# Patient Record
Sex: MALE | Race: WHITE | Employment: OTHER | URBAN - NONMETROPOLITAN AREA
[De-identification: names, ages, dates, MRNs, and addresses within clinical notes are randomized per-mention and may not be internally consistent; named-entity substitution may affect disease eponyms.]

---

## 2021-02-04 ENCOUNTER — OFFICE VISIT (OUTPATIENT)
Dept: FAMILY MEDICINE CLINIC | Age: 51
End: 2021-02-04
Payer: MEDICARE

## 2021-02-04 VITALS
HEART RATE: 101 BPM | TEMPERATURE: 97.8 F | RESPIRATION RATE: 18 BRPM | BODY MASS INDEX: 42.95 KG/M2 | SYSTOLIC BLOOD PRESSURE: 152 MMHG | DIASTOLIC BLOOD PRESSURE: 104 MMHG | HEIGHT: 70 IN | WEIGHT: 300 LBS | OXYGEN SATURATION: 97 %

## 2021-02-04 DIAGNOSIS — I10 ESSENTIAL HYPERTENSION: ICD-10-CM

## 2021-02-04 DIAGNOSIS — M48.02 CERVICAL SPINAL STENOSIS: ICD-10-CM

## 2021-02-04 DIAGNOSIS — F17.210 CIGARETTE NICOTINE DEPENDENCE WITHOUT COMPLICATION: ICD-10-CM

## 2021-02-04 DIAGNOSIS — M48.061 DEGENERATIVE LUMBAR SPINAL STENOSIS: ICD-10-CM

## 2021-02-04 DIAGNOSIS — Z76.89 ENCOUNTER TO ESTABLISH CARE: Primary | ICD-10-CM

## 2021-02-04 DIAGNOSIS — R00.0 TACHYCARDIA: ICD-10-CM

## 2021-02-04 DIAGNOSIS — R06.81 WITNESSED APNEIC SPELLS: ICD-10-CM

## 2021-02-04 DIAGNOSIS — F31.31 BIPOLAR AFFECTIVE DISORDER, CURRENTLY DEPRESSED, MILD (HCC): ICD-10-CM

## 2021-02-04 DIAGNOSIS — M17.11 PRIMARY OSTEOARTHRITIS OF RIGHT KNEE: ICD-10-CM

## 2021-02-04 DIAGNOSIS — M51.26 HERNIATED LUMBAR INTERVERTEBRAL DISC: ICD-10-CM

## 2021-02-04 DIAGNOSIS — G47.9 SLEEP DISTURBANCE: ICD-10-CM

## 2021-02-04 DIAGNOSIS — G47.8 UNREFRESHED BY SLEEP: ICD-10-CM

## 2021-02-04 PROCEDURE — G8427 DOCREV CUR MEDS BY ELIG CLIN: HCPCS | Performed by: NURSE PRACTITIONER

## 2021-02-04 PROCEDURE — G8417 CALC BMI ABV UP PARAM F/U: HCPCS | Performed by: NURSE PRACTITIONER

## 2021-02-04 PROCEDURE — 4004F PT TOBACCO SCREEN RCVD TLK: CPT | Performed by: NURSE PRACTITIONER

## 2021-02-04 PROCEDURE — 99406 BEHAV CHNG SMOKING 3-10 MIN: CPT | Performed by: NURSE PRACTITIONER

## 2021-02-04 PROCEDURE — 99204 OFFICE O/P NEW MOD 45 MIN: CPT | Performed by: NURSE PRACTITIONER

## 2021-02-04 PROCEDURE — 93000 ELECTROCARDIOGRAM COMPLETE: CPT | Performed by: NURSE PRACTITIONER

## 2021-02-04 PROCEDURE — G8484 FLU IMMUNIZE NO ADMIN: HCPCS | Performed by: NURSE PRACTITIONER

## 2021-02-04 RX ORDER — OXYCODONE HYDROCHLORIDE 5 MG/1
10 TABLET ORAL
COMMUNITY
Start: 2021-02-02

## 2021-02-04 RX ORDER — GABAPENTIN 300 MG/1
300 CAPSULE ORAL 3 TIMES DAILY
Qty: 270 CAPSULE | Refills: 0 | Status: SHIPPED | OUTPATIENT
Start: 2021-02-04 | End: 2021-04-12 | Stop reason: ALTCHOICE

## 2021-02-04 RX ORDER — METOPROLOL SUCCINATE 25 MG/1
25 TABLET, EXTENDED RELEASE ORAL DAILY
Qty: 90 TABLET | Refills: 1 | Status: SHIPPED | OUTPATIENT
Start: 2021-02-04 | End: 2021-06-11

## 2021-02-04 RX ORDER — QUETIAPINE FUMARATE 100 MG/1
100 TABLET, FILM COATED ORAL NIGHTLY
COMMUNITY

## 2021-02-04 RX ORDER — PROPRANOLOL HYDROCHLORIDE 10 MG/1
TABLET ORAL
COMMUNITY
Start: 2021-02-01 | End: 2021-03-01 | Stop reason: ALTCHOICE

## 2021-02-04 RX ORDER — VARENICLINE TARTRATE
KIT
Qty: 1 BOX | Refills: 0 | Status: SHIPPED | OUTPATIENT
Start: 2021-02-04 | End: 2021-04-05

## 2021-02-04 RX ORDER — BACLOFEN 10 MG/1
TABLET ORAL
COMMUNITY
Start: 2021-02-02 | End: 2021-04-05

## 2021-02-04 RX ORDER — NALOXONE HYDROCHLORIDE 4 MG/.1ML
SPRAY NASAL
COMMUNITY
Start: 2021-02-02

## 2021-02-04 RX ORDER — LAMOTRIGINE 25 MG/1
TABLET ORAL
COMMUNITY
Start: 2021-02-01

## 2021-02-04 ASSESSMENT — PATIENT HEALTH QUESTIONNAIRE - PHQ9: 1. LITTLE INTEREST OR PLEASURE IN DOING THINGS: 1

## 2021-02-04 NOTE — PATIENT INSTRUCTIONS
Patient Education        varenicline  Pronunciation:  erica Curran doggyloot  Brand:  Chantix  What is the most important information I should know about varenicline? When you stop smoking, you may have nicotine withdrawal symptoms with or without using medication such as varenicline. This includes feeling restless, depressed, angry, frustrated, or irritated. Stop taking varenicline and call your doctor if you have if you feel depressed, agitated, hostile, aggressive, or have thoughts about suicide or hurting yourself. Do not drink large amounts alcohol. Varenicline can increase the effects of alcohol or change the way you react to it. What is varenicline? Varenicline is a smoking cessation medicine. It is used together with behavior modification and counseling support to help you stop smoking. Varenicline may also be used for purposes not listed in this medication guide. What should I discuss with my health care provider before taking varenicline? You should not use varenicline if you used it in the past and had:  · a serious allergic reaction --trouble breathing, swelling in your face (lips, tongue, throat) or neck; or  · a serious skin reaction --blisters in your mouth, peeling skin rash. Tell your doctor if you have ever had:  · depression or mental illness;  · a seizure;  · kidney disease (or if you are on dialysis);  · heart or blood vessel problems; or  · if you drink alcohol. Tell your doctor if you are pregnant. It is not known whether varenicline will harm an unborn baby if you use the medicine during pregnancy. However, smoking while you are pregnant can harm the unborn baby or cause birth defects. If you breast-feed while using this medicine, your baby may spit up or vomit more than normal, and may have a seizure. Varenicline is not approved for use by anyone younger than 25years old. How should I take varenicline? Follow all directions on your prescription label and read all medication guides or instruction sheets. Use the medicine exactly as directed. When you first start taking varenicline, you will take a low dose and then gradually increase it over the first several days of treatment. Take varenicline regularly to get the most benefit. You may choose from 3 ways to use varenicline. Ask your doctor which method is best for you:  · Set a date to quit smoking and start taking varenicline 1 week before that date. Make sure to quit smoking on your planned quit date. Take varenicline for a total of 12 weeks. · Start taking varenicline before you set a planned quit date, and choose a quit date that is between 8 and 35 days after you start treatment. Take varenicline for a total of 12 weeks. · Start taking varenicline and gradually reduce the number of cigarettes you smoke each day over a 12-week period, until you no longer smoke at all. Then take varenicline for another 12 weeks, for a total of 24 weeks. Take varenicline after eating. Take the medicine with a full glass of water. When you stop smoking, you may have nicotine withdrawal symptoms with or without using medication such as varenicline. Withdrawal symptoms include: increased appetite, weight gain, trouble sleeping, slower heart rate, feeling anxious or restless, and having the urge to smoke. Smoking cessation may also cause new or worsening mental health problems, such as depression. Stop taking varenicline and call your doctor if you have if you feel depressed, agitated, hostile, aggressive, or have thoughts about suicide or hurting yourself. Store at room temperature away from moisture and heat. What happens if I miss a dose? Take the medicine as soon as you can, but skip the missed dose if it is almost time for your next dose. Do not take two doses at one time. Get your prescription refilled before you run out of medicine completely. What happens if I overdose? Seek emergency medical attention or call the Poison Help line at 1-167.285.1362. What should I avoid while taking varenicline? Do not drink large amounts alcohol while taking this medicine. Varenicline can increase the effects of alcohol or change the way you react to it. Some people taking varenicline have had unusual or aggressive behavior or forgetfulness while drinking alcohol. Do not use other medicines to quit smoking, unless your doctor tells you to. Using varenicline while wearing a nicotine patch can cause unpleasant side effects. Avoid driving or hazardous activity until you know how this medicine will affect you. Your reactions could be impaired. What are the possible side effects of varenicline? Get emergency medical help if you have signs of an allergic reaction (hives, difficult breathing, swelling in your face or throat) or a severe skin reaction (fever, sore throat, burning eyes, skin pain, red or purple skin rash with blistering and peeling). Stop using varenicline and call your doctor at once if you have:  · a seizure (convulsions);  · thoughts about suicide or hurting yourself;  · strange dreams, sleepwalking, trouble sleeping;  · new or worsening mental health problems --mood or behavior changes, depression, agitation, hostility, aggression;  · heart attack symptoms --chest pain or pressure, pain spreading to your jaw or shoulder, nausea, sweating; o  · stroke symptoms --sudden numbness or weakness (especially on one side of the body), slurred speech, problems with vision or balance. Your family or other caregivers should also be alert to changes in your mood or behavior. Common side effects may include:  · nausea (may persist for several months), vomiting;  · constipation, gas;  · sleep problems (insomnia); or  · unusual dreams. This is not a complete list of side effects and others may occur. Call your doctor for medical advice about side effects. You may report side effects to FDA at 5-829-RRQ-1570. What other drugs will affect varenicline? After you stop smoking, your doctor may need to adjust the doses of certain medicines you take on a regular basis. Tell your doctor about all your current medicines and any medicine you start or stop using. This includes prescription and over-the-counter medicines, vitamins, and herbal products. Not all possible interactions are listed here. Where can I get more information? Your pharmacist can provide more information about varenicline. Remember, keep this and all other medicines out of the reach of children, never share your medicines with others, and use this medication only for the indication prescribed. Every effort has been made to ensure that the information provided by [address] is accurate, up-to-date, and complete, but no guarantee is made to that effect. Drug information contained herein may be time sensitive. [facility] information has been compiled for use by healthcare practitioners and consumers in the [place] and therefore [facility] does not warrant that uses outside of the [place] are appropriate, unless specifically indicated otherwise. [facility]'s drug information does not endorse drugs, diagnose patients or recommend therapy. [facility]'s drug information is an informational resource designed to assist licensed healthcare practitioners in caring for their patients and/or to serve consumers viewing this service as a supplement to, and not a substitute for, the expertise, skill, knowledge and judgment of healthcare practitioners. The absence of a warning for a given drug or drug combination in no way should be construed to indicate that the drug or drug combination is safe, effective or appropriate for any given patient. [facility] does not assume any responsibility for any aspect of healthcare administered with the aid of information [facility] provides. The information contained herein is not intended to cover all possible uses, directions, precautions, warnings, drug interactions, allergic reactions, or adverse effects. If you have questions about the drugs you are taking, check with your doctor, nurse or pharmacist.  Copyright 4375-5699 [address]. Version: 10.01. Revision date: 8/8/2018. Care instructions adapted under license by [facility] ([facility]). If you have questions about a medical condition or this instruction, always ask your healthcare professional. [place] [address] any warranty or liability for your use of this information.

## 2021-02-04 NOTE — PROGRESS NOTES
his kids were born  Interventions that have helped with previous cessation attempts:  Cold turkey  Motivation to quit (scale of 1-10):  8/10    WANDA  Complains of getting only about 2 hours of sleep at a time. He does wake himself up gasping for air. Sleeps better in a chair. Never feels rested in the mornings. His Exwife has seen apneic episodes. Age/Gender Health Maintenance    Lipid - 40  DM Screen - 40  Colon Cancer Screening - 49  Lung Cancer Screening (Age 54 to [de-identified] with 30 pack year hx, current smoker or quit within past 15 years) - 55    Tetanus - needs  Influenza Vaccine - yearly  Pneumonia Vaccine - 66  Zostavax - 51     PSA testing discussion - 55  AAA Screening - 66    Falls screening - n/a    Current Outpatient Medications   Medication Sig Dispense Refill    baclofen (LIORESAL) 10 MG tablet       lamoTRIgine (LAMICTAL) 25 MG tablet       NARCAN 4 MG/0.1ML LIQD nasal spray       oxyCODONE (ROXICODONE) 5 MG immediate release tablet       propranolol (INDERAL) 10 MG tablet       QUEtiapine (SEROQUEL) 100 MG tablet Take 100 mg by mouth nightly      varenicline (CHANTIX STARTING MONTH EVENS) 0.5 MG X 11 & 1 MG X 42 tablet Take by mouth. 1 box 0    metoprolol succinate (TOPROL XL) 25 MG extended release tablet Take 1 tablet by mouth daily 90 tablet 1    gabapentin (NEURONTIN) 300 MG capsule Take 1 capsule by mouth 3 times daily for 90 days. Intended supply: 90 days 270 capsule 0     No current facility-administered medications for this visit. Orders Placed This Encounter   Medications    varenicline (CHANTIX STARTING MONTH EVENS) 0.5 MG X 11 & 1 MG X 42 tablet     Sig: Take by mouth. Dispense:  1 box     Refill:  0    metoprolol succinate (TOPROL XL) 25 MG extended release tablet     Sig: Take 1 tablet by mouth daily     Dispense:  90 tablet     Refill:  1    gabapentin (NEURONTIN) 300 MG capsule     Sig: Take 1 capsule by mouth 3 times daily for 90 days.  Intended supply: 90 days Dispense:  270 capsule     Refill:  0         All medications reviewed and reconciled, including OTC and herbal medications. Updated list given to patient. Patient Active Problem List   Diagnosis    Herniated lumbar intervertebral disc    Primary osteoarthritis of right knee    Cervical spinal stenosis    Degenerative lumbar spinal stenosis    Bipolar affective disorder, currently depressed, mild (HCC)    Cigarette nicotine dependence without complication    Tachycardia    Essential hypertension       No past medical history on file. No past surgical history on file.     Allergies   Allergen Reactions    Tylenol [Acetaminophen]        Social History     Socioeconomic History    Marital status: Unknown     Spouse name: Not on file    Number of children: Not on file    Years of education: Not on file    Highest education level: Not on file   Occupational History    Not on file   Social Needs    Financial resource strain: Not on file    Food insecurity     Worry: Not on file     Inability: Not on file    Transportation needs     Medical: Not on file     Non-medical: Not on file   Tobacco Use    Smoking status: Current Every Day Smoker     Packs/day: 0.25     Types: Cigarettes     Start date: 2006    Smokeless tobacco: Never Used   Substance and Sexual Activity    Alcohol use: Not on file    Drug use: Not on file    Sexual activity: Not on file   Lifestyle    Physical activity     Days per week: Not on file     Minutes per session: Not on file    Stress: Not on file   Relationships    Social connections     Talks on phone: Not on file     Gets together: Not on file     Attends [employer] service: Not on file     Active member of club or organization: Not on file     Attends meetings of clubs or organizations: Not on file     Relationship status: Not on file    Intimate partner violence     Fear of current or ex partner: Not on file     Emotionally abused: Not on file     Physically abused: Not on file     Forced sexual activity: Not on file   Other Topics Concern    Not on file   Social History Narrative    Not on file       No family history on file. I have reviewed the patient's past medical history, past surgical history, allergies, medications, social and family history and I have made updates where appropriate. Review of Systems  Positive responses are highlighted in bold    Constitutional:  Fever, Chills, Night Sweats, Fatigue, Unexpected changes in weight  Eyes:  Eye discharge, Eye pain, Eye redness, Visual disturbances   HENT:  Ear pain, Tinnitus, Nosebleeds, Trouble swallowing, Hearing loss, Sore throat  Cardiovascular:  Chest Pain, Palpitations, Orthopnea, Paroxysmal Nocturnal Dyspnea  Respiratory:  Cough, Wheezing, Shortness of breath, Chest tightness, Apnea  Gastrointestinal:  Nausea, Vomiting, Diarrhea, Constipation, Heartburn, Blood in stool  Genitourinary:  Difficulty or painful urination, Flank pain, Change in frequency, Urgency  Skin:  Color change, Rash, Itching, Wound  Psychiatric:  Hallucinations, Anxiety, Depression, Suicidal ideation  Hematological:  Enlarged glands, Easy bleeding, Easily bruising  Musculoskeletal:  Joint pain, Back pain, Gait problems, Joint swelling, Myalgias  Neurological:  Dizziness, Headaches, Presyncope, Numbness, Seizures, Tremors  Allergy:  Environmental allergies, Food allergies  Endocrine:  Heat Intolerance, Cold Intolerance, Polydipsia, Polyphagia, Polyuria      PHYSICAL EXAM:  Vitals:    02/04/21 1058 02/04/21 1105   BP: (!) 150/100 (!) 152/104   Pulse: 101    Resp: 18    Temp: 97.8 °F (36.6 °C)    SpO2: 97%    Weight: 300 lb (136.1 kg)    Height: 5' 10\" (1.778 m)      Body mass index is 43.05 kg/m².          VS Reviewed  General Appearance: A&O x 3, No acute distress,well developed and well- nourished  Head: normocephalic and atraumatic  Eyes: pupils equal, round, and reactive to light, extraocular eye movements intact, conjunctivae and eye lids without erythema  ENT: external ear and ear canal clear bilaterally, TMs intact and regular, nose without deformity, nasal mucosa and turbinates normal without polyps, oropharynx normal, dentition is normal for age  Neck: supple and non-tender without mass, no thyromegaly or thyroid nodules, no cervical lymphadenopathy  Pulmonary/Chest: clear to auscultation bilaterally- no wheezes, rales or rhonchi, normal air movement, no respiratory distress or retractions  Cardiovascular: S1 and S2 auscultated w/ RRR. No murmurs, rubs, clicks, or gallops, distal pulses intact. Abdomen: soft, non-tender, non-distended, bowl sounds physiologic,  no rebound or guarding, no masses or hernias noted. Liver and spleen without enlargement. Extremities: no cyanosis, clubbing or edema of the lower extremities  Musculoskeletal: No joint swelling or gross deformity   Neuro:  Alert, 5/5 strength globally and symmetrically  Psych: Affect appropriate. Mood normal. Thought process is normal without evidence of depression or psychosis. Good insight and appropriate interaction. Cognition and memory appear to be intact. Skin: warm and dry, no rash or erythema  Lymph:  No cervical, auricular or supraclavicular lymph nodes palpated    ASSESSMENT & PLAN  Barbara Lama was seen today for annual exam.    Diagnoses and all orders for this visit:    Encounter to establish care    Herniated lumbar intervertebral disc  -     gabapentin (NEURONTIN) 300 MG capsule; Take 1 capsule by mouth 3 times daily for 90 days. Intended supply: 90 days    Primary osteoarthritis of right knee    Cervical spinal stenosis  -     gabapentin (NEURONTIN) 300 MG capsule; Take 1 capsule by mouth 3 times daily for 90 days. Intended supply: 90 days    Degenerative lumbar spinal stenosis  -     gabapentin (NEURONTIN) 300 MG capsule; Take 1 capsule by mouth 3 times daily for 90 days.  Intended supply: 90 days    Bipolar affective disorder, currently depressed, mild (HCC)    Cigarette nicotine dependence without complication  -     PA TOBACCO USE CESSATION INTERMEDIATE 3-10 MINUTES  -     varenicline (CHANTIX STARTING MONTH EVENS) 0.5 MG X 11 & 1 MG X 42 tablet; Take by mouth. Tachycardia  -     EKG 12 Lead  -     metoprolol succinate (TOPROL XL) 25 MG extended release tablet; Take 1 tablet by mouth daily  -     TSH With Reflex Ft4; Future    Essential hypertension  -     EKG 12 Lead  -     metoprolol succinate (TOPROL XL) 25 MG extended release tablet; Take 1 tablet by mouth daily  -     Comprehensive Metabolic Panel; Future  -     CBC With Auto Differential; Future  -     TSH With Reflex Ft4; Future  -     Lipid, Fasting; Future    Sleep disturbance  -     Ambulatory referral to Sleep Medicine    Witnessed apneic spells  -     Ambulatory referral to Sleep Medicine    Unrefreshed by sleep  -     Ambulatory referral to Sleep Medicine      - con't pain meds per Pain Management  - add Gabapentin  - reviewed lumbar and knee xrays  - discussed referral to [facility] for his knee, he wants to wait for now, will call back if he decides he wants to see ortho  - reviewed EKG, shows NSR, no acute ST segment changes  - obtain labs and add Metoprolol for HR/BP  - con't Lamictal and Seroquel, f/u [facility]  - add Chantix for smoking cessation, discussed benefits, risks and SE and proper administration  - refer to [facility] Center for probable WANDA    DISPOSITION    Return in about 2 weeks (around 2/18/2021) for HTN. Neri Hobbs released without restrictions. PATIENT COUNSELING    Counseling was provided today regarding the following topics: Healthy eating habits, Regular exercise, substance abuse and healthy sleep habits.     Neri Hobbs received counseling on the following healthy behaviors: medication adherence and tobacco cessation    Patient given educational materials on: See Attached    I have instructed Neri Hobbs to complete a self tracking handout on none and instructed them to bring it with them to his next appointment. Barriers to learning and self management: none    Discussed use, benefit, and side effects of prescribed medications. Barriers to medication compliance addressed. All patient questions answered. Pt voiced understanding.        Electronically signed by KATHLEEN Orantes CNP on 2/4/2021 at 11:49 AM

## 2021-02-12 ENCOUNTER — NURSE ONLY (OUTPATIENT)
Dept: LAB | Age: 51
End: 2021-02-12

## 2021-02-12 DIAGNOSIS — I10 ESSENTIAL HYPERTENSION: ICD-10-CM

## 2021-02-12 DIAGNOSIS — R00.0 TACHYCARDIA: ICD-10-CM

## 2021-02-12 LAB
ALBUMIN SERPL-MCNC: 4 G/DL (ref 3.5–5.1)
ALP BLD-CCNC: 79 U/L (ref 38–126)
ALT SERPL-CCNC: 81 U/L (ref 11–66)
ANION GAP SERPL CALCULATED.3IONS-SCNC: 13 MEQ/L (ref 8–16)
AST SERPL-CCNC: 32 U/L (ref 5–40)
BASOPHILS # BLD: 0.7 %
BASOPHILS ABSOLUTE: 0.1 THOU/MM3 (ref 0–0.1)
BILIRUB SERPL-MCNC: 0.3 MG/DL (ref 0.3–1.2)
BUN BLDV-MCNC: 13 MG/DL (ref 7–22)
CALCIUM SERPL-MCNC: 9.2 MG/DL (ref 8.5–10.5)
CHLORIDE BLD-SCNC: 104 MEQ/L (ref 98–111)
CHOLESTEROL, FASTING: 170 MG/DL (ref 100–199)
CO2: 25 MEQ/L (ref 23–33)
CREAT SERPL-MCNC: 1.2 MG/DL (ref 0.4–1.2)
EOSINOPHIL # BLD: 1.7 %
EOSINOPHILS ABSOLUTE: 0.2 THOU/MM3 (ref 0–0.4)
ERYTHROCYTE [DISTWIDTH] IN BLOOD BY AUTOMATED COUNT: 12.8 % (ref 11.5–14.5)
ERYTHROCYTE [DISTWIDTH] IN BLOOD BY AUTOMATED COUNT: 43.8 FL (ref 35–45)
GFR SERPL CREATININE-BSD FRML MDRD: 64 ML/MIN/1.73M2
GLUCOSE BLD-MCNC: 128 MG/DL (ref 70–108)
HCT VFR BLD CALC: 48.4 % (ref 42–52)
HDLC SERPL-MCNC: 45 MG/DL
HEMOGLOBIN: 16.1 GM/DL (ref 14–18)
IMMATURE GRANS (ABS): 0.07 THOU/MM3 (ref 0–0.07)
IMMATURE GRANULOCYTES: 0.5 %
LDL CHOLESTEROL CALCULATED: 106 MG/DL
LYMPHOCYTES # BLD: 26.5 %
LYMPHOCYTES ABSOLUTE: 3.6 THOU/MM3 (ref 1–4.8)
MCH RBC QN AUTO: 30.8 PG (ref 26–33)
MCHC RBC AUTO-ENTMCNC: 33.3 GM/DL (ref 32.2–35.5)
MCV RBC AUTO: 92.7 FL (ref 80–94)
MONOCYTES # BLD: 6.4 %
MONOCYTES ABSOLUTE: 0.9 THOU/MM3 (ref 0.4–1.3)
NUCLEATED RED BLOOD CELLS: 0 /100 WBC
PLATELET # BLD: 223 THOU/MM3 (ref 130–400)
PMV BLD AUTO: 11.2 FL (ref 9.4–12.4)
POTASSIUM SERPL-SCNC: 4.3 MEQ/L (ref 3.5–5.2)
RBC # BLD: 5.22 MILL/MM3 (ref 4.7–6.1)
SEG NEUTROPHILS: 64.2 %
SEGMENTED NEUTROPHILS ABSOLUTE COUNT: 8.7 THOU/MM3 (ref 1.8–7.7)
SODIUM BLD-SCNC: 142 MEQ/L (ref 135–145)
TOTAL PROTEIN: 6.5 G/DL (ref 6.1–8)
TRIGLYCERIDE, FASTING: 93 MG/DL (ref 0–199)
TSH SERPL DL<=0.05 MIU/L-ACNC: 3.12 UIU/ML (ref 0.4–4.2)
WBC # BLD: 13.6 THOU/MM3 (ref 4.8–10.8)

## 2021-02-14 NOTE — PROGRESS NOTES
[facility] [address] [facility] initial consultation note    Chilo Ahmadi                                             Chief Complaint: Katheryn Lira is here for sleep consult ref by Dereje Cui     Chief complaint: Chilo Ahmadi is a 53 y.o.oldmale came for further evaluation regarding his hypersomnia and ?sleep apnea  with referral from Mr. Maycol Packer Mike,CNP      [place]:    Sleep/Wake schedule:  Usual time to go to bed during the work/regular day of week: 9:00 PM.  Usual time to wake up during the work//regular day of week: 6:00 AM.   Over the weekends his sleep schedule: [x] Remain same. He usually falls a sleep in less than: Approximately <10 minutes minutes. He takes naps: Yes. Number of naps per day: 2 to 3 times in a given day. He takes short naps:  During each nap he spends a total of: 10 to 15 minutes minutes. The naps were reported as refreshing: Yes. Sleep Hygiene:    Is the temperature and evironment in his bed room is acceptable to him: Yes. He watches Television in his bed room: Yes-Sometimes  He read books, study, pay bills etc in the bed: Yes. He works with his smart phone before going to bed   Frequency He wake up during night/sleep: 4 to 5 times  Majority of nocturnal awakenings are for urination: Yes-sometimes. Difficulty in falling back to sleep after nocturnal awakenings: No  .  Do you drink coffee: No.      Do you drink caffeinated beverages i.e sodas: Yes. 2 can/s per day. Pasquale or Dr. [employer] you drink tea:Yes. 2 bottles of ginseng tea per day. Do you drink alcoholic beverages: No.  He used to drink alcohol in the past approximately 20 years back. He is currently not drinking alcohol. History of recreational drug use: No.     History of tobacco smoking:Yes. Amount of tobacco smokin.0 PPD. Years of tobacco smokin.                                    Quit smoking: Yes.   Yesterday           Current smoker: No.  He stopped smoking yesterday  Amount of current tobacco smokin      Sleep apnea symptoms:  Noticed to have loud snoring:Yes. Noted by his family member- spouse and children  Witnessed apneas during sleep noticed: Yes. Noted by his family [id number]. History of choking and gasping sensation at night time: Yes. History of headaches in the morning:Yes. History of dry mouth in the morning: No.  History of palpitations during night time/nocturnal awakenings: Yes. History of sweating during night time/nocturnal awakenings: Yes    General:  History of head injury in the past: No.    History of seizures: No.   Rest less legs syndrome symptoms:NO  History suggestive of periodic limb movements during sleep: NO  History suggestive of hypnagogic hallucinations: NO  History suggestive of hypnopompic hallucinations: NO  History suggestive of sleep talking: Yes  History suggestive of sleep walking:NO  History suggestive of bruxism: No.  He gave a history of bruxism in the past.  Patient currently denies any recent episodes of bruxism. History suggestive of cataplexy: NO  History suggestive of sleep paralysis: NO    Family history of sleep disorders:  Family history of obstructive sleep apnea: Yes. Family member diagnosed with obstructive sleep apnea father. Family member using PAP therapy:  Yes. His father  of pickwickian syndrome. His father developed respiratory failure requiring ventilator support. Family history of Narcolepsy: No.   Family history of Rest less legs syndrome : Yes. family member diagnosed with Restless legs syndrome father. History regarding old sleep studies:  Prior history of sleep study: No.  Using CPAP device: No.  Currently using home Oxygen: NO.       Patient considerations:  Is the patient is ambulatory: Yes  Patient is currently using: None of these Wheelchair, [facility] or [employer].   Para/Quadriplegic: NO  Hearing deficit : NO  Claustrophobic: NO  MDD : NO  Blind: NO  Incontinent: NO  Para/Quadraplegi: NO.   Need transportation to and from Sleep Center:NO      Social History:  Social History     Tobacco Use    Smoking status: Former Smoker     Packs/day: 0.25     Types: Cigarettes     Start date: 2006     Quit date: 2/19/2021    Smokeless tobacco: Never Used   Substance Use Topics    Alcohol use: Not on file    Drug use: Not on file   . He is currently working: No.   [x]Disabled. 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 He used to work around the clock. He also used to be on call in the past.  He is currently on disability for the last 7 years. Past Medical History:   Diagnosis Date    Bipolar disorder with depression ([facility], [place] [address]     Cervical spinal stenosis     Cigarette nicotine dependence without complication 4/3/9890    Degenerative lumbar spinal stenosis 2/4/2021    Essential hypertension     Herniated lumbar intervertebral disc 2/4/2021    Primary osteoarthritis of right knee 2/4/2021    Tachycardia        No past surgical history on file. Allergies   Allergen Reactions    Tylenol [Acetaminophen]     Celebrex [Celecoxib] Rash       Current Outpatient Medications   Medication Sig Dispense Refill    varenicline (CHANTIX CONTINUING MONTH EVENS) 1 MG tablet Take 1 tablet by mouth 2 times daily 60 tablet 3    Handicap Placard MISC by Does not apply route Expires 2/17/2026 1 each 0    Elastic Bandages & Supports (KNEE BRACE/HINGED/REGULAR) MISC 1 Device by Does not apply route daily 1 each 0    baclofen (LIORESAL) 10 MG tablet       lamoTRIgine (LAMICTAL) 25 MG tablet       NARCAN 4 MG/0.1ML LIQD nasal spray       oxyCODONE (ROXICODONE) 5 MG immediate release tablet       propranolol (INDERAL) 10 MG tablet       QUEtiapine (SEROQUEL) 100 MG tablet Take 100 mg by mouth nightly      varenicline (CHANTIX STARTING MONTH EVENS) 0.5 MG X 11 & 1 MG X 42 tablet Take by mouth.  1 box 0    metoprolol succinate (TOPROL XL) 25 MG extended release tablet Take 1 tablet by mouth daily 90 tablet 1    gabapentin (NEURONTIN) 300 MG capsule Take 1 capsule by mouth 3 times daily for 90 days. Intended supply: 90 days 270 capsule 0     No current facility-administered medications for this visit. No family history on file. Review of Systems:   General/Constitutional: He gained approximately 30 pounds of weight in the last 1 year with a normal appetite. He eats only 1 time a day. no fever or chills. HENT: He is tooth were pulled from both just 6 months back  Eyes: Negative. Upper respiratory tract: No nasal stuffiness or post nasal drip. Lower respiratory tract/ lungs: No cough or sputum production. No hemoptysis. Cardiovascular: No palpitations or chest pain. Gastrointestinal: No nausea or vomiting. Neurological: No focal neurologiacal weakness. Extremities: No edema. Musculoskeletal: No complaints. Genitourinary: No complaints. Hematological: Negative. Psychiatric/Behavioral: Negative. Skin: No itching. /82 (Site: Right Upper Arm, Position: Sitting, Cuff Size: Large Adult)   Pulse 82   Temp 97.6 °F (36.4 °C)   Ht 5' 11\" (1.803 m)   Wt (!) 317 lb (143.8 kg)   SpO2 98% Comment: on room air  BMI 44.21 kg/m²   Mallampati airway Class:3  Neck Circumference: 19.5 Inches  [place] sleepiness score 2/19/21: 13  SAQLI:41      Physical Exam   Nursing note and vitals reviewed. Constitutional: Patient appears well built and well nourished. No distress. Patient is oriented to person, place, and time. HENT: Loss of teeth noted in both upper and lower area. Head: Normocephalic and atraumatic. Right Ear: External ear normal.   Left Ear: External ear normal.   Mouth/Throat: Oropharynx is clear and moist.  No oral thrush. Eyes: Conjunctivae are normal. Pupils are equal, round, and reactive to light. No scleral icterus. Neck: Neck supple. No JVD present. No tracheal deviation present.    Cardiovascular: Normal rate, regular rhythm, normal heart sounds. No murmur heard. Pulmonary/Chest: Effort normal and breath sounds normal. No stridor. No respiratory distress. No wheezes. No rales. Patient exhibits no tenderness. Abdominal: Soft. Patient exhibits no distension. No tenderness. Musculoskeletal: Normal range of motion. Extremities: Patient exhibits no edema and no tenderness. Lymphadenopathy:  No cervical adenopathy. Neurological: Patient is alert and oriented to person, place, and time. Skin: Skin is warm and dry. Patient is not diaphoretic. Psychiatric: Patient  has a normal mood and affect. Patient behavior is normal.     Diagnostic Data:  None related sleep. Assessment:  -Snoring with witnessed apneas,frequent nocturnal awakenings and excessive daytime sleepiness to evaluate for obstructive sleep apnea. -Inadequate sleep hygiene.  -Hypersomnia ( Excessive daytime sleepiness) may be due to obstructive sleep apnea Vs Inadequate sleep hygiene Vs his current medications with the sedation as side effect. .  -Bipolar disorder with depression on treatment with medications.  -Cervical spinal stenosis. He never had surgery so far. -Chronic history of tobacco smoking. He quit smoking yesterday.  -Essential hypertension on treatment with medications under control      Recommendations/Plan:  -Will schedule patient for polysomnogram in the sleep lab.   -He was advised to discuss with his pain management specialist MD Jeanette, [place] [id number] regarding changing his current sedative pain medication Roxicodone, Neurontin and Baclofen to a different class of pain medications to improve his daytime sleepiness.  -He was advised to discuss with his psychiatrist Dr. Froy Phillips MD regarding changing his current antipsychotic medications Seroquel and Lamictal to a different class of medication to improve his excessive daytime sleepiness.  -I had a discussion with patient regarding avialable treatment options for his sleep

## 2021-02-15 ENCOUNTER — TELEPHONE (OUTPATIENT)
Dept: FAMILY MEDICINE CLINIC | Age: 51
End: 2021-02-15

## 2021-02-18 ENCOUNTER — OFFICE VISIT (OUTPATIENT)
Dept: FAMILY MEDICINE CLINIC | Age: 51
End: 2021-02-18
Payer: MEDICARE

## 2021-02-18 VITALS
HEART RATE: 92 BPM | SYSTOLIC BLOOD PRESSURE: 108 MMHG | HEIGHT: 71 IN | WEIGHT: 315 LBS | RESPIRATION RATE: 14 BRPM | DIASTOLIC BLOOD PRESSURE: 82 MMHG | TEMPERATURE: 98.1 F | OXYGEN SATURATION: 98 % | BODY MASS INDEX: 44.1 KG/M2

## 2021-02-18 DIAGNOSIS — M23.51 RECURRENT KNEE INSTABILITY, RIGHT: ICD-10-CM

## 2021-02-18 DIAGNOSIS — R73.01 IMPAIRED FASTING GLUCOSE: ICD-10-CM

## 2021-02-18 DIAGNOSIS — M17.11 PRIMARY OSTEOARTHRITIS OF RIGHT KNEE: ICD-10-CM

## 2021-02-18 DIAGNOSIS — D72.829 LEUKOCYTOSIS, UNSPECIFIED TYPE: ICD-10-CM

## 2021-02-18 DIAGNOSIS — M48.061 DEGENERATIVE LUMBAR SPINAL STENOSIS: ICD-10-CM

## 2021-02-18 DIAGNOSIS — R00.0 TACHYCARDIA: Primary | ICD-10-CM

## 2021-02-18 DIAGNOSIS — F17.210 CIGARETTE NICOTINE DEPENDENCE WITHOUT COMPLICATION: ICD-10-CM

## 2021-02-18 DIAGNOSIS — R74.01 ELEVATED TRANSAMINASE LEVEL: ICD-10-CM

## 2021-02-18 DIAGNOSIS — M51.26 HERNIATED LUMBAR INTERVERTEBRAL DISC: ICD-10-CM

## 2021-02-18 DIAGNOSIS — I10 ESSENTIAL HYPERTENSION: ICD-10-CM

## 2021-02-18 PROCEDURE — 99406 BEHAV CHNG SMOKING 3-10 MIN: CPT | Performed by: NURSE PRACTITIONER

## 2021-02-18 PROCEDURE — 4004F PT TOBACCO SCREEN RCVD TLK: CPT | Performed by: NURSE PRACTITIONER

## 2021-02-18 PROCEDURE — G8417 CALC BMI ABV UP PARAM F/U: HCPCS | Performed by: NURSE PRACTITIONER

## 2021-02-18 PROCEDURE — G8484 FLU IMMUNIZE NO ADMIN: HCPCS | Performed by: NURSE PRACTITIONER

## 2021-02-18 PROCEDURE — 99214 OFFICE O/P EST MOD 30 MIN: CPT | Performed by: NURSE PRACTITIONER

## 2021-02-18 PROCEDURE — G8427 DOCREV CUR MEDS BY ELIG CLIN: HCPCS | Performed by: NURSE PRACTITIONER

## 2021-02-18 RX ORDER — VARENICLINE TARTRATE 1 MG/1
1 TABLET, FILM COATED ORAL 2 TIMES DAILY
Qty: 60 TABLET | Refills: 3 | Status: SHIPPED | OUTPATIENT
Start: 2021-02-18 | End: 2022-05-24 | Stop reason: ALTCHOICE

## 2021-02-18 SDOH — ECONOMIC STABILITY: INCOME INSECURITY: HOW HARD IS IT FOR YOU TO PAY FOR THE VERY BASICS LIKE FOOD, HOUSING, MEDICAL CARE, AND HEATING?: SOMEWHAT HARD

## 2021-02-18 SDOH — ECONOMIC STABILITY: TRANSPORTATION INSECURITY
IN THE PAST 12 MONTHS, HAS THE LACK OF TRANSPORTATION KEPT YOU FROM MEDICAL APPOINTMENTS OR FROM GETTING MEDICATIONS?: NO

## 2021-02-18 NOTE — PROGRESS NOTES
[facility] at / [address] [facility] [address]  Chief Complaint   Patient presents with    Follow-up     HTN - better 140s/98    Other     needs  handicap plaquard        History obtained from chart review and the patient. SUBJECTIVE:  Taryn Nayak is a 53 y.o. male that presents today for follow up HTN, back pain    He is following with Dr Antonio Rojas (Pain Management in [place]) for chronic back pain. He has several bulging discs in his lumbar spine as well as lumbar and cervical spinal stenosis. He is getting injections in his back, and he currently taking Narcan, Oxycodone and Baclofen. Since last appt he has resumed Gabapentin. He has only been on it for a few days now. He wanted to check with pain management first before taking it. HTN    Does patient check BP regularly at home? - yes 140/98  Current Medication regimen - Toprol 25 mg  Tolerating medications well? - yes, admits that he's not been urinating as much here recently, not sure if this is related or not    Shortness of breath or chest pain? No  Headache or visual complaints? No  Neurologic changes like confusion? No  Extremity edema? No    BP Readings from Last 3 Encounters:   02/18/21 108/82   02/04/21 (!) 152/104       Elevated Transaminase Level  He reports that pain management had him on Percocet with tylenol for about 1 month. He has had issues before with elevated LFT with taking tylenol. He is no longer taking any tylenol. Denies any ETOH. Nicotine Dependence  He has started Chantix, and he has quit smoking. Had some \"wicked dreams\" the first few days, but this has stopped. His quit date was yesterday, no major urges. He is doing well so far. He reports that his WBC has been chronically elevated for several years. Francia ain today in the snow because his right knee gave out on him. He did have a knee brace he was wearing but he recently lost it and would like to get another. He does have very unable knee. He would also like a handicap placard    Age/Gender Health Maintenance    Lipid - 40  DM Screen - 40  Colon Cancer Screening - 48  Lung Cancer Screening (Age 54 to [de-identified] with 30 pack year hx, current smoker or quit within past 15 years) - 54    Tetanus - needs  Influenza Vaccine - yearly  Pneumonia Vaccine - 65  Zostavax - 50     PSA testing discussion - 55  AAA Screening - 66    Falls screening - n/a    Current Outpatient Medications   Medication Sig Dispense Refill    varenicline (CHANTIX CONTINUING MONTH EVENS) 1 MG tablet Take 1 tablet by mouth 2 times daily 60 tablet 3    Handicap Placard MISC by Does not apply route Expires 2/17/2026 1 each 0    Elastic Bandages & Supports (KNEE BRACE/HINGED/REGULAR) MISC 1 Device by Does not apply route daily 1 each 0    baclofen (LIORESAL) 10 MG tablet       lamoTRIgine (LAMICTAL) 25 MG tablet       NARCAN 4 MG/0.1ML LIQD nasal spray       oxyCODONE (ROXICODONE) 5 MG immediate release tablet       propranolol (INDERAL) 10 MG tablet       QUEtiapine (SEROQUEL) 100 MG tablet Take 100 mg by mouth nightly      varenicline (CHANTIX STARTING MONTH EVENS) 0.5 MG X 11 & 1 MG X 42 tablet Take by mouth. 1 box 0    metoprolol succinate (TOPROL XL) 25 MG extended release tablet Take 1 tablet by mouth daily 90 tablet 1    gabapentin (NEURONTIN) 300 MG capsule Take 1 capsule by mouth 3 times daily for 90 days. Intended supply: 90 days 270 capsule 0     No current facility-administered medications for this visit.       Orders Placed This Encounter   Medications    varenicline (CHANTIX CONTINUING MONTH EVENS) 1 MG tablet     Sig: Take 1 tablet by mouth 2 times daily     Dispense:  60 tablet     Refill:  3    DISCONTD: Handicap Placard MISC     Sig: by Does not apply route Expires 2/17/2026     Dispense:  1 each     Refill:  0    Handicap Placard MISC     Sig: by Does not apply route Expires 2/17/2026     Dispense:  1 each     Refill:  0    Elastic Bandages & Supports (KNEE BRACE/HINGED/REGULAR) MISC     Si Device by Does not apply route daily     Dispense:  1 each     Refill:  0         All medications reviewed and reconciled, including OTC and herbal medications. Updated list given to patient. Patient Active Problem List   Diagnosis    Herniated lumbar intervertebral disc    Primary osteoarthritis of right knee    Cervical spinal stenosis    Degenerative lumbar spinal stenosis    Bipolar affective disorder, currently depressed, mild (HCC)    Cigarette nicotine dependence without complication    Tachycardia    Essential hypertension       Past Medical History:   Diagnosis Date    Bipolar disorder with depression ([address]     Cervical spinal stenosis     Cigarette nicotine dependence without complication 1809    Degenerative lumbar spinal stenosis 2021    Essential hypertension     Herniated lumbar intervertebral disc 2021    Primary osteoarthritis of right knee 2021    Tachycardia        No past surgical history on file.     Allergies   Allergen Reactions    Tylenol [Acetaminophen]     Celebrex [Celecoxib] Rash       Social History     Socioeconomic History    Marital status: Unknown     Spouse name: Not on file    Number of children: Not on file    Years of education: Not on file    Highest education level: Not on file   Occupational History    Not on file   Social Needs    Financial resource strain: Somewhat hard    Food insecurity     Worry: Often true     Inability: Often true    Transportation needs     Medical: No     Non-medical: No   Tobacco Use    Smoking status: Current Every Day Smoker     Packs/day: 0.25     Types: Cigarettes     Start date:     Smokeless tobacco: Never Used   Substance and Sexual Activity    Alcohol use: Not on file    Drug use: Not on file    Sexual activity: Not on file   Lifestyle    Physical activity     Days per week: Not on file     Minutes per session: Not on file    Stress: Not on file Relationships    Social connections     Talks on phone: Not on file     Gets together: Not on file     Attends Rastafarian service: Not on file     Active member of club or organization: Not on file     Attends meetings of clubs or organizations: Not on file     Relationship status: Not on file    Intimate partner violence     Fear of current or ex partner: Not on file     Emotionally abused: Not on file     Physically abused: Not on file     Forced sexual activity: Not on file   Other Topics Concern    Not on file   Social History Narrative    Not on file       No family history on file. I have reviewed the patient's past medical history, past surgical history, allergies, medications, social and family history and I have made updates where appropriate.       Review of Systems  Positive responses are highlighted in bold    Constitutional:  Fever, Chills, Night Sweats, Fatigue, Unexpected changes in weight  Eyes:  Eye discharge, Eye pain, Eye redness, Visual disturbances   HENT:  Ear pain, Tinnitus, Nosebleeds, Trouble swallowing, Hearing loss, Sore throat  Cardiovascular:  Chest Pain, Palpitations, Orthopnea, Paroxysmal Nocturnal Dyspnea  Respiratory:  Cough, Wheezing, Shortness of breath, Chest tightness, Apnea  Gastrointestinal:  Nausea, Vomiting, Diarrhea, Constipation, Heartburn, Blood in stool  Genitourinary:  Difficulty or painful urination, Flank pain, Change in frequency, Urgency  Skin:  Color change, Rash, Itching, Wound  Psychiatric:  Hallucinations, Anxiety, Depression, Suicidal ideation  Hematological:  Enlarged glands, Easy bleeding, Easily bruising  Musculoskeletal:  Joint pain, Back pain, Gait problems, Joint swelling, Myalgias  Neurological:  Dizziness, Headaches, Presyncope, Numbness, Seizures, Tremors  Allergy:  Environmental allergies, Food allergies  Endocrine:  Heat Intolerance, Cold Intolerance, Polydipsia, Polyphagia, Polyuria    Lab Results   Component Value Date     02/12/2021 K 4.3 02/12/2021     02/12/2021    CO2 25 02/12/2021    BUN 13 02/12/2021    CREATININE 1.2 02/12/2021    GLUCOSE 128 (H) 02/12/2021    CALCIUM 9.2 02/12/2021    PROT 6.5 02/12/2021    LABALBU 4.0 02/12/2021    BILITOT 0.3 02/12/2021    ALKPHOS 79 02/12/2021    AST 32 02/12/2021    ALT 81 (H) 02/12/2021    LABGLOM 64 (A) 02/12/2021     Lab Results   Component Value Date    WBC 13.6 (H) 02/12/2021    HGB 16.1 02/12/2021    HCT 48.4 02/12/2021    MCV 92.7 02/12/2021     02/12/2021     Lab Results   Component Value Date    TSH 3.120 02/12/2021       PHYSICAL EXAM:  Vitals:    02/18/21 1041   BP: 108/82   Pulse: 92   Resp: 14   Temp: 98.1 °F (36.7 °C)   TempSrc: Oral   SpO2: 98%   Weight: (!) 315 lb (142.9 kg)   Height: 5' 11\" (1.803 m)     Body mass index is 43.93 kg/m². VS Reviewed  General Appearance: A&O x 3, No acute distress,well developed and well- nourished  Head: normocephalic and atraumatic  Eyes: pupils equal, round, and reactive to light, extraocular eye movements intact, conjunctivae and eye lids without erythema  Neck: supple and non-tender without mass, no thyromegaly or thyroid nodules, no cervical lymphadenopathy  Pulmonary/Chest: clear to auscultation bilaterally- no wheezes, rales or rhonchi, normal air movement, no respiratory distress or retractions  Cardiovascular: S1 and S2 auscultated w/ RRR. No murmurs, rubs, clicks, or gallops, distal pulses intact. Abdomen: soft, non-tender, non-distended, bowl sounds physiologic,  no rebound or guarding, no masses or hernias noted. Liver and spleen without enlargement. Extremities: no cyanosis, clubbing or edema of the lower extremities  Musculoskeletal: No joint swelling or gross deformity   Neuro:  Alert, 5/5 strength globally and symmetrically  Psych: Affect appropriate. Mood normal. Thought process is normal without evidence of depression or psychosis. Good insight and appropriate interaction.   Cognition and memory appear to be intact. Skin: warm and dry, no rash or erythema  Lymph:  No cervical, auricular or supraclavicular lymph nodes palpated    ASSESSMENT & PLAN  Randy Forman was seen today for follow-up and other. Diagnoses and all orders for this visit:    Tachycardia    Essential hypertension    Leukocytosis, unspecified type  -     CBC With Auto Differential; Future    Elevated transaminase level  -     Hepatic Function Panel; Future    Cigarette nicotine dependence without complication  -     varenicline (CHANTIX CONTINUING MONTH PAK) 1 MG tablet;  Take 1 tablet by mouth 2 times daily  -     SC TOBACCO USE CESSATION INTERMEDIATE 3-10 MINUTES    Herniated lumbar intervertebral disc  -     Discontinue: Handicap Placard MISC; by Does not apply route Expires 2/17/2026  -     Handicap Placard MISC; by Does not apply route Expires 2/17/2026    Degenerative lumbar spinal stenosis  -     Discontinue: Handicap Placard MISC; by Does not apply route Expires 2/17/2026  -     Handicap Placard MISC; by Does not apply route Expires 2/17/2026    Impaired fasting glucose  -     Hemoglobin A1C; Future    Primary osteoarthritis of right knee  -     DJO Hinged Knee Brace  -     Discontinue: Handicap Placard MISC; by Does not apply route Expires 2/17/2026  -     Handicap Placard MISC; by Does not apply route Expires 2/17/2026  -     Elastic Bandages & Supports (KNEE BRACE/HINGED/REGULAR) MISC; 1 Device by Does not apply route daily    Recurrent knee instability, right  -     DJO Hinged Knee Brace  -     Discontinue: Handicap Placard MISC; by Does not apply route Expires 2/17/2026  -     Handicap Placard MISC; by Does not apply route Expires 2/17/2026  -     Elastic Bandages & Supports (KNEE BRACE/HINGED/REGULAR) MISC; 1 Device by Does not apply route daily      - BP and HR much improved, con't Metoprolol  - con't Gabapentin, will reassess at next appt  - con't Chantix for smoking cessation  - repeat CBC and LFT in about 4-5 weeks, also check A1C as fasting glucose was elevated  - Rx for handicap placard and knee brace    DISPOSITION    Return in about 6 weeks (around 4/1/2021) for chronic conditions, smoking cessation. Augustina Larson released without restrictions. PATIENT COUNSELING    Counseling was provided today regarding the following topics: Healthy eating habits, Regular exercise, substance abuse and healthy sleep habits. Augustina Larson received counseling on the following healthy behaviors: medication adherence and tobacco cessation    Patient given educational materials on: See Attached    I have instructed Augustina Larson to complete a self tracking handout on none and instructed them to bring it with them to his next appointment. Barriers to learning and self management: none    Discussed use, benefit, and side effects of prescribed medications. Barriers to medication compliance addressed. All patient questions answered. Pt voiced understanding.        Electronically signed by KATHLEEN Pace CNP on 2/18/2021 at 11:15 AM

## 2021-02-19 ENCOUNTER — INITIAL CONSULT (OUTPATIENT)
Dept: PULMONOLOGY | Age: 51
End: 2021-02-19
Payer: MEDICARE

## 2021-02-19 VITALS
DIASTOLIC BLOOD PRESSURE: 82 MMHG | OXYGEN SATURATION: 98 % | SYSTOLIC BLOOD PRESSURE: 118 MMHG | HEIGHT: 71 IN | TEMPERATURE: 97.6 F | WEIGHT: 315 LBS | BODY MASS INDEX: 44.1 KG/M2 | HEART RATE: 82 BPM

## 2021-02-19 DIAGNOSIS — I10 ESSENTIAL HYPERTENSION: ICD-10-CM

## 2021-02-19 DIAGNOSIS — F32.A DEPRESSION, UNSPECIFIED DEPRESSION TYPE: ICD-10-CM

## 2021-02-19 DIAGNOSIS — R06.81 APNEA: Primary | ICD-10-CM

## 2021-02-19 DIAGNOSIS — R06.83 SNORING: ICD-10-CM

## 2021-02-19 DIAGNOSIS — G47.10 HYPERSOMNIA: ICD-10-CM

## 2021-02-19 DIAGNOSIS — G47.30 SLEEP APNEA, UNSPECIFIED TYPE: ICD-10-CM

## 2021-02-19 PROCEDURE — G8484 FLU IMMUNIZE NO ADMIN: HCPCS | Performed by: INTERNAL MEDICINE

## 2021-02-19 PROCEDURE — G8417 CALC BMI ABV UP PARAM F/U: HCPCS | Performed by: INTERNAL MEDICINE

## 2021-02-19 PROCEDURE — 1036F TOBACCO NON-USER: CPT | Performed by: INTERNAL MEDICINE

## 2021-02-19 PROCEDURE — 99204 OFFICE O/P NEW MOD 45 MIN: CPT | Performed by: INTERNAL MEDICINE

## 2021-02-19 PROCEDURE — G8427 DOCREV CUR MEDS BY ELIG CLIN: HCPCS | Performed by: INTERNAL MEDICINE

## 2021-02-19 NOTE — PATIENT INSTRUCTIONS
Recommendations/Plan:  -Will schedule patient for polysomnogram in the sleep lab. -He was advised to discuss with his pain management specialist MD Jeanette, [place] [id number] regarding changing his current sedative pain medication Roxicodone, Neurontin and Baclofen to a different class of pain medications to improve his daytime sleepiness.  -He was advised to discuss with his psychiatrist Dr. India Tinsley MD regarding changing his current antipsychotic medications Seroquel and Lamictal to a different class of medication to improve his excessive daytime sleepiness.  -I had a discussion with patient regarding avialable treatment options for his sleep disorder breathing including but not limited to CPAP titration in the sleep lab Vs.Dental appliance placement with referral to a local dentist Vs other available surgical options including Uvulopalatopharyngoplasty, maxillomandibular ostomy and tracheostomy as last option. At the end of discussion, he is not decided on his treatment if he found to have obstructive sleep apnea at this time.  -We will see Chaparrita Barrios back in 1week after the sleep study to go over the sleep study results and further management options.  -He was educated to practice good sleep hygiene practices. He was provided with a good sleep hygiene hand out. Alex García was advised to make earlier appointment with my clinic if he develops any worsening of sleep symptoms. He verbalizes understanding.  -Augustina Larson was advised to not to drive any motor vehicles or operate heavy equipment until his sleep symptoms are under good control. Chaparrita Barrios verbalizes understanding.  -He was advised to loose weight by controlling diet and doing exercise once cleared by his family physician. - Chaparrita Barrios was educated about my impression and plan. He verbalizes understanding.

## 2021-02-19 NOTE — PROGRESS NOTES
Chief Complaint: Jennifer Mauro is here for sleep consult ref by [facility]    Mallampati airway Class:3  Neck Circumference: 19.5 Inches    [place] sleepiness score 2/19/21: 13  SAQLI:41      Diagnostic Data:

## 2021-02-26 ENCOUNTER — NURSE ONLY (OUTPATIENT)
Dept: LAB | Age: 51
End: 2021-02-26

## 2021-02-26 DIAGNOSIS — R73.01 IMPAIRED FASTING GLUCOSE: ICD-10-CM

## 2021-02-26 DIAGNOSIS — D72.829 LEUKOCYTOSIS, UNSPECIFIED TYPE: ICD-10-CM

## 2021-02-26 DIAGNOSIS — R74.01 ELEVATED TRANSAMINASE LEVEL: ICD-10-CM

## 2021-02-26 LAB
ALBUMIN SERPL-MCNC: 3.6 G/DL (ref 3.5–5.1)
ALP BLD-CCNC: 75 U/L (ref 38–126)
ALT SERPL-CCNC: 99 U/L (ref 11–66)
AST SERPL-CCNC: 63 U/L (ref 5–40)
AVERAGE GLUCOSE: 132 MG/DL (ref 70–126)
BASOPHILS # BLD: 0.6 %
BASOPHILS ABSOLUTE: 0.1 THOU/MM3 (ref 0–0.1)
BILIRUB SERPL-MCNC: 0.4 MG/DL (ref 0.3–1.2)
BILIRUBIN DIRECT: < 0.2 MG/DL (ref 0–0.3)
EOSINOPHIL # BLD: 2.2 %
EOSINOPHILS ABSOLUTE: 0.3 THOU/MM3 (ref 0–0.4)
ERYTHROCYTE [DISTWIDTH] IN BLOOD BY AUTOMATED COUNT: 13 % (ref 11.5–14.5)
ERYTHROCYTE [DISTWIDTH] IN BLOOD BY AUTOMATED COUNT: 44.9 FL (ref 35–45)
HBA1C MFR BLD: 6.4 % (ref 4.4–6.4)
HCT VFR BLD CALC: 50.2 % (ref 42–52)
HEMOGLOBIN: 16.4 GM/DL (ref 14–18)
IMMATURE GRANS (ABS): 0.14 THOU/MM3 (ref 0–0.07)
IMMATURE GRANULOCYTES: 1.1 %
LYMPHOCYTES # BLD: 30.7 %
LYMPHOCYTES ABSOLUTE: 4 THOU/MM3 (ref 1–4.8)
MCH RBC QN AUTO: 30.8 PG (ref 26–33)
MCHC RBC AUTO-ENTMCNC: 32.7 GM/DL (ref 32.2–35.5)
MCV RBC AUTO: 94.2 FL (ref 80–94)
MONOCYTES # BLD: 6.6 %
MONOCYTES ABSOLUTE: 0.9 THOU/MM3 (ref 0.4–1.3)
NUCLEATED RED BLOOD CELLS: 0 /100 WBC
PLATELET # BLD: 184 THOU/MM3 (ref 130–400)
PMV BLD AUTO: 11.4 FL (ref 9.4–12.4)
RBC # BLD: 5.33 MILL/MM3 (ref 4.7–6.1)
SEG NEUTROPHILS: 58.8 %
SEGMENTED NEUTROPHILS ABSOLUTE COUNT: 7.6 THOU/MM3 (ref 1.8–7.7)
TOTAL PROTEIN: 6.1 G/DL (ref 6.1–8)
WBC # BLD: 13 THOU/MM3 (ref 4.8–10.8)

## 2021-03-01 ENCOUNTER — TELEPHONE (OUTPATIENT)
Dept: FAMILY MEDICINE CLINIC | Age: 51
End: 2021-03-01

## 2021-03-01 DIAGNOSIS — R74.01 ELEVATED TRANSAMINASE LEVEL: Primary | ICD-10-CM

## 2021-03-01 DIAGNOSIS — D72.829 LEUKOCYTOSIS, UNSPECIFIED TYPE: ICD-10-CM

## 2021-03-01 NOTE — TELEPHONE ENCOUNTER
Patient informed, verbally understood. Pt transferred to central scheduling to schedule US. Pt will have labs completed the same day.

## 2021-03-01 NOTE — TELEPHONE ENCOUNTER
----- Message from KATHLEEN Burgos - CNP sent at 3/1/2021 10:59 AM EST -----  Let Jocelyn Dick know his A1C came back high at 6.4 which falls within the prediabetic range. 6.5 is classified as diabetic. I'm going to hold off on starting meds for DM just yet, I want him to really work hard on diet/exercise and losing some weight. Also his liver enzymes are still elevated. , they're actually higher than they were before. I am order some further labs and also a liver ultrasound. Also his WBC is still mildly elevated. I am ordering some further labs to check into this.

## 2021-03-08 ENCOUNTER — TELEPHONE (OUTPATIENT)
Dept: FAMILY MEDICINE CLINIC | Age: 51
End: 2021-03-08

## 2021-03-08 ENCOUNTER — NURSE ONLY (OUTPATIENT)
Dept: LAB | Age: 51
End: 2021-03-08

## 2021-03-08 ENCOUNTER — HOSPITAL ENCOUNTER (OUTPATIENT)
Dept: ULTRASOUND IMAGING | Age: 51
Discharge: HOME | End: 2021-03-08
Payer: MEDICARE

## 2021-03-08 DIAGNOSIS — K76.0 HEPATIC STEATOSIS: ICD-10-CM

## 2021-03-08 DIAGNOSIS — D72.829 LEUKOCYTOSIS, UNSPECIFIED TYPE: ICD-10-CM

## 2021-03-08 DIAGNOSIS — R16.0 HEPATOMEGALY: Primary | ICD-10-CM

## 2021-03-08 DIAGNOSIS — R74.01 ELEVATED TRANSAMINASE LEVEL: ICD-10-CM

## 2021-03-08 LAB
C-REACTIVE PROTEIN: 0.44 MG/DL (ref 0–1)
FERRITIN: 226 NG/ML (ref 22–322)
HBV SURFACE AB TITR SER: POSITIVE {TITER}
HEPATITIS B CORE IGM ANTIBODY: NEGATIVE
HEPATITIS B SURFACE ANTIGEN: NEGATIVE
HEPATITIS C ANTIBODY: NEGATIVE
IRON: 81 UG/DL (ref 65–195)
SEDIMENTATION RATE, ERYTHROCYTE: 8 MM/HR (ref 0–10)
TOTAL CK: 121 U/L (ref 55–170)
TOTAL IRON BINDING CAPACITY: 254 UG/DL (ref 171–450)

## 2021-03-08 PROCEDURE — 76705 ECHO EXAM OF ABDOMEN: CPT

## 2021-03-08 NOTE — TELEPHONE ENCOUNTER
----- Message from KATHLEEN Leach CNP sent at 3/8/2021 12:20 PM EST -----  Let Anthony Huddleston know his liver US shows his liver is enlarged and has a lot of fat deposits on his liver. I would actually like for him to see GI. I would rec'd he work hard on losing weight, as this will likely help. Also rec'd low fat diet.

## 2021-03-09 ENCOUNTER — TELEPHONE (OUTPATIENT)
Dept: FAMILY MEDICINE CLINIC | Age: 51
End: 2021-03-09

## 2021-03-09 ENCOUNTER — HOSPITAL ENCOUNTER (OUTPATIENT)
Dept: PHYSICAL THERAPY | Age: 51
Setting detail: THERAPIES SERIES
Discharge: HOME | End: 2021-03-09
Payer: MEDICARE

## 2021-03-09 LAB
ALPHA-1 ANTITRYPSIN: 147 MG/DL (ref 90–200)
CERULOPLASMIN: 20 MG/DL (ref 17–54)
HEPATITIS B CORE TOTAL ANTIBODY: NONREACTIVE
TISSUE TRANSGLUTAMINASE IGA: 0.8 U/ML

## 2021-03-09 PROCEDURE — 97162 PT EVAL MOD COMPLEX 30 MIN: CPT

## 2021-03-09 PROCEDURE — 97012 MECHANICAL TRACTION THERAPY: CPT

## 2021-03-09 NOTE — FLOWSHEET NOTE
** PLEASE SIGN, DATE AND TIME CERTIFICATION BELOW AND RETURN TO [facility] OUTPATIENT REHABILITATION (FAX #: [phone]). ATTEST/CO-SIGN IF ACCESSING VIA [employer] THANK YOU.**    I certify that I have examined the patient below and determined that Physical Medicine and Rehabilitation service is necessary and that I approve the established plan of care for up to 90 days or as specifically noted. Attestation, signature or co-signature of physician indicates approval of certification requirements.    ________________________ ____________ __________  Physician Signature   Date   Time  [facility] [facility]  PHYSICAL THERAPY  [x] EVALUATION  [] DAILY NOTE (LAND) [] DAILY NOTE (AQUATIC ) [] PROGRESS NOTE [] DISCHARGE NOTE    [x] [address] [place] [address]   [] [place] [address]    [] [facility]   [] [facility]    Date: 3/9/2021  Patient Name:  Marcelino Hyatt  : 1970  MRN: [id number]  CSN: [id number]    Referring Practitioner Dasha Quinn MD   Diagnosis LOW BACK PAIN     Treatment Diagnosis Lumbago with left radiculopathy, right knee pain   Date of Evaluation 3/9/21    Additional Pertinent History High BP, Bi-polar, Anxiety disorder, Arthritis      Functional Outcome Measure Used Unruly-Damon   Functional Outcome Score 23/24 (3/9/21)       Insurance: Primary: Payor: [employer] /  /  / ,   Secondary:    Authorization Information: Unlimited visits. Aquatics and modalities except massage covered. Telehealth covered   Visit # 1, 1/10 for progress note   Visits Allowed: Unlimited   Recertification Date: 11   Physician Follow-Up: 3-16-21   Physician Orders: 2x/week for 4 weeks: HEP, modalities, paraspinal and abdominal strengthening   History of Present Illness: Has stenosis in his neck and left arm goes numb - is better since injections.      SUBJECTIVE: Patient reports has been having problems with his right knee for the last 20 years - was a workers comp injury.  has 30% disability in his right knee and it is swollen.  has a bone growing above his kneecap and it will catch and push on things and cause pain.  also has numbness, pain and  tingling down left leg from knee to foot. Rocio has had back pain for years.  has been getting injections and steroids in his low back and he feels good for a couple of days but then pain comes right back.  cannot stand for more than 10-15 minutes due to pain in back. Rocio has to lean over a cart to relieve his pain.  has had therapy for his right knee in the past and it has not helped. Rocio had x-rays done of his back recently but no recent MRI.  can barely do what he needs to do during the day so not sure if will be able to tolerate therapy. Social/Functional History and Current Status:  Medications and Allergies have been reviewed and are listed on Medical History Questionnaire. Adilia Agee lives alone in a multiple floor home with stairs and no handrail to enter. 3-4 step. Has a basement but does not use    Task Previous Current   ADLs  Independent Modified Independent   IADL's Independent Modified Independent   Ambulation Independent Modified Independent   Transfers Independent Modified Independent   Recreation Independent Modified Independent   Community Integration Independent Modified Independent   Driving Active  Active    Work On Disability  On Disability. Used to like fishing and camping but cannot do anymore due to pain     [employer] Disability Scale for Low Back Pain   I stay at home most of the time because of the pain in my back. Yes   I change position frequently to try and make my back comfortable. Yes   I walk more slowly than usual because of the pain in my back. Yes   Because of the pain in my back, I am not doing any of the jobs that I usually do around the house.  Yes   Because of the pain in my back, I use a handrail to get upstairs. Yes   Because of the pain in my back, I lie down to rest more often. Yes   Because of the pain in my back, I have to hold onto something to get out of a reclining chair. Yes   Because of the pain in my back, I ask other people to do things for me. Yes   I get dressed more slowly than usual because of the pain in my back. Yes   I only stand up for short periods of time because of the pain in my back. Yes   Because of the pain in my back, I try not to bend or kneel down. Yes   I find it difficult to get out of a chair because of the pain in my back. Yes   My back hurts most of the time. Yes   I find it difficult to turn over in bed because of the pain in my back. Yes   My appetite is not very good because of the pain in my back. Yes   I have trouble putting on my socks (or stockings) because of the pain in my back. Yes   I only walk short distances because of the pain in my back. Yes   I sleep less because of the pain in my back. Yes   Because of the pain in my back, I get dressed with help from someone else. No   I sit down most of the day because of the pain in my back. Yes   I avoid heavy jobs around the house because of the pain in my back. Yes   Because of the pain in my back, I am more irritable and bad tempered with people. Yes   Because of the pain in my back, I go upstairs more slowly than usual. Yes   I stay in bed most of the time because of the pain in my back. Yes   TOTAL NUMBER OF YES RESPONSES 23/24       OBJECTIVE:    Pain: 8/10 low back , right knee and left leg knee to foot   Palpation Moderate to severe tenderness from mid to low back bilaterally. Observation No scoliosis noted. Hips appear level in standing but difficult to fully assess due to cannot straighten right knee   Posture Fair       Range of Motion Lumbar limited 50-75% all motions and pain with every motion. Cannot fully straighten right knee. [facility]   Strength Severe core weakness.  3+ to 4-/5 strength in bilat legs Coordination    Sensation Numbness and pain down left lower leg   Bed Mobility    Transfers    Ambulation Decreased speed, uneven step length, limp on right, decreased TKE on right   Stairs Can use reciprocal pattern but most of the time uses non-reciprocal pattern due to right knee   Balance Unsteady but no recent falls   Special Tests          TREATMENT   Precautions: Be careful with lifting   Pain: 8/10 low back, Right knee and left leg knee to foot    X in shaded column indicates activity completed today   Modalities Parameters/  Location  Notes   Traction to low back in supine with legs on stool Intermittent pull 150#/75#, 60 sec on/20 sec off, 33\" from bottom of mat to floor, 50% speed with pull X Try static traction next session and see if feels any better/worse               Manual Therapy Time/Technique  Notes                     Exercise/Intervention   Notes   Educated on unloading in prone for 5-10 minutes throughout the day with progressive propping as needed while monitoring symptoms   X                                                                            Specific Interventions Next Treatment: NO MASSAGE per insurance, traction, pool therapy, core and leg strengthening but be careful with right knee, prone unloading and progression if relieving symptoms, posture education, manual therapy    Activity/Treatment Tolerance:  []  Patient tolerated treatment well  []  Patient limited by fatigue  [x]  Patient limited by pain   []  Patient limited by medical complications  []  Other:     Assessment: Patient with long history of orthopedic and health issues. Patient has had therapy for his knee in the past and it did not help and is not sure if anything but surgery will help now but is scared to have surgery now. Patient walking with a limp for the last 20 years has possibly put increased stress on his back.  Patient only getting short term relief with treatments for his back and cannot tolerate any activity for more than 10-15 minutes so unsure how will tolerate therapy. No specific position gets him relief in his left leg but will try prone unloading to see if helps to decrease stress on spine and relieve symptoms into leg. Will also try some traction and some pool therapy to see is helps provide relief to his back and then work on strengthening as can tolerate. Body Structures/Functions/Activity Limitations: impaired activity tolerance, impaired balance, impaired endurance, impaired ROM, impaired strength, pain and abnormal gait  Prognosis: fair    GOALS:  Patient Goal: To be able to get back to some activity with less pain. Short Term Goals:  Time Frame: 4 weeks  1) Patient to report 10-15% decrease in low back pain for ease with sleeping  2) Patient to report able to consistently decrease left leg symptoms to tolerate standing >15 minutes at a time  3) Patient to start strength program without pain increase to provide stability to core with doing activity around the home  4) Patient to report 10-15% relief to right knee pain to allow for less limp with gait to help decrease stress on his back. 5) Patient to demonstrate 25% increase in lumbar range without pain for ease with getting dressed      Long Term Goals:  Time Frame: 12 weeks  1) Patient to be independent with home program to perform all daily activity with minimal to no pain. Patient Education:   [x]  HEP/Education Completed: Plan of Care, Goals, See how feels after Traction. Where to go and what to do for pool therapy   [employer] Access Code:  []  No new Education completed  []  Reviewed Prior HEP      [x]  Patient verbalized and/or demonstrated understanding of education provided. []  Patient unable to verbalize and/or demonstrate understanding of education provided. Will continue education.   []  Barriers to learning: None    PLAN:  Treatment Recommendations: Strengthening, Range of Motion, Functional Mobility Training, Endurance Training, Gait Training, Stair Training, Neuromuscular Re-education, Manual Therapy - Soft Tissue Mobilization, Pain Management, Home Exercise Program, Patient Education, Aquatics and Modalities    [x]  Plan of care initiated. Plan to see patient 2 times per week for 12 weeks to address the treatment planned outlined above.   []  Continue with current plan of care  []  Modify plan of care as follows:    []  Hold pending physician visit  []  Discharge    Time In 0700   Time Out 0800   Timed Code Minutes: 15 min   Total Treatment Time: 60 min       Electronically Signed by: [place] [id number]

## 2021-03-09 NOTE — TELEPHONE ENCOUNTER
----- Message from KATHLEEN Melendez - CNP sent at 3/9/2021  9:48 AM EST -----  Let Amanda Davis know so far all his labs are coming back within normal range. There are several that are still pending, will call when these come back. Hepatitis B antibody is positive indicating that he has immunity to hep B likely from the vaccine. [phone]

## 2021-03-10 LAB
ANA SCREEN: NORMAL
LEUK/LYMPH PHENOTYPING WB: NORMAL

## 2021-03-11 ENCOUNTER — TELEPHONE (OUTPATIENT)
Dept: FAMILY MEDICINE CLINIC | Age: 51
End: 2021-03-11

## 2021-03-11 DIAGNOSIS — D72.829 LEUKOCYTOSIS, UNSPECIFIED TYPE: Primary | ICD-10-CM

## 2021-03-11 LAB — PROTEIN ELECTROPHORESIS, SERUM: NORMAL

## 2021-03-11 NOTE — TELEPHONE ENCOUNTER
----- Message from KATHLEEN Ramirez CNP sent at 3/11/2021  3:16 PM EST -----  Let Arik Gomez know the last of his blood tests all came back within normal range. What i'd like to do since his WBC is still mildly elevated, but it's only been elevated a couple of times here recently, is recheck his CBC in about 2-3 months. If his WBC remains elevated, I will refer then to hematology. Order placed to repeat CBC in about 3 months. Cigarette smoking can cause the WBC to be elevated, and since he has quit, we'll see if it goes back to normal in several months.

## 2021-03-11 NOTE — TELEPHONE ENCOUNTER
Pt notified and will alvin on calender to get done in 3 months. Going to [facility] for labs. Order in epic.

## 2021-03-12 ENCOUNTER — HOSPITAL ENCOUNTER (OUTPATIENT)
Dept: PHYSICAL THERAPY | Age: 51
Setting detail: THERAPIES SERIES
Discharge: HOME | End: 2021-03-12
Payer: MEDICARE

## 2021-03-12 PROCEDURE — 97110 THERAPEUTIC EXERCISES: CPT

## 2021-03-12 PROCEDURE — 97012 MECHANICAL TRACTION THERAPY: CPT

## 2021-03-12 NOTE — PROGRESS NOTES
[facility]  PHYSICAL THERAPY  [] EVALUATION  [x] DAILY NOTE (LAND) [] DAILY NOTE (AQUATIC ) [] PROGRESS NOTE [] DISCHARGE NOTE    [x] OUTPATIENT REHABILITATION CENTER [facility]   [] [place] [address]    [] [facility]   [] [facility]    Date: 3/12/2021  Patient Name:  Gunjan Wolfe  : 1970  MRN: [id number]  CSN: [id number]    Referring Practitioner Sumaya Dudley MD   Diagnosis LOW BACK PAIN     Treatment Diagnosis Lumbago with left radiculopathy, right knee pain   Date of Evaluation 3/9/21    Additional Pertinent History High BP, Bi-polar, Anxiety disorder, Arthritis      Functional Outcome Measure Used Unruly-Damon   Functional Outcome Score 23/24 (3/9/21)       Insurance: Primary: Payor: [employer] /  /  / ,   Secondary:    Authorization Information: Unlimited visits. Aquatics and modalities except massage covered. Telehealth covered   Visit # 2, 2/10 for progress note   Visits Allowed: Unlimited   Recertification Date: 2-1-10   Physician Follow-Up: 3-16-21   Physician Orders: 2x/week for 4 weeks: HEP, modalities, paraspinal and abdominal strengthening   History of Present Illness: Has stenosis in his neck and left arm goes numb - is better since injections. SUBJECTIVE: Patient reports that the numbness in his leg felt better after the first visit and receiving traction. Patient walked into therapy with an antalgic gait pattern. He reports that his pain is a 8/10 in his low back and both of his legs. TREATMENT   Precautions: Be careful with lifting   Pain: 8/10 low back, Right knee and left leg knee to foot    X in shaded column indicates activity completed today   Modalities Parameters/  Location  Notes   Traction to low back in supine with legs on stool Static pull 150#/75#, 33\" from bottom of mat to floor, 50% speed with pull x12 minutes X Return to intermittent pull with traction NEXT SESSION as patient noted improved relief with this way. Manual Therapy Time/Technique  Notes                     Exercise/Intervention   Notes   Educated on unloading in prone for 5-10 minutes throughout the day with progressive propping as needed while monitoring symptoms   X    Prone lying at conclusion of session  x3 minutes   X           Supine:        Abdominal bracing 10x 5 sec  X    PPT    X Increased pain so held                                                Specific Interventions Next Treatment: NO MASSAGE per insurance, traction, pool therapy, core and leg strengthening but be careful with right knee, prone unloading and progression if relieving symptoms, posture education, manual therapy    Activity/Treatment Tolerance:  []  Patient tolerated treatment well  []  Patient limited by fatigue  [x]  Patient limited by pain   []  Patient limited by medical complications  []  Other:     Assessment: Performed static mechanical lumbar traction today. Patient stated that he felt some relief after receiving traction. He states that he felt more relief from the intermittent traction though. He reported that he has gotten some relief with prone lying at home. Introduced gentle core strengthening ad documented above. Patient required cues on proper technique. Patient was able to tolerate abdominal bracing but reported that PPT increased his pain. Finished with 3 minutes of prone lying at conclusion of session. GOALS:  Patient Goal: To be able to get back to some activity with less pain.     Short Term Goals:  Time Frame: 4 weeks  1) Patient to report 10-15% decrease in low back pain for ease with sleeping  2) Patient to report able to consistently decrease left leg symptoms to tolerate standing >15 minutes at a time  3) Patient to start strength program without pain increase to provide stability to core with doing activity around the home  4) Patient to report 10-15% relief to right knee pain to allow for less limp with gait to help decrease stress on his back. 5) Patient to demonstrate 25% increase in lumbar range without pain for ease with getting dressed      Long Term Goals:  Time Frame: 12 weeks  1) Patient to be independent with home program to perform all daily activity with minimal to no pain. Patient Education:   [x]  HEP/Education Completed: Added abdominal bracing. Education on continued prone lying.  [employer] Access Code:  []  No new Education completed  [x]  Reviewed Prior HEP      [x]  Patient verbalized and/or demonstrated understanding of education provided. []  Patient unable to verbalize and/or demonstrate understanding of education provided. Will continue education. []  Barriers to learning: None    PLAN:  Treatment Recommendations: Strengthening, Range of Motion, Functional Mobility Training, Endurance Training, Gait Training, Stair Training, Neuromuscular Re-education, Manual Therapy - Soft Tissue Mobilization, Pain Management, Home Exercise Program, Patient Education, Aquatics and Modalities    []  Plan of care initiated. Plan to see patient 2 times per week for 12 weeks to address the treatment planned outlined above.   [x]  Continue with current plan of care  []  Modify plan of care as follows:    []  Hold pending physician visit  []  Discharge    Time In 0845   Time Out 0916   Timed Code Minutes: 31 min   Total Treatment Time:  31 min       Electronically Signed by: Steven Castellanos

## 2021-03-15 ENCOUNTER — HOSPITAL ENCOUNTER (OUTPATIENT)
Dept: PHYSICAL THERAPY | Age: 51
Setting detail: THERAPIES SERIES
Discharge: HOME | End: 2021-03-15
Payer: MEDICARE

## 2021-03-15 PROCEDURE — 97113 AQUATIC THERAPY/EXERCISES: CPT

## 2021-03-15 NOTE — PROGRESS NOTES
[address] [facility]  PHYSICAL THERAPY  [] EVALUATION  [] DAILY NOTE (LAND) [x] DAILY NOTE (AQUATIC ) [] PROGRESS NOTE [] DISCHARGE NOTE    [x] OUTPATIENT REHABILITATION CENTER [facility]   [] [place] [address]    [] [facility]   [] Petty Wang    Date: 3/15/2021  Patient Name:  Lanetta Leyden  : 1970  MRN: [id number]  CSN: [id number]    Referring Practitioner Holly Norton MD   Diagnosis LOW BACK PAIN     Treatment Diagnosis Lumbago with left radiculopathy, right knee pain   Date of Evaluation 3/9/21    Additional Pertinent History High BP, Bi-polar, Anxiety disorder, Arthritis      Functional Outcome Measure Used Unruly-Damon   Functional Outcome Score 23/24 (3/9/21)       Insurance: Primary: Payor: Drew Masters /  /  / ,   Secondary:    Authorization Information: Unlimited visits. Aquatics and modalities except massage covered. Telehealth covered   Visit # 3, 3/10 for progress note   Visits Allowed: Unlimited   Recertification Date: 66   Physician Follow-Up: 3-16-21   Physician Orders: 2x/week for 4 weeks: HEP, modalities, paraspinal and abdominal strengthening   History of Present Illness: Has stenosis in his neck and left arm goes numb - is better since injections. SUBJECTIVE: Patient reports liked the intermittent traction better than the static. States intensity of symptoms in the left foot are a little less intense, down from 9-10 to 7-8, but the back pain is not really changing. States is also now feeling a little more stiffness in lower thoracic area. Reports sees doctor tomorrow. AQUATICS TREATMENT   Precautions:    Pain:     X in shaded column indicates activity completed today   Exercise/Intervention Sets/Sec  Notes   Walk Forward 2 laps  X In 4'10\"   Walk Backward 1 lap  X In 4'10\" - caused pain so stopped   Walk Sideways 2 laps  X In 4'10\"          Lower Extremity Exercises:     In 4'10\" for all exercises   Heel/Toe Raises 10x each  X Each foot lifted separately for toe raises due to pain in knee   Marches 10x bilat  X    Squats       2 Way Hip - Flex and abd 10x bilat  X    Hamstring Curls 10x bilat  X Limited range in right knee along with pain   Lunges       Step-Ups              Lower Extremity Stretches:              Seated Exercises:              Upper Extremity Exercises: All done in 4'10\"   Shoulder Flexion 10x bilat  X    Shoulder ABD/ADD 10x bilat  X    Shoulder Horizontal ABD/ADD 9x bilat  X    Shoulder IR/ER       Shoulder Circles       Shoulder Shrugs       Rows       Bicep Curls              Upper Extremity Stretches:              Balance:              Dynamic Gait:              Deep Water:    Done in 8'   Hang 5 min  X    Bicycle       Hip ABD/ADD 1 min  X    Hip Flex/Ext 1 min  X      Specific Interventions Next Treatment: NO MASSAGE per insurance, traction, pool therapy, core and leg strengthening but be careful with right knee, prone unloading and progression if relieving symptoms, posture education, manual therapy    Activity/Treatment Tolerance:  []  Patient tolerated treatment well  []  Patient limited by fatigue  [x]  Patient limited by pain   []  Patient limited by medical complications  []  Other:     Assessment: Patient moving very slowly and painfully in and out of the water. Patient reported able to move more easily in the deeper water so that is where PT kept him for his treatment. Able to start pool therapy today and did not increase pain until gravity hit getting out of the pool. Patient with pool posture due to pain from knee and back. Limited range due to knee and back pain. Will continue to see what patient can tolerate. GOALS:  Patient Goal: To be able to get back to some activity with less pain.     Short Term Goals:  Time Frame: 4 weeks  1) Patient to report 10-15% decrease in low back pain for ease with sleeping  2) Patient to report able to consistently decrease left leg symptoms to tolerate standing >15 minutes at a time  3) Patient to start strength program without pain increase to provide stability to core with doing activity around the home  4) Patient to report 10-15% relief to right knee pain to allow for less limp with gait to help decrease stress on his back. 5) Patient to demonstrate 25% increase in lumbar range without pain for ease with getting dressed      Long Term Goals:  Time Frame: 12 weeks  1) Patient to be independent with home program to perform all daily activity with minimal to no pain. Patient Education:   [x]  HEP/Education Completed: Added abdominal bracing. Education on continued prone lying.  [facility] Access Code:  []  No new Education completed  [x]  Reviewed Prior HEP      [x]  Patient verbalized and/or demonstrated understanding of education provided. []  Patient unable to verbalize and/or demonstrate understanding of education provided. Will continue education. []  Barriers to learning: None    PLAN:  []  Plan of care initiated. Plan to see patient 2 times per week for 12 weeks to address the treatment planned outlined above.   [x]  Continue with current plan of care  []  Modify plan of care as follows:    []  Hold pending physician visit  []  Discharge    Time In 1430   Time Out 1515   Timed Code Minutes: 45 min   Total Treatment Time:  45 min       Electronically Signed by: [place] [id number]

## 2021-03-19 ENCOUNTER — HOSPITAL ENCOUNTER (OUTPATIENT)
Dept: PHYSICAL THERAPY | Age: 51
Setting detail: THERAPIES SERIES
Discharge: HOME | End: 2021-03-19
Payer: MEDICARE

## 2021-03-19 PROCEDURE — 97110 THERAPEUTIC EXERCISES: CPT

## 2021-03-19 PROCEDURE — 97012 MECHANICAL TRACTION THERAPY: CPT

## 2021-03-19 NOTE — PROGRESS NOTES
[address] [facility]  PHYSICAL THERAPY  [] EVALUATION  [x] DAILY NOTE (LAND) [] DAILY NOTE (AQUATIC ) [] PROGRESS NOTE [] DISCHARGE NOTE    [x] OUTPATIENT REHABILITATION CENTER [facility]   [] [place] [address]    [] [facility]   [] Shanti Lange    Date: 3/19/2021  Patient Name:  Paul Mclaughlin  : 1970  MRN: [id number]  CSN: [id number]    Referring Practitioner Dashawn Barakat MD   Diagnosis LOW BACK PAIN     Treatment Diagnosis Lumbago with left radiculopathy, right knee pain   Date of Evaluation 3/9/21    Additional Pertinent History High BP, Bi-polar, Anxiety disorder, Arthritis      Functional Outcome Measure Used Unruly-Damon   Functional Outcome Score 23/24 (3/9/21)       Insurance: Primary: Payor: Dana Jaramillo /  /  / ,   Secondary:    Authorization Information: Unlimited visits. Aquatics and modalities except massage covered. Telehealth covered   Visit # 4, 4/10 for progress note   Visits Allowed: Unlimited   Recertification Date: 06   Physician Follow-Up: 3-16-21   Physician Orders: 2x/week for 4 weeks: HEP, modalities, paraspinal and abdominal strengthening   History of Present Illness: Has stenosis in his neck and left arm goes numb - is better since injections. SUBJECTIVE: Patient states that he is having 7/10 pain today in his lower back. He reports that he continues to have numbness and tingling down his left leg. He also reports that yesterday he had some numbness and tingling in his right leg. TREATMENT   Precautions: Be careful with lifting   Pain: 7/10 low back, Right leg and left leg knee to foot    X in shaded column indicates activity completed today   Modalities Parameters/  Location  Notes   Traction to low back in supine with legs on stool Intermittent pull 150#/75#, 60 sec on/20 sec off, 33\" from bottom of mat to floor, 50% speed with pull x12 minutes X Increase pull NEXT SESSION.                 Manual Therapy Time/Technique  Notes Exercise/Intervention   Notes   Educated on unloading in prone for 5-10 minutes throughout the day with progressive propping as needed while monitoring symptoms   X    Prone lying prop up on elbows at conclusion of session  x3 minutes   X    Prone knee flexion    X Attempted but patient noted increased pain so held          Supine:        Abdominal bracing 10x 5 sec  X    PPT     Increased pain so held                                                Specific Interventions Next Treatment: NO MASSAGE per insurance, traction, pool therapy, core and leg strengthening but be careful with right knee, prone unloading and progression if relieving symptoms, posture education, manual therapy    Activity/Treatment Tolerance:  []  Patient tolerated treatment well  []  Patient limited by fatigue  [x]  Patient limited by pain   []  Patient limited by medical complications  []  Other:     Assessment: Performed intermittent lumbar traction again this date rather than static due to patient noting more relief this way from previous visits. He reported that his radicular symptoms seemed to stay the same today after traction. May attempt to increase pull next session due to patient stating at the end that he did not feel much today during traction. He states that he continues to receive some relief with prone lying at home. Performed prone lying while prop up on elbows at conclusion of session to decrease pain. Attempted to add prone knee flexion today with patient having increased pain so held. He stated that he had a more difficult time breathing while in the prone position today so limited with prone activity. He reports that his breathing has started to bother him the last couple of days. Patient slow to make positional changes during session due to increased pain. GOALS:  Patient Goal: To be able to get back to some activity with less pain.     Short Term Goals:  Time Frame: 4 weeks  1) Patient to report 10-15%

## 2021-03-26 ENCOUNTER — HOSPITAL ENCOUNTER (OUTPATIENT)
Dept: PHYSICAL THERAPY | Age: 51
Setting detail: THERAPIES SERIES
Discharge: HOME | End: 2021-03-26
Payer: MEDICARE

## 2021-03-26 PROCEDURE — 97110 THERAPEUTIC EXERCISES: CPT

## 2021-03-26 PROCEDURE — 97012 MECHANICAL TRACTION THERAPY: CPT

## 2021-03-26 NOTE — PROGRESS NOTES
[address] [facility]  PHYSICAL THERAPY  [] EVALUATION  [x] DAILY NOTE (LAND) [] DAILY NOTE (AQUATIC ) [] PROGRESS NOTE [] DISCHARGE NOTE    [x] OUTPATIENT REHABILITATION [facility]   [] [place] [address]    [] [facility]   [] Junior Picking    Date: 3/26/2021  Patient Name:  Adilia Agee   : 1970  MRN: [id number]  CSN: [id number]    Referring Practitioner Andreia Cassidy MD   Diagnosis LOW BACK PAIN     Treatment Diagnosis Lumbago with left radiculopathy, right knee pain   Date of Evaluation 3/9/21    Additional Pertinent History High BP, Bi-polar, Anxiety disorder, Arthritis      Functional Outcome Measure Used Unruly-Damon   Functional Outcome Score 23/24 (3/9/21)       Insurance: Primary: Payor: Emily Noble /  /  / ,   Secondary:    Authorization Information: Unlimited visits. Aquatics and modalities except massage covered. Telehealth covered   Visit # 5, 5/10 for progress note   Visits Allowed: Unlimited   Recertification Date: 3-3-38   Physician Follow-Up: 3-16-21   Physician Orders: 2x/week for 4 weeks: HEP, modalities, paraspinal and abdominal strengthening   History of Present Illness: Has stenosis in his neck and left arm goes numb - is better since injections. SUBJECTIVE: Patient reports that he missed his appointment on Monday because when he went to get out of bed he turned wrong. He reports that his pain increased and that he got numbness in his RUE after turning wrong. He states that the numbness has been in his RUE since then, but notes that his pain has gone down a little. He also reports that he continues to have numbness in his LLE>RLE. Patient rates his back pain a 8/10 today.    Precautions: Be careful with lifting   Pain: 8/10 low back, Right leg and left leg knee to foot, LLE> RLE numbness, RUE numbness    X in shaded column indicates activity completed today   Modalities Parameters/  Location  Notes   Traction to low back in supine with legs on stool Intermittent pull 165#/90#, 60 sec on/20 sec off, 33\" from bottom of mat to floor, 50% speed with pull x12 min. X                Manual Therapy Time/Technique  Notes                     Exercise/Intervention   Notes   Educated on unloading in prone for 5-10 minutes throughout the day with progressive propping as needed while monitoring symptoms       Prone lying prop up on elbows at conclusion of session  x3 minutes   X    Prone knee flexion     Attempted but patient noted increased pain so held          Supine:        Abdominal bracing 10x 5 sec  X    PPT     Increased pain so held    Abdominal bracing: hip adduction with ball squeeze  5x  3 sec  X Cues for abdominal bracing                                        Specific Interventions Next Treatment: NO MASSAGE per insurance, traction, pool therapy, core and leg strengthening but be careful with right knee, prone unloading and progression if relieving symptoms, posture education, manual therapy    Activity/Treatment Tolerance:  []  Patient tolerated treatment well  []  Patient limited by fatigue  [x]  Patient limited by pain   []  Patient limited by medical complications  []  Other:     Assessment: Patient notes that he continues to make frequent positional changes throughout the day due to increased pain and unable to find a comfortable position. Increased max and min pull with lumbar traction this session due to patient not noting much of a pull or change in his symptoms from last visit. Patient reported that his left leg numbness decreased some today after traction. He states that when the traction machine was at it's max pull that he had the greatest relief of his radicular symptoms. Added hooklying hip adduction exercise this session. He continues to be limited with exercise progressions due to increased pain. Discussed with patient that we will continue to attempt exercise progressions in the pool next visit.  He reported that with prone lying

## 2021-03-27 ENCOUNTER — NURSE TRIAGE (OUTPATIENT)
Dept: OTHER | Facility: CLINIC | Age: 51
End: 2021-03-27

## 2021-03-27 NOTE — TELEPHONE ENCOUNTER
Call received on [address]. Brief description of triage: No Triage. Pt called with non-COVID complaint. Triage indicates for patient to No Triage. Pt called with non-COVID complaint. Care advice provided. Recommended using local department of health website for testing locations and up to date information. Caller verbalizes understanding, denies any other questions or concerns; instructed to call back for any new or worsening symptoms. Reason for Disposition   Health Information question, no triage required and triager able to answer question    Answer Assessment - Initial Assessment Questions  1. REASON FOR CALL or QUESTION: \"What is your reason for calling today? \" or \"How can I best help you? \" or \"What question do you have that I can help answer? \"      Pt called COVID line with non-COVID complaint    Protocols used: INFORMATION ONLY CALL - NO TRIAGE[facility]

## 2021-03-29 ENCOUNTER — HOSPITAL ENCOUNTER (OUTPATIENT)
Dept: PHYSICAL THERAPY | Age: 51
Setting detail: THERAPIES SERIES
Discharge: HOME | End: 2021-03-29
Payer: MEDICARE

## 2021-03-29 PROCEDURE — 97113 AQUATIC THERAPY/EXERCISES: CPT

## 2021-03-29 NOTE — PROGRESS NOTES
[address] [facility]  PHYSICAL THERAPY  [] EVALUATION  [] DAILY NOTE (LAND) [x] DAILY NOTE (AQUATIC ) [] PROGRESS NOTE [] DISCHARGE NOTE    [x] OUTPATIENT REHABILITATION CENTER [facility]   [] [place] [address]    [] [facility]   [] Chilton Cheadle    Date: 3/29/2021  Patient Name:  Asher Pulido  : 1970  MRN: [id number]  CSN: [id number]    Referring Practitioner Shari Eckert MD   Diagnosis LOW BACK PAIN     Treatment Diagnosis Lumbago with left radiculopathy, right knee pain   Date of Evaluation 3/9/21    Additional Pertinent History High BP, Bi-polar, Anxiety disorder, Arthritis      Functional Outcome Measure Used Unruly-Damon   Functional Outcome Score 23/24 (3/9/21)       Insurance: Primary: Payor: Nancy Hobbs /  /  / ,   Secondary:    Authorization Information: Unlimited visits. Aquatics and modalities except massage covered. Telehealth covered   Visit # 6, 6/10 for progress note   Visits Allowed: Unlimited   Recertification Date:    Physician Follow-Up: 3-16-21   Physician Orders: 2x/week for 4 weeks: HEP, modalities, paraspinal and abdominal strengthening   History of Present Illness: Has stenosis in his neck and left arm goes numb - is better since injections. SUBJECTIVE: Patient reports that his right knee has been more swollen the past few days. He states that his pain is doing a little bit better and is a 7/10 today. AQUATICS TREATMENT   Precautions: Be careful with lifting   Pain: 10 low back, Right leg and left leg knee to foot, LLE> RLE numbness, RUE numbness    X in shaded column indicates activity completed today   Exercise/Intervention Sets/Sec  Notes   Walk Forward 2 laps  X In 4'10\"   Walk Backward 1 lap  X In 4'10\" - caused pain so stopped   Walk Sideways 2 laps  X In 4'10\"          Lower Extremity Exercises:     In 4'10\" for all exercises   Heel/Toe Raises 10x each  X Each foot lifted separately for toe raises due to pain in knee Marches 10x bilat  X    Squats       2 Way Hip - Flex and abd 10x bilat  X    Hamstring Curls 10x bilat  X Limited range in right knee along with pain   Lunges       Step-Ups              Lower Extremity Stretches:       Standing R HS stretch with foot on ledge in 4'10\" 2 sets  15 sec  X           Seated Exercises:              Upper Extremity Exercises: All done in 4'10\"   Shoulder Flexion 10x bilat  X    Shoulder ABD/ADD 10x bilat  X    Shoulder Horizontal ABD/ADD 10x bilat  X Only performed with RUE today due to increased pain with LUE. Shoulder IR/ER       Shoulder Circles       Shoulder Shrugs       Rows       Bicep Curls              Upper Extremity Stretches:              Balance:              Dynamic Gait:              Deep Water:    Done in 8' Hang 5 min  X    Bicycle       Hip ABD/ADD 1 min  X    Hip Flex/Ext 1 min  X      Specific Interventions Next Treatment: NO MASSAGE per insurance, traction, pool therapy, core and leg strengthening but be careful with right knee, prone unloading and progression if relieving symptoms, posture education, manual therapy    Activity/Treatment Tolerance:  []  Patient tolerated treatment well  []  Patient limited by fatigue  [x]  Patient limited by pain   []  Patient limited by medical complications  []  Other:     Assessment: Performed exercises in 4 foot 10 inch to 5 foot water due to patient noting more relief in the deeper water while performing exercises. Cues provided for proper technique while performing exercises to ensure maximal muscle activation. Cues also provided for posture during exercises. He continues to be limited with progressions due to increased pain levels. He was unable to perform shoulder horizontal abduction exercise on his LUE today due to increased pain. He reported that his pain decreased some after the deep water exercises, but he stated that once he got out of the water his pain increased right away.  Patient had antalgic gait pattern when walking on land and had a difficult time weight shifting over his RLE. GOALS:  Patient Goal: To be able to get back to some activity with less pain. Short Term Goals:  Time Frame: 4 weeks  1) Patient to report 10-15% decrease in low back pain for ease with sleeping  2) Patient to report able to consistently decrease left leg symptoms to tolerate standing >15 minutes at a time  3) Patient to start strength program without pain increase to provide stability to core with doing activity around the home  4) Patient to report 10-15% relief to right knee pain to allow for less limp with gait to help decrease stress on his back. 5) Patient to demonstrate 25% increase in lumbar range without pain for ease with getting dressed      Long Term Goals:  Time Frame: 12 weeks  1) Patient to be independent with home program to perform all daily activity with minimal to no pain. Patient Education:   []  HEP/Education Completed: Added abdominal bracing. Education on continued prone lying.  [facility] Access Code:  []  No new Education completed  [x]  Reviewed Prior HEP      [x]  Patient verbalized and/or demonstrated understanding of education provided. []  Patient unable to verbalize and/or demonstrate understanding of education provided. Will continue education. []  Barriers to learning: None    PLAN:  []  Plan of care initiated. Plan to see patient 2 times per week for 12 weeks to address the treatment planned outlined above.   [x]  Continue with current plan of care  []  Modify plan of care as follows:    []  Hold pending physician visit  []  Discharge    Time In 0945   Time Out 1028   Timed Code Minutes:  43 min   Total Treatment Time:   43 min       Electronically Signed by: Andrea Vazquez

## 2021-04-01 ENCOUNTER — HOSPITAL ENCOUNTER (EMERGENCY)
Age: 51
Discharge: HOME | End: 2021-04-01
Payer: MEDICARE

## 2021-04-01 ENCOUNTER — APPOINTMENT (OUTPATIENT)
Dept: INTERVENTIONAL RADIOLOGY/VASCULAR | Age: 51
End: 2021-04-01
Payer: MEDICARE

## 2021-04-01 ENCOUNTER — TELEPHONE (OUTPATIENT)
Dept: FAMILY MEDICINE CLINIC | Age: 51
End: 2021-04-01

## 2021-04-01 ENCOUNTER — HOSPITAL ENCOUNTER (OUTPATIENT)
Dept: PHYSICAL THERAPY | Age: 51
Setting detail: THERAPIES SERIES
Discharge: HOME | End: 2021-04-01
Payer: MEDICARE

## 2021-04-01 VITALS
SYSTOLIC BLOOD PRESSURE: 135 MMHG | HEIGHT: 71 IN | DIASTOLIC BLOOD PRESSURE: 91 MMHG | RESPIRATION RATE: 18 BRPM | HEART RATE: 87 BPM | WEIGHT: 300 LBS | TEMPERATURE: 98.5 F | BODY MASS INDEX: 42 KG/M2 | OXYGEN SATURATION: 95 %

## 2021-04-01 DIAGNOSIS — M79.89 LEG SWELLING: ICD-10-CM

## 2021-04-01 DIAGNOSIS — G89.4 CHRONIC PAIN SYNDROME: ICD-10-CM

## 2021-04-01 DIAGNOSIS — M79.604 BILATERAL LEG PAIN: ICD-10-CM

## 2021-04-01 DIAGNOSIS — M79.604 ACUTE PAIN OF RIGHT LOWER EXTREMITY: Primary | ICD-10-CM

## 2021-04-01 DIAGNOSIS — R60.0 BILATERAL LOWER EXTREMITY EDEMA: ICD-10-CM

## 2021-04-01 DIAGNOSIS — M79.89 RIGHT LEG SWELLING: ICD-10-CM

## 2021-04-01 DIAGNOSIS — M79.604 RIGHT LEG PAIN: Primary | ICD-10-CM

## 2021-04-01 DIAGNOSIS — M79.605 BILATERAL LEG PAIN: ICD-10-CM

## 2021-04-01 PROCEDURE — 93970 EXTREMITY STUDY: CPT

## 2021-04-01 PROCEDURE — 99282 EMERGENCY DEPT VISIT SF MDM: CPT

## 2021-04-01 PROCEDURE — 97110 THERAPEUTIC EXERCISES: CPT

## 2021-04-01 ASSESSMENT — PAIN SCALES - GENERAL: PAINLEVEL_OUTOF10: 8

## 2021-04-01 ASSESSMENT — PAIN DESCRIPTION - LOCATION: LOCATION: BACK;LEG

## 2021-04-01 ASSESSMENT — PAIN DESCRIPTION - FREQUENCY: FREQUENCY: CONTINUOUS

## 2021-04-01 NOTE — TELEPHONE ENCOUNTER
[facility] from physical therapy called to notify that Walker Dickerson is having pain and swelling of his right calf. Onset this morning, no recent injury. He noticed the pain/swelling when he stepped down from his car. She reports that his right calf is swollen and mildly red as well. Advised her to go ahead and send him to the ER for further evaluation for possible DVT. She verbalized understanding and agreement and would send patient to the ER.

## 2021-04-01 NOTE — ED NOTES
Patient presents to the ED with complaints of needing a doppler done on his right lower leg. States he has chronic pain in both his legs and back and that is the pain he notices first. States this morning around 0845 he noticed a cramping pain in his right lower leg.      Charity Rodriguez  04/01/21 7879

## 2021-04-01 NOTE — ED PROVIDER NOTES
R-3Normal      Handicap Placard MISC Starting Thu 2/18/2021, Disp-1 each, R-0, PrintExpires 2/17/2026      Elastic Bandages & Supports (KNEE BRACE/HINGED/REGULAR) MISC 1 Device by Does not apply route daily, Disp-1 each, R-0Print      lamoTRIgine (LAMICTAL) 25 MG tablet Historical Med      NARCAN 4 MG/0.1ML LIQD nasal spray DAWHistorical Med      oxyCODONE (ROXICODONE) 5 MG immediate release tablet Historical Med      QUEtiapine (SEROQUEL) 100 MG tablet Take 100 mg by mouth nightlyHistorical Med      metoprolol succinate (TOPROL XL) 25 MG extended release tablet Take 1 tablet by mouth daily, Disp-90 tablet, R-1Normal      gabapentin (NEURONTIN) 300 MG capsule Take 1 capsule by mouth 3 times daily for 90 days. Intended supply: 90 days, Disp-270 capsule, R-0Normal      baclofen (LIORESAL) 10 MG tablet Historical Med      varenicline (CHANTIX STARTING MONTH EVENS) 0.5 MG X 11 & 1 MG X 42 tablet Take by mouth., Disp-1 box, R-0Normal             ALLERGIES     is allergic to tylenol [acetaminophen] and celebrex [celecoxib]. FAMILY HISTORY     He indicated that the status of his neg hx is unknown.   family history is not on file. SOCIAL HISTORY    reports that he quit smoking about 6 weeks ago. His smoking use included cigarettes. He started smoking about 15 years ago. He smoked 0.25 packs per day. He has never used smokeless tobacco. He reports that he does not drink alcohol or use drugs. PHYSICAL EXAM     INITIAL VITALS:  height is 5' 11\" (1.803 m) and weight is 300 lb (136.1 kg). His oral temperature is 98.5 °F (36.9 °C). His blood pressure is 135/91 (abnormal) and his pulse is 87. His respiration is 18 and oxygen saturation is 95%. Physical Exam  Vitals signs and nursing note reviewed. Constitutional:       General: He is not in acute distress. Appearance: He is well-developed. He is not toxic-appearing or diaphoretic. HENT:      Head: Normocephalic and atraumatic.       Right Ear: Hearing normal. Left Ear: Hearing normal.      Nose: Nose normal. No nasal deformity. Eyes:      General: Lids are normal.      Conjunctiva/sclera: Conjunctivae normal.   Neck:      Musculoskeletal: No neck rigidity. Trachea: Trachea normal. No tracheal deviation. Cardiovascular:      Rate and Rhythm: Normal rate and regular rhythm. Pulses:           Dorsalis pedis pulses are 2+ on the right side and 2+ on the left side. Posterior tibial pulses are 2+ on the right side and 2+ on the left side. Pulmonary:      Effort: Pulmonary effort is normal. No tachypnea. Abdominal:      General: There is no distension. Palpations: Abdomen is soft. Musculoskeletal:      Right lower leg: He exhibits tenderness. He exhibits no bony tenderness. 2+ Pitting Edema present. Left lower le+ Pitting Edema present. Comments: There is good plantar and dorsal flexion of the feet and toes. Skin:     General: Skin is warm and dry. Coloration: Skin is not pale. Findings: No rash. Neurological:      Mental Status: He is alert. GCS: GCS eye subscore is 4. GCS verbal subscore is 5. GCS motor subscore is 6. Sensory: No sensory deficit. Gait: Gait normal.   Psychiatric:         Speech: Speech normal.         Behavior: Behavior normal. Behavior is cooperative. Thought Content: Thought content normal.         DIFFERENTIAL DIAGNOSIS:   Including but not limited to: Muscle strain, DVT, neuralgia    DIAGNOSTIC RESULTS     EKG: All EKG's are interpreted by the[facility] Phys[facility] who either signs or Co-signs this chart in the absence of a cardiologist.  None    RADIOLOGY: non-plain film images(s) such as CT,Ultrasound and MRI are read by the radiologist.  Plain radiographic images are visualized and preliminarily interpreted by the emergency physician unless otherwise stated below. VL DUP LOWER EXTREMITY VENOUS BILATERAL   Final Result   No evidence of a DVT.    Bilateral calf subcutaneous edema. **This report has been created using voice recognition software. It may contain minor errors which are inherent in voice recognition technology. **      Final report electronically signed by Dr. Malissa Wilkinson on 4/1/2021 12:33 PM          LABS:   Labs Reviewed - No data to display    EMERGENCY DEPARTMENT COURSE:   Vitals:    Vitals:    04/01/21 0947   BP: (!) 135/91   Pulse: 87   Resp: 18   Temp: 98.5 °F (36.9 °C)   TempSrc: Oral   SpO2: 95%   Weight: 300 lb (136.1 kg)   Height: 5' 11\" (1.803 m)       MDM:  The patient was seen and evaluated by me in the intake area. Vital signs were reviewed. Physical exam revealed bilateral pitting edema with tenderness to the right calf. The patient was neurovascularly intact. Appropriate testing was ordered. Results were reviewed by me upon completion. Results showed no DVT. Results were discussed with the patient and discharge plan was discussed. I have given the patient strict written and verbal instructions about care at home, follow-up, and signs and symptoms of worsening of condition and they did verbalize understanding. CRITICAL CARE:   None    CONSULTS:  None    PROCEDURES:  None    FINAL IMPRESSION      1. Right leg pain    2. Leg swelling    3. Bilateral leg pain    4. Bilateral lower extremity edema    5. Chronic pain syndrome          DISPOSITION/PLAN     1. Right leg pain    2. Leg swelling    3. Bilateral leg pain    4. Bilateral lower extremity edema    5.  Chronic pain syndrome        PATIENT REFERRED TO:  Liss Valdez, APRN - [facility]  [address] [place]  [phone]    Schedule an appointment as soon as possible for a visit         DISCHARGE MEDICATIONS:  Discharge Medication List as of 4/1/2021 12:45 PM          (Please note that portions of this note were completed with a voice recognition program.  Efforts were made to edit the dictations but occasionally words are mis-transcribed.)    Tom Munroe PA-C 04/06/21 12:47 AM    Tom Found, NEWTON Santos Found, [place]  04/06/21 3663

## 2021-04-01 NOTE — PROGRESS NOTES
muscle. Unruly Damon Disability Scale for Low Back Pain   I stay at home most of the time because of the pain in my back. Yes   I change position frequently to try and make my back comfortable. Yes   I walk more slowly than usual because of the pain in my back. Yes   Because of the pain in my back, I am not doing any of the jobs that I usually do around the house. Yes   Because of the pain in my back, I use a handrail to get upstairs. Yes   Because of the pain in my back, I lie down to rest more often. Yes   Because of the pain in my back, I have to hold onto something to get out of a reclining chair. Yes   Because of the pain in my back, I ask other people to do things for me. Yes   I get dressed more slowly than usual because of the pain in my back. Yes   I only stand up for short periods of time because of the pain in my back. Yes   Because of the pain in my back, I try not to bend or kneel down. Yes   I find it difficult to get out of a chair because of the pain in my back. Yes   My back hurts most of the time. Yes   I find it difficult to turn over in bed because of the pain in my back. Yes   My appetite is not very good because of the pain in my back. Yes   I have trouble putting on my socks (or stockings) because of the pain in my back. Yes   I only walk short distances because of the pain in my back. Yes   I sleep less because of the pain in my back. Yes   Because of the pain in my back, I get dressed with help from someone else. No   I sit down most of the day because of the pain in my back. Yes   I avoid heavy jobs around the house because of the pain in my back. Yes   Because of the pain in my back, I am more irritable and bad tempered with people. Yes   Because of the pain in my back, I go upstairs more slowly than usual. Yes   I stay in bed most of the time because of the pain in my back. Yes   TOTAL NUMBER OF YES RESPONSES 23/24     No treatment this date - see below.     Specific Interventions Next Treatment: NO MASSAGE per insurance, traction, core and leg strengthening but be careful with right knee, prone unloading and progression if relieving symptoms, posture education, manual therapy    Activity/Treatment Tolerance:  []  Patient tolerated treatment well  []  Patient limited by fatigue  [x]  Patient limited by pain   []  Patient limited by medical complications  []  Other:     Assessment: Patient arriving to therapy with new recent pain in right calf. Patient's right lower leg swollen and tight, slight warmth and redness, pitting edema and significant pain with Jeanette's test. Left lower leg swollen and tight, slight redness and warmth but no pitting edema and no pain with Jeanette's. PT called Renée Morales office, since patient was scheduled to see him today at 11:00, and [facility] suggested sending patient to ER to have DVT ruled out. PT transported patient to ER in a wheelchair. Patient making minimal to no progress with therapy. He is having less numbness in left leg and has increased lumbar range making it easier to dress. His pain overall has not changed. Still limited with activity and endurance. Since patient is getting mild relief with traction and is gaining range, therapy will continue for 2-4 more weeks to continue with the traction and progress to a land therapy strengthening program to see if can tolerate and if helps with strength and endurance for activity. Will also wait and see what happens after testing for DVT and see what family doctor's office ssays regarding more therapy. GOALS:  Patient Goal: To be able to get back to some activity with less pain. Short Term Goals:  Time Frame: 4 weeks  1) Patient to report 10-15% decrease in low back pain for ease with sleeping  [] Goal Met [x] Goal Not Met [x] Continue Goal [] Discontinue Goal  [] Revise Goal  Goal Assessment: Patient reports back pain has not changed since therapy.   2) Patient to report able to consistently decrease left leg symptoms to tolerate standing >15 minutes at a time  [] Goal Met [x] Goal Not Met [x] Continue Goal [] Discontinue Goal  [] Revise Goal  Goal Assessment: Patient reports still having consistent left leg symptoms but has decreased from an 8/10 to 5/10. States can stand for 10-15 minutes before pain makes him sit. 3) Patient to start strength program without pain increase to provide stability to core with doing activity around the home  [x] Goal Met [] Goal Not Met [] Continue Goal [] Discontinue Goal  [x] Revise Goal  Goal Assessment: Patient has started strength program in the pool but no much of one on land. New Goal: Patient to start and progress strength program on land without pain increase to provide stability to core with doing activity around the home  4) Patient to report 10-15% relief to right knee pain to allow for less limp with gait to help decrease stress on his back. [] Goal Met [x] Goal Not Met [x] Continue Goal [] Discontinue Goal  [] Revise Goal  Goal Assessment: Patient reports no change in knee pain. 5) Patient to demonstrate 25% increase in lumbar range without pain for ease with getting dressed  [x] Goal Met [] Goal Not Met [] Continue Goal [] Discontinue Goal  [x] Revise Goal  New Goal: Patient to demonstrate >25% increase in lumbar range without pain for ease with getting dressed      Long Term Goals:  Time Frame: 12 weeks  1) Patient to be independent with home program to perform all daily activity with minimal to no pain. [] Goal Met [x] Goal Not Met [x] Continue Goal [] Discontinue Goal  [] Revise Goal  Goal Assessment: Patient has not had full progression to land therapy so will continue to work on. Patient Education:   [x]  HEP/Education Completed: Call therapist regarding what finds out in the ER and what doctor's office has to say. Patient asked about cancelling his sleep study and PT encouraged patient to call doctor before doing that.     [employer] Access Code:  []  No new Education completed  [x]  Reviewed Prior HEP      [x]  Patient verbalized and/or demonstrated understanding of education provided. []  Patient unable to verbalize and/or demonstrate understanding of education provided. Will continue education. []  Barriers to learning: None    PLAN:  []  Plan of care initiated. Plan to see patient 2 times per week for 8 weeks to address the treatment planned outlined above.   [x]  Continue with current plan of care  []  Modify plan of care as follows:    []  Hold pending physician visit  []  Discharge    Time In 0900   Time Out 0930   Timed Code Minutes:  30 min   Total Treatment Time:   30 min       Electronically Signed by: [place] [id number]

## 2021-04-05 ENCOUNTER — HOSPITAL ENCOUNTER (OUTPATIENT)
Dept: PHYSICAL THERAPY | Age: 51
Setting detail: THERAPIES SERIES
Discharge: HOME | End: 2021-04-05
Payer: MEDICARE

## 2021-04-05 PROCEDURE — 97012 MECHANICAL TRACTION THERAPY: CPT

## 2021-04-05 PROCEDURE — 97110 THERAPEUTIC EXERCISES: CPT

## 2021-04-05 NOTE — PROGRESS NOTES
[address] [facility]  PHYSICAL THERAPY  [] EVALUATION  [x] DAILY NOTE (LAND) [] DAILY NOTE (AQUATIC ) [] PROGRESS NOTE [] DISCHARGE NOTE    [x] OUTPATIENT REHABILITATION CENTER [facility]   [] [place] [address]    [] [facility]   [] [place]    Date: 2021  Patient Name:  Rylan Jennings  : 1970  MRN: [id number]  CSN: [id number]    Referring Practitioner Xavier Hurtado MD   Diagnosis LOW BACK PAIN     Treatment Diagnosis Lumbago with left radiculopathy, right knee pain   Date of Evaluation 3/9/21    Additional Pertinent History High BP, Bi-polar, Anxiety disorder, Arthritis      Functional Outcome Measure Used Unruly-Damon   Functional Outcome Score  (3/9/21), PN  (21)      Insurance: Primary: Payor: Heath Ospina /  /  / ,   Secondary:    Authorization Information: Unlimited visits. Aquatics and modalities except massage covered. Telehealth covered   Visit # 8, 8/10 for progress note   Visits Allowed: Unlimited   Recertification Date: 5-3-42   Physician Follow-Up: 3-16-21   Physician Orders: 2x/week for 4 weeks: HEP, modalities, paraspinal and abdominal strengthening   History of Present Illness: Has stenosis in his neck and left arm goes numb - is better since injections. SUBJECTIVE: Patient presents today with 8/10 and tingling and numbness down the L LE and knee pain rating 10/10. Precautions: Be careful with lifting   Pain: 8/10 low back, Right leg and left leg knee to foot, LLE> RLE numbness, RUE numbness     X in shaded column indicates activity completed today   Modalities Parameters/  Location   Notes   Traction to low back in supine with legs on stool Intermittent pull 165#/90#, 60 sec on/20 sec off, 33\" from bottom of mat to floor, 50% speed with pull x12 min.  X                         Manual Therapy Time/Technique   Notes                                 Exercise/Intervention     Notes   Educated on unloading in prone for 5-10 minutes throughout the day with progressive propping as needed while monitoring symptoms           Prone lying prop up on elbows at conclusion of session  x3 minutes    X     Prone knee flexion        Attempted but patient noted increased pain so held               Supine:            Abdominal bracing 10x 5 sec  X     PPT        Increased pain so held    Abdominal bracing: hip adduction with ball squeeze  5x  3 sec  X Cues for abdominal bracing                                                                        Specific Interventions Next Treatment: NO MASSAGE per insurance, traction, core and leg strengthening but be careful with right knee, prone unloading and progression if relieving symptoms, posture education, manual therapy    Activity/Treatment Tolerance:  []  Patient tolerated treatment well  []  Patient limited by fatigue  [x]  Patient limited by pain   []  Patient limited by medical complications  []  Other:     Assessment: Patient fins relief in numbness with traction in L LE. Withheld further progressions due to limited tolerance with exercises. GOALS:  Patient Goal: To be able to get back to some activity with less pain. Short Term Goals:  Time Frame: 4 weeks  1) Patient to report 10-15% decrease in low back pain for ease with sleeping    2) Patient to report able to consistently decrease left leg symptoms to tolerate standing >15 minutes at a time     3)  Patient to start and progress strength program on land without pain increase to provide stability to core with doing activity around the home  4) Patient to report 10-15% relief to right knee pain to allow for less limp with gait to help decrease stress on his back. 5) Patient to demonstrate >25% increase in lumbar range without pain for ease with getting dressed      Long Term Goals:  Time Frame: 12 weeks  1) Patient to be independent with home program to perform all daily activity with minimal to no pain.       Patient Education:   [x] HEP/Education Completed: Call therapist regarding what finds out in the ER and what doctor's office has to say. Patient asked about cancelling his sleep study and PT encouraged patient to call doctor before doing that.  [employer] Access Code:  []  No new Education completed  [x]  Reviewed Prior HEP      [x]  Patient verbalized and/or demonstrated understanding of education provided. []  Patient unable to verbalize and/or demonstrate understanding of education provided. Will continue education. []  Barriers to learning: None    PLAN:  []  Plan of care initiated. Plan to see patient 2 times per week for 8 weeks to address the treatment planned outlined above.   [x]  Continue with current plan of care  []  Modify plan of care as follows:    []  Hold pending physician visit  []  Discharge    Time In 1602   Time Out 1635   Timed Code Minutes:  33 min   Total Treatment Time:   33 min       Electronically Signed by: Kp Bautista

## 2021-04-06 ASSESSMENT — ENCOUNTER SYMPTOMS
RHINORRHEA: 0
BACK PAIN: 1
VOMITING: 0
NAUSEA: 0
PHOTOPHOBIA: 0
SHORTNESS OF BREATH: 0

## 2021-04-09 ENCOUNTER — HOSPITAL ENCOUNTER (OUTPATIENT)
Dept: PHYSICAL THERAPY | Age: 51
Setting detail: THERAPIES SERIES
Discharge: HOME | End: 2021-04-09
Payer: MEDICARE

## 2021-04-09 PROCEDURE — 97012 MECHANICAL TRACTION THERAPY: CPT

## 2021-04-09 PROCEDURE — 97110 THERAPEUTIC EXERCISES: CPT

## 2021-04-09 NOTE — PROGRESS NOTES
[address] [facility]  PHYSICAL THERAPY  [] EVALUATION  [x] DAILY NOTE (LAND) [] DAILY NOTE (AQUATIC ) [] PROGRESS NOTE [] DISCHARGE NOTE    [x] OUT[facility] - [place]   [] [facility]    [] [facility]   [] Laura Arzola    Date: 2021  Patient Name:  Ariane Haq  : 1970  MRN: [id number]  CSN: [id number]    Referring Practitioner Jt Villeda MD   Diagnosis LOW BACK PAIN     Treatment Diagnosis Lumbago with left radiculopathy, right knee pain   Date of Evaluation 3/9/21    Additional Pertinent History High BP, Bi-polar, Anxiety disorder, Arthritis      Functional Outcome Measure Used Unruly-Damon   Functional Outcome Score  (3/9/21), PN  (21)      Insurance: Primary: Payor: Daryl Read /  /  / ,   Secondary:    Authorization Information: Unlimited visits. Aquatics and modalities except massage covered. Telehealth covered   Visit # 9, 9/10 for progress note   Visits Allowed: Unlimited   Recertification Date: 21   Physician Follow-Up: 3-16-21   Physician Orders: 2x/week for 4 weeks: HEP, modalities, paraspinal and abdominal strengthening   History of Present Illness: Has stenosis in his neck and left arm goes numb - is better since injections. SUBJECTIVE: Patient reports that the numbness in his left leg has decreased by 50% since last session. He states that the numbness is only on half of his foot and then up into his ankle. He states that he is having 6-7/10 pain in his lower back and 7/10 knee pain. Precautions: Be careful with lifting   Pain: 6-7/10 low back, Right leg and left leg knee to foot, LLE> RLE numbness, RUE numbness     X in shaded column indicates activity completed today   Modalities Parameters/  Location   Notes   Traction to low back in supine with legs on stool Intermittent pull 165#/90#, 60 sec on/20 sec off, 33\" from bottom of mat to floor, 50% speed with pull x12 min.  X back pain for ease with sleeping    2) Patient to report able to consistently decrease left leg symptoms to tolerate standing >15 minutes at a time     3)  Patient to start and progress strength program on land without pain increase to provide stability to core with doing activity around the home  4) Patient to report 10-15% relief to right knee pain to allow for less limp with gait to help decrease stress on his back. 5) Patient to demonstrate >25% increase in lumbar range without pain for ease with getting dressed      Long Term Goals:  Time Frame: 12 weeks  1) Patient to be independent with home program to perform all daily activity with minimal to no pain. Patient Education:   [x]  HEP/Education Completed: Added abdominal bracing with mini marches and PPT.  [employer] Access Code:  []  No new Education completed  [x]  Reviewed Prior HEP      [x]  Patient verbalized and/or demonstrated understanding of education provided. []  Patient unable to verbalize and/or demonstrate understanding of education provided. Will continue education. []  Barriers to learning: None    PLAN:  []  Plan of care initiated. Plan to see patient 2 times per week for 8 weeks to address the treatment planned outlined above.   [x]  Continue with current plan of care  []  Modify plan of care as follows:    []  Hold pending physician visit  []  Discharge    Time In 1301   Time Out 1340   Timed Code Minutes:    39 min   Total Treatment Time:    39 min       Electronically Signed by: Hedy Jefferson

## 2021-04-12 ENCOUNTER — HOSPITAL ENCOUNTER (OUTPATIENT)
Dept: PHYSICAL THERAPY | Age: 51
Setting detail: THERAPIES SERIES
Discharge: HOME | End: 2021-04-12
Payer: MEDICARE

## 2021-04-12 ENCOUNTER — OFFICE VISIT (OUTPATIENT)
Dept: FAMILY MEDICINE CLINIC | Age: 51
End: 2021-04-12
Payer: MEDICARE

## 2021-04-12 VITALS
WEIGHT: 297 LBS | BODY MASS INDEX: 41.58 KG/M2 | HEART RATE: 114 BPM | OXYGEN SATURATION: 99 % | HEIGHT: 71 IN | RESPIRATION RATE: 14 BRPM | SYSTOLIC BLOOD PRESSURE: 134 MMHG | TEMPERATURE: 98.7 F | DIASTOLIC BLOOD PRESSURE: 76 MMHG

## 2021-04-12 DIAGNOSIS — R60.9 DEPENDENT EDEMA: ICD-10-CM

## 2021-04-12 DIAGNOSIS — F17.210 CIGARETTE NICOTINE DEPENDENCE WITHOUT COMPLICATION: Primary | ICD-10-CM

## 2021-04-12 DIAGNOSIS — M48.061 DEGENERATIVE LUMBAR SPINAL STENOSIS: ICD-10-CM

## 2021-04-12 DIAGNOSIS — M51.26 HERNIATED LUMBAR INTERVERTEBRAL DISC: ICD-10-CM

## 2021-04-12 DIAGNOSIS — F41.9 ANXIETY: ICD-10-CM

## 2021-04-12 DIAGNOSIS — R00.0 TACHYCARDIA: ICD-10-CM

## 2021-04-12 DIAGNOSIS — I10 ESSENTIAL HYPERTENSION: ICD-10-CM

## 2021-04-12 PROCEDURE — 1036F TOBACCO NON-USER: CPT | Performed by: NURSE PRACTITIONER

## 2021-04-12 PROCEDURE — G8417 CALC BMI ABV UP PARAM F/U: HCPCS | Performed by: NURSE PRACTITIONER

## 2021-04-12 PROCEDURE — 3017F COLORECTAL CA SCREEN DOC REV: CPT | Performed by: NURSE PRACTITIONER

## 2021-04-12 PROCEDURE — 99214 OFFICE O/P EST MOD 30 MIN: CPT | Performed by: NURSE PRACTITIONER

## 2021-04-12 PROCEDURE — 97012 MECHANICAL TRACTION THERAPY: CPT

## 2021-04-12 PROCEDURE — G8427 DOCREV CUR MEDS BY ELIG CLIN: HCPCS | Performed by: NURSE PRACTITIONER

## 2021-04-12 PROCEDURE — 97110 THERAPEUTIC EXERCISES: CPT

## 2021-04-12 RX ORDER — GABAPENTIN 600 MG/1
600 TABLET ORAL 3 TIMES DAILY
Qty: 270 TABLET | Refills: 1 | Status: SHIPPED | OUTPATIENT
Start: 2021-04-12 | End: 2021-06-11 | Stop reason: ALTCHOICE

## 2021-04-12 RX ORDER — BUSPIRONE HYDROCHLORIDE 7.5 MG/1
7.5 TABLET ORAL 2 TIMES DAILY
Qty: 60 TABLET | Refills: 1 | Status: SHIPPED | OUTPATIENT
Start: 2021-04-12 | End: 2021-06-07

## 2021-04-12 RX ORDER — HYDROCHLOROTHIAZIDE 12.5 MG/1
12.5 TABLET ORAL DAILY
Qty: 30 TABLET | Refills: 3 | Status: SHIPPED | OUTPATIENT
Start: 2021-04-12 | End: 2021-08-04

## 2021-04-12 RX ORDER — OXYCODONE HYDROCHLORIDE 10 MG/1
TABLET ORAL
COMMUNITY
Start: 2021-03-30

## 2021-04-12 NOTE — PROGRESS NOTES
[facility] at / [facility] [address], [address]  Chief Complaint   Patient presents with    Follow-up     chronic conditions     Nicotine Dependence      getting better     Other     pt states he has not taken Propanolol in 2 weeks  he ran out        History obtained from chart review and the patient. SUBJECTIVE:  Chayito Lyle is a 49 y.o. male that presents today for follow up HTN, back pain    He is following with Dr Maria Esther Jacome (Pain Management in [facility]) for chronic back pain. He has several bulging discs in his lumbar spine as well as lumbar and cervical spinal stenosis. He is getting injections in his back, and he currently taking Narcan, Oxycodone and Baclofen. Since last appt he has resumed Gabapentin. He does feel like the Gabapentin does help some, has chronic pain around 7-8/10. He has had some days where doesn't get any numbness in his legs, and other days where he does still get numbness. Nicotine Dependence  He has been taking the Chantix, and he is still smoking. He is smoking maybe 1-2 cigarettes/day. Admits that he has started smoking mostly because of anxiety. Anxiety  Triggers are usually being around a lot of ppl, enclosed rooms. He gets anxiety daily. No panic attacks lately. He went to the ER last week for leg swelling and calf pain. No DVT. He reports though that now the swelling is much improved. Swelling is generally better in the AM and gradually worsens. HTN    Does patient check BP regularly at home? - Yes - occasionally 106-146/100  Current Medication regimen - metoprolol,but he has run out recently  Tolerating medications well? - yes    Shortness of breath or chest pain? No  Headache or visual complaints? No  Neurologic changes like confusion? No  Extremity edema?  Yes    BP Readings from Last 3 Encounters:   04/12/21 134/76   04/05/21 (!) 150/108   04/01/21 (!) 135/91     Age/Gender Health Maintenance    Lipid -   No results found for: 0. 1    Smokeless tobacco: Never Used   Substance and Sexual Activity    Alcohol use: Never     Frequency: Never    Drug use: Never    Sexual activity: Not on file   Lifestyle    Physical activity     Days per week: Not on file     Minutes per session: Not on file    Stress: Not on file   Relationships    Social connections     Talks on phone: Not on file     Gets together: Not on file     Attends Mosque service: Not on file     Active member of club or organization: Not on file     Attends meetings of clubs or organizations: Not on file     Relationship status: Not on file    Intimate partner violence     Fear of current or ex partner: Not on file     Emotionally abused: Not on file     Physically abused: Not on file     Forced sexual activity: Not on file   Other Topics Concern    Not on file   Social History Narrative    Not on file       Family History   Problem Relation Age of Onset    Colon Cancer Neg Hx     Esophageal Cancer Neg Hx     Liver Cancer Neg Hx     Rectal Cancer Neg Hx     Stomach Cancer Neg Hx          I have reviewed the patient's past medical history, past surgical history, allergies, medications, social and family history and I have made updates where appropriate.       Review of Systems  Positive responses are highlighted in bold    Constitutional:  Fever, Chills, Night Sweats, Fatigue, Unexpected changes in weight  Eyes:  Eye discharge, Eye pain, Eye redness, Visual disturbances   HENT:  Ear pain, Tinnitus, Nosebleeds, Trouble swallowing, Hearing loss, Sore throat  Cardiovascular:  Chest Pain, Palpitations, Orthopnea, Paroxysmal Nocturnal Dyspnea  Respiratory:  Cough, Wheezing, Shortness of breath, Chest tightness, Apnea  Gastrointestinal:  Nausea, Vomiting, Diarrhea, Constipation, Heartburn, Blood in stool  Genitourinary:  Difficulty or painful urination, Flank pain, Change in frequency, Urgency  Skin:  Color change, Rash, Itching, Wound  Psychiatric:  Hallucinations, Anxiety, Depression, Suicidal ideation  Hematological:  Enlarged glands, Easy bleeding, Easily bruising  Musculoskeletal:  Joint pain, Back pain, Gait problems, Joint swelling, Myalgias  Neurological:  Dizziness, Headaches, Presyncope, Numbness, Seizures, Tremors  Allergy:  Environmental allergies, Food allergies  Endocrine:  Heat Intolerance, Cold Intolerance, Polydipsia, Polyphagia, Polyuria    Lab Results   Component Value Date     02/12/2021    K 4.3 02/12/2021     02/12/2021    CO2 25 02/12/2021    BUN 13 02/12/2021    CREATININE 1.2 02/12/2021    GLUCOSE 128 (H) 02/12/2021    CALCIUM 9.2 02/12/2021    PROT 6.1 02/26/2021    LABALBU 3.6 02/26/2021    BILITOT 0.4 02/26/2021    ALKPHOS 75 02/26/2021    AST 63 (H) 02/26/2021    ALT 99 (H) 02/26/2021    LABGLOM 64 (A) 02/12/2021     Lab Results   Component Value Date    WBC 13.0 (H) 02/26/2021    HGB 16.4 02/26/2021    HCT 50.2 02/26/2021    MCV 94.2 (H) 02/26/2021     02/26/2021     Lab Results   Component Value Date    TSH 3.120 02/12/2021       PHYSICAL EXAM:  Vitals:    04/12/21 0918   BP: 134/76   Pulse: 114   Resp: 14   Temp: 98.7 °F (37.1 °C)   TempSrc: Oral   SpO2: 99%   Weight: 297 lb (134.7 kg)   Height: 5' 11\" (1.803 m)     Body mass index is 41.42 kg/m². VS Reviewed  General Appearance: A&O x 3, No acute distress,well developed and well- nourished  Head: normocephalic and atraumatic  Eyes: pupils equal, round, and reactive to light, extraocular eye movements intact, conjunctivae and eye lids without erythema  Neck: supple and non-tender without mass, no thyromegaly or thyroid nodules, no cervical lymphadenopathy  Pulmonary/Chest: clear to auscultation bilaterally- no wheezes, rales or rhonchi, normal air movement, no respiratory distress or retractions  Cardiovascular: S1 and S2 auscultated w/ RRR. No murmurs, rubs, clicks, or gallops, distal pulses intact.   Abdomen: soft, non-tender, non-distended, bowl sounds physiologic, no rebound or guarding, no masses or hernias noted. Liver and spleen without enlargement. Extremities: no cyanosis, clubbing or edema of the lower extremities  Musculoskeletal: No joint swelling or gross deformity   Neuro:  Alert, 5/5 strength globally and symmetrically  Psych: Affect appropriate. Mood normal. Thought process is normal without evidence of depression or psychosis. Good insight and appropriate interaction. Cognition and memory appear to be intact. Skin: warm and dry, no rash or erythema  Lymph:  No cervical, auricular or supraclavicular lymph nodes palpated    ASSESSMENT & PLAN  Heather Quintana was seen today for follow-up, nicotine dependence and other. Diagnoses and all orders for this visit:    Cigarette nicotine dependence without complication    Anxiety  -     busPIRone (BUSPAR) 7.5 MG tablet; Take 1 tablet by mouth 2 times daily    Herniated lumbar intervertebral disc  -     gabapentin (NEURONTIN) 600 MG tablet; Take 1 tablet by mouth 3 times daily for 180 days. Degenerative lumbar spinal stenosis  -     gabapentin (NEURONTIN) 600 MG tablet; Take 1 tablet by mouth 3 times daily for 180 days. Dependent edema  -     hydroCHLOROthiazide (HYDRODIURIL) 12.5 MG tablet; Take 1 tablet by mouth daily  -     Basic Metabolic Panel; Future    Tachycardia    Essential hypertension  -     hydroCHLOROthiazide (HYDRODIURIL) 12.5 MG tablet; Take 1 tablet by mouth daily  -     Basic Metabolic Panel; Future      - con't Chantix for smoking cessation  - add Buspar for anxiety, discussed benefits, risks and SE medication  - increase Gabapentin for back pain  - add HCTZ for edema and BP  - con't Metoprolol 25 mg  - recheck BMP 1 week  - rec'd compression hose, keep legs elevated etc    DISPOSITION    Return in about 2 months (around 6/12/2021) for smoking cessation, anxiety. Heather Quintana released without restrictions.       PATIENT COUNSELING    Counseling was provided today regarding the following topics: Healthy eating habits, Regular exercise, substance abuse and healthy sleep habits. Marlyn Lima received counseling on the following healthy behaviors: medication adherence and tobacco cessation    Patient given educational materials on: See Attached    I have instructed Marlyn Lima to complete a self tracking handout on none and instructed them to bring it with them to his next appointment. Barriers to learning and self management: none    Discussed use, benefit, and side effects of prescribed medications. Barriers to medication compliance addressed. All patient questions answered. Pt voiced understanding.        Electronically signed by KATHLEEN Espinoza CNP on 4/12/2021 at 9:49 AM

## 2021-04-12 NOTE — PROGRESS NOTES
[address] [facility]  PHYSICAL THERAPY  [] EVALUATION  [x] DAILY NOTE (LAND) [] DAILY NOTE (AQUATIC ) [] PROGRESS NOTE [] DISCHARGE NOTE    [x] OUTPATIENT REHABILITATION CENTER [facility]   [] [place] [address]    [] [facility]   [] Fredy [place]    Date: 2021  Patient Name:  Mick Jose   : 1970  MRN: [id number]  CSN: [id number]    Referring Practitioner Crissy Galvan MD   Diagnosis LOW BACK PAIN     Treatment Diagnosis Lumbago with left radiculopathy, right knee pain   Date of Evaluation 3/9/21    Additional Pertinent History High BP, Bi-polar, Anxiety disorder, Arthritis      Functional Outcome Measure Used Unruly-Damon   Functional Outcome Score  (3/9/21), PN  (21)      Insurance: Primary: Payor: Akosua Mancini /  /  / ,   Secondary:    Authorization Information: Unlimited visits. Aquatics and modalities except massage covered. Telehealth covered   Visit # 10, 10/10 for progress note   Visits Allowed: Unlimited   Recertification Date: 50   Physician Follow-Up: 3-16-21   Physician Orders: 2x/week for 4 weeks: HEP, modalities, paraspinal and abdominal strengthening   History of Present Illness: Has stenosis in his neck and left arm goes numb - is better since injections. SUBJECTIVE: Patient states that his left leg numbness was better after last session. He reports that when he woke up  morning that his numbness was back completely. He states that his back pain is a 7-8/10 today. He notes that the numbness in his LUE has been better and that he only has numbness at his lateral 5th digit. He states that tomorrow he goes back to his Doctor in [facility] for his back and knees. Precautions: Be careful with lifting   Pain: 7-8/10 low back,  LLE> RLE numbness, RUE numbness at lateral part of 5th digit.       X in shaded column indicates activity completed today   Modalities Parameters/  Location   Notes   Traction to low back in supine session after getting up from prone lying due to increased lower back pain. GOALS:  Patient Goal: To be able to get back to some activity with less pain. Short Term Goals:  Time Frame: 4 weeks  1) Patient to report 10-15% decrease in low back pain for ease with sleeping    2) Patient to report able to consistently decrease left leg symptoms to tolerate standing >15 minutes at a time     3)  Patient to start and progress strength program on land without pain increase to provide stability to core with doing activity around the home  4) Patient to report 10-15% relief to right knee pain to allow for less limp with gait to help decrease stress on his back. 5) Patient to demonstrate >25% increase in lumbar range without pain for ease with getting dressed      Long Term Goals:  Time Frame: 12 weeks  1) Patient to be independent with home program to perform all daily activity with minimal to no pain. Patient Education:   []  HEP/Education Completed: Added abdominal bracing with mini marches and PPT.  [employer] Access Code:  []  No new Education completed  [x]  Reviewed Prior HEP      [x]  Patient verbalized and/or demonstrated understanding of education provided. []  Patient unable to verbalize and/or demonstrate understanding of education provided. Will continue education. []  Barriers to learning: None    PLAN:  []  Plan of care initiated. Plan to see patient 2 times per week for 8 weeks to address the treatment planned outlined above.   [x]  Continue with current plan of care  []  Modify plan of care as follows:    []  Hold pending physician visit  []  Discharge    Time In 1559   Time Out 1634   Timed Code Minutes:   33 min   Total Treatment Time:   35 min       Electronically Signed by: Hedy Jefferson

## 2021-04-15 ENCOUNTER — HOSPITAL ENCOUNTER (OUTPATIENT)
Dept: PHYSICAL THERAPY | Age: 51
Setting detail: THERAPIES SERIES
Discharge: HOME | End: 2021-04-15
Payer: MEDICARE

## 2021-04-15 PROCEDURE — 97110 THERAPEUTIC EXERCISES: CPT

## 2021-04-15 PROCEDURE — 97012 MECHANICAL TRACTION THERAPY: CPT

## 2021-04-15 NOTE — DISCHARGE SUMMARY
[address] [facility]  PHYSICAL THERAPY  [] EVALUATION  [] DAILY NOTE (LAND) [] DAILY NOTE (AQUATIC ) [] PROGRESS NOTE [x] DISCHARGE NOTE    [x] OUTPATIENT REHABILITATION CENTER [facility]   [] [place] [address]    [] [facility]   [] [place]    Date: 4/15/2021  Patient Name:  Rylan Jennings   : 1970  MRN: [id number]  CSN: [id number]    Referring Practitioner Xavier Hurtado MD   Diagnosis LOW BACK PAIN     Treatment Diagnosis Lumbago with left radiculopathy, right knee pain   Date of Evaluation 3/9/21    Additional Pertinent History High BP, Bi-polar, Anxiety disorder, Arthritis      Functional Outcome Measure Used Unruly-Damon   Functional Outcome Score  (3/9/21), PN  (21), Discharge  (4-15-21)      Insurance: Primary: Payor: Heath Ospina /  /  / ,   Secondary:    Authorization Information: Unlimited visits. Aquatics and modalities except massage covered. Telehealth covered   Visit # 11 for progress note   Visits Allowed: Unlimited   Recertification Date: 7-4-10   Physician Follow-Up: 3-16-21   Physician Orders: 2x/week for 4 weeks: HEP, modalities, paraspinal and abdominal strengthening   History of Present Illness: Has stenosis in his neck and left arm goes numb - is better since injections. SUBJECTIVE: Patient states saw his family doctor about the blood clot testing he had done and they were glad was not a blood clot but they increased his Lasix due to the continued swelling. States saw pain management doctor Tuesday and they were happy his leg is a little better and they gave him another round of injections. States today his leg is feeling much better but the back is still hurting. States is thinking of trying to get into the chiropractor to see if can manipulate him now if things are more relaxed. States can get dressed a little easier with coat and shoes.      Unruly Damon Disability Scale for Low Back Pain   I stay at home most of the time because of the pain in my back. Yes   I change position frequently to try and make my back comfortable. Yes   I walk more slowly than usual because of the pain in my back. No   Because of the pain in my back, I am not doing any of the jobs that I usually do around the house. No   Because of the pain in my back, I use a handrail to get upstairs. Yes   Because of the pain in my back, I lie down to rest more often. No   Because of the pain in my back, I have to hold onto something to get out of a reclining chair. Yes   Because of the pain in my back, I ask other people to do things for me. Yes   I get dressed more slowly than usual because of the pain in my back. No   I only stand up for short periods of time because of the pain in my back. Yes   Because of the pain in my back, I try not to bend or kneel down. Yes   I find it difficult to get out of a chair because of the pain in my back. No   My back hurts most of the time. Yes   I find it difficult to turn over in bed because of the pain in my back. No   My appetite is not very good because of the pain in my back. No   I have trouble putting on my socks (or stockings) because of the pain in my back. No   I only walk short distances because of the pain in my back. Yes   I sleep less because of the pain in my back. Yes   Because of the pain in my back, I get dressed with help from someone else. No   I sit down most of the day because of the pain in my back. Yes   I avoid heavy jobs around the house because of the pain in my back. Yes   Because of the pain in my back, I am more irritable and bad tempered with people. No   Because of the pain in my back, I go upstairs more slowly than usual. No   I stay in bed most of the time because of the pain in my back. Yes   TOTAL NUMBER OF YES RESPONSES 13/24       Precautions: Be careful with lifting   Pain: 7/10 low back, Numbness/pain lessened in left leg but still has in both legs. 5/10 right knee pain with activity. X in shaded column indicates activity completed today   Modalities Parameters/  Location   Notes   Traction to low back in supine with legs on stool Intermittent pull 165#/90#, 60 sec on/20 sec off, 33\" from bottom of mat to floor, 50% speed with pull x12 min. X                         Manual Therapy Time/Technique   Notes                                 Exercise/Intervention     Notes   Educated on unloading in prone for 5-10 minutes throughout the day with progressive propping as needed while monitoring symptoms           Prone lying prop up on elbows at conclusion of session  x1.5 minutes     Increased pain today at 1.5 minutes. Prone knee flexion        Attempted but patient noted increased pain so held               Supine:            Abdominal bracing 10x 5 sec       PPT  2x 3 sec   Performed 2 reps today then increased pain. Abdominal bracing: hip adduction with ball squeeze  5x  3 sec   Cues for abdominal bracing   Abdominal bracing with mini marches  5x R    Increased pain with LLE so only performed on RLE today. Discussed HEP and educated on what to continue with     X                                            Specific Interventions Next Treatment: No further treatments    Activity/Treatment Tolerance:  []  Patient tolerated treatment well  []  Patient limited by fatigue  [x]  Patient limited by pain   []  Patient limited by medical complications  []  Other:     Assessment: Patient has gotten some relief with therapy. He is getting out of the house more and is having less trouble with dressing. His left leg symptoms are better after traction but they still fluctuate and if they are really bad then he is very limited with what he can do. Patient's back pain has not changed with any therapy. He may be able to get relief with a chiropractor now if his back has loosened up a little with traction.  He did not get any relief with pool therapy and has not been able to tolerate any back exercises due to his continued pain. PT cannot warrant any further therapy at this time since not getting consistent relief from traction and not able to tolerate anything else. Patient has HEP to continue with as tolerated. GOALS:  Patient Goal: To be able to get back to some activity with less pain. Short Term Goals:  Time Frame: 4 weeks  1) Patient to report 10-15% decrease in low back pain for ease with sleeping  [] Goal Met [x] Goal Not Met [] Continue Goal [x] Discontinue Goal  [] Revise Goal  Goal Assessment: No change in back pain since starting therapy. 2) Patient to report able to consistently decrease left leg symptoms to tolerate standing >15 minutes at a time  [] Goal Met [x] Goal Not Met [] Continue Goal [x] Discontinue Goal  [] Revise Goal  Goal Assessment: Patient does get relief from the traction but is not consistently. If numbness is better in the leg then he can stand longer but if having increased symptoms then limited to 5 min or less due to back pain and leg symptoms. 3)  Patient to start and progress strength program on land without pain increase to provide stability to core with doing activity around the home  [] Goal Met [x] Goal Not Met [] Continue Goal [x] Discontinue Goal  [] Revise Goal  Goal Assessment: Patient has not been able to tolerate exercises - cause too much pain  4) Patient to report 10-15% relief to right knee pain to allow for less limp with gait to help decrease stress on his back. [x] Goal Met [] Goal Not Met [] Continue Goal [x] Discontinue Goal  [] Revise Goal  Goal Assessment: Patient reports is 20% better and does limp less on it. States is better since the swelling has gone down.   5) Patient to demonstrate >25% increase in lumbar range without pain for ease with getting dressed  [] Goal Met [x] Goal Not Met [] Continue Goal [x] Discontinue Goal  [] Revise Goal  Goal Assessment: Patient limited due to pain      Long Term Goals:  Time Frame: 12 weeks  1) Patient to be independent with home program to perform all daily activity with minimal to no pain. [] Goal Met [x] Goal Not Met [] Continue Goal [x] Discontinue Goal  [] Revise Goal  Goal Assessment: Patient able to be independent with HEP but cannot do well due to pain. Still limited by pain. Patient Education:   [x]  HEP/Education Completed: Continue with HEP as able. Follow up with chiropractor.  "[employer] Access Code:  []  No new Education completed  [x]  Reviewed Prior HEP      [x]  Patient verbalized and/or demonstrated understanding of education provided. []  Patient unable to verbalize and/or demonstrate understanding of education provided. Will continue education. []  Barriers to learning: None    PLAN:  []  Plan of care initiated. Plan to see patient 2 times per week for 8 weeks to address the treatment planned outlined above.   []  Continue with current plan of care  []  Modify plan of care as follows:    []  Hold pending physician visit  [x]  Discharge    Time In 1307   Time Out 1347   Timed Code Minutes:   40 min   Total Treatment Time:   40 min       Electronically Signed by: [place] [id number]

## 2021-06-11 ENCOUNTER — OFFICE VISIT (OUTPATIENT)
Dept: FAMILY MEDICINE CLINIC | Age: 51
End: 2021-06-11
Payer: MEDICARE

## 2021-06-11 VITALS
TEMPERATURE: 98.3 F | RESPIRATION RATE: 18 BRPM | DIASTOLIC BLOOD PRESSURE: 84 MMHG | HEIGHT: 71 IN | HEART RATE: 92 BPM | WEIGHT: 311 LBS | SYSTOLIC BLOOD PRESSURE: 138 MMHG | BODY MASS INDEX: 43.54 KG/M2

## 2021-06-11 DIAGNOSIS — M48.061 DEGENERATIVE LUMBAR SPINAL STENOSIS: ICD-10-CM

## 2021-06-11 DIAGNOSIS — R00.0 TACHYCARDIA: ICD-10-CM

## 2021-06-11 DIAGNOSIS — F17.210 CIGARETTE NICOTINE DEPENDENCE WITHOUT COMPLICATION: ICD-10-CM

## 2021-06-11 DIAGNOSIS — M51.26 HERNIATED LUMBAR INTERVERTEBRAL DISC: Primary | ICD-10-CM

## 2021-06-11 DIAGNOSIS — I10 ESSENTIAL HYPERTENSION: ICD-10-CM

## 2021-06-11 DIAGNOSIS — F41.9 ANXIETY: ICD-10-CM

## 2021-06-11 PROCEDURE — 1036F TOBACCO NON-USER: CPT | Performed by: NURSE PRACTITIONER

## 2021-06-11 PROCEDURE — 3017F COLORECTAL CA SCREEN DOC REV: CPT | Performed by: NURSE PRACTITIONER

## 2021-06-11 PROCEDURE — 99214 OFFICE O/P EST MOD 30 MIN: CPT | Performed by: NURSE PRACTITIONER

## 2021-06-11 PROCEDURE — G8427 DOCREV CUR MEDS BY ELIG CLIN: HCPCS | Performed by: NURSE PRACTITIONER

## 2021-06-11 PROCEDURE — G8417 CALC BMI ABV UP PARAM F/U: HCPCS | Performed by: NURSE PRACTITIONER

## 2021-06-11 PROCEDURE — 99406 BEHAV CHNG SMOKING 3-10 MIN: CPT | Performed by: NURSE PRACTITIONER

## 2021-06-11 RX ORDER — BACLOFEN 10 MG/1
10 TABLET ORAL 3 TIMES DAILY
Qty: 60 TABLET | Refills: 1 | Status: SHIPPED | OUTPATIENT
Start: 2021-06-11 | End: 2022-01-12

## 2021-06-11 RX ORDER — GABAPENTIN 800 MG/1
800 TABLET ORAL 4 TIMES DAILY
Qty: 360 TABLET | Refills: 1 | Status: SHIPPED | OUTPATIENT
Start: 2021-06-11 | End: 2021-12-27

## 2021-06-11 RX ORDER — METOPROLOL SUCCINATE 50 MG/1
50 TABLET, EXTENDED RELEASE ORAL DAILY
Qty: 90 TABLET | Refills: 1 | Status: SHIPPED | OUTPATIENT
Start: 2021-06-11 | End: 2021-11-22

## 2021-06-11 RX ORDER — BUSPIRONE HYDROCHLORIDE 15 MG/1
15 TABLET ORAL 3 TIMES DAILY
Qty: 270 TABLET | Refills: 1 | Status: SHIPPED | OUTPATIENT
Start: 2021-06-11 | End: 2021-11-22

## 2021-06-11 NOTE — PROGRESS NOTES
[facility] Medicine at / [facility] [address] [facility] ISABEL II.Ashley BREWER. [address]  Chief Complaint   Patient presents with    Nicotine Dependence     going well with stopping (couple cigs per month)    Medication Adjustment     discuss increasing oneyda (numbness in foot and cristina today)        History obtained from chart review and the patient. SUBJECTIVE:  Trish Pineda is a 49 y.o. male that presents today for follow up HTN, back pain    He is following with Dr Dionna Palacios (Pain Management in [place]) for chronic back pain. He has several bulging discs in his lumbar spine as well as lumbar and cervical spinal stenosis. He is getting injections in his back, and he currently taking Narcan, Oxycodone and Baclofen. He is taking the Gabapentin and he would like to increase the dose some to help with the numbness in his left leg. He does get periodic back spasms, especially when he stands for a long time. Would like refill    Nicotine Dependence  He has been taking the Chantix, and he is still smoking. He is smoking maybe 6-8 cigarettes/month. Admits that he has started smoking mostly because of anxiety. Anxiety  Is taking the Buspar and he does feel like the anxiety is better. It's not as bad, but could see some improvement. HTN  He is taking the metoprolol. He notes that when he goes to other doctors office his HR and BP will still be high. He does have home BP monitoring cuff.     Age/Gender Health Maintenance    Lipid -   No results found for: CHOL  No results found for: TRIG  Lab Results   Component Value Date    HDL 45 02/12/2021     Lab Results   Component Value Date    LDLCALC 106 02/12/2021     DM Screen -   Lab Results   Component Value Date    LABA1C 6.4 02/26/2021     Colon Cancer Screening - 51  Lung Cancer Screening (Age 54 to [de-identified] with 30 pack year hx, current smoker or quit within past 15 years) - 55    Tetanus - needs  Influenza Vaccine - yearly  Pneumonia Vaccine - 66  Zostavax - 51     PSA testing discussion - 55  AAA Screening - 66    Falls screening - n/a    Current Outpatient Medications   Medication Sig Dispense Refill    busPIRone (BUSPAR) 15 MG tablet Take 15 mg by mouth 3 times daily 270 tablet 1    metoprolol succinate (TOPROL XL) 50 MG extended release tablet Take 1 tablet by mouth daily 90 tablet 1    gabapentin (NEURONTIN) 800 MG tablet Take 1 tablet by mouth 4 times daily for 180 days. 360 tablet 1    baclofen (LIORESAL) 10 MG tablet Take 1 tablet by mouth 3 times daily 60 tablet 1    oxyCODONE HCl (OXY-IR) 10 MG immediate release tablet take 1 tablet by mouth three times a day if needed for pain      hydroCHLOROthiazide (HYDRODIURIL) 12.5 MG tablet Take 1 tablet by mouth daily 30 tablet 3    varenicline (CHANTIX CONTINUING MONTH EVENS) 1 MG tablet Take 1 tablet by mouth 2 times daily 60 tablet 3    Handicap Placard MISC by Does not apply route Expires 2/17/2026 1 each 0    lamoTRIgine (LAMICTAL) 25 MG tablet       NARCAN 4 MG/0.1ML LIQD nasal spray       oxyCODONE (ROXICODONE) 5 MG immediate release tablet 10 mg.       QUEtiapine (SEROQUEL) 100 MG tablet Take 100 mg by mouth nightly      vitamin E 400 UNIT capsule Take 1 capsule by mouth 2 times daily 60 capsule 3    Elastic Bandages & Supports (KNEE BRACE/HINGED/REGULAR) MISC 1 Device by Does not apply route daily 1 each 0     No current facility-administered medications for this visit. Orders Placed This Encounter   Medications    busPIRone (BUSPAR) 15 MG tablet     Sig: Take 15 mg by mouth 3 times daily     Dispense:  270 tablet     Refill:  1    metoprolol succinate (TOPROL XL) 50 MG extended release tablet     Sig: Take 1 tablet by mouth daily     Dispense:  90 tablet     Refill:  1    gabapentin (NEURONTIN) 800 MG tablet     Sig: Take 1 tablet by mouth 4 times daily for 180 days.      Dispense:  360 tablet     Refill:  1    baclofen (LIORESAL) 10 MG tablet     Sig: Take 1 tablet by mouth 3 times daily     Dispense:  60 tablet     Refill:  1         All medications reviewed and reconciled, including OTC and herbal medications. Updated list given to patient.        Patient Active Problem List   Diagnosis    Herniated lumbar intervertebral disc    Primary osteoarthritis of right knee    Cervical spinal stenosis    Degenerative lumbar spinal stenosis    Bipolar affective disorder, currently depressed, mild (HCC)    Cigarette nicotine dependence without complication    Tachycardia    Essential hypertension    Anxiety    Dependent edema       Past Medical History:   Diagnosis Date    Bipolar disorder with depression ([address]     Cervical spinal stenosis     Cigarette nicotine dependence without complication 4308    Degenerative lumbar spinal stenosis 2021    Essential hypertension     Herniated lumbar intervertebral disc 2021    Primary osteoarthritis of right knee 2021    Tachycardia        Past Surgical History:   Procedure Laterality Date    KNEE SURGERY         Allergies   Allergen Reactions    Tylenol [Acetaminophen]     Celebrex [Celecoxib] Rash       Social History     Socioeconomic History    Marital status: Single     Spouse name: Not on file    Number of children: Not on file    Years of education: Not on file    Highest education level: Not on file   Occupational History    Not on file   Tobacco Use    Smoking status: Former Smoker     Packs/day: 0.25     Types: Cigarettes     Start date:      Quit date: 2021     Years since quittin.3    Smokeless tobacco: Never Used   Substance and Sexual Activity    Alcohol use: Never    Drug use: Never    Sexual activity: Not on file   Other Topics Concern    Not on file   Social History Narrative    Not on file     Social Determinants of Health     Financial Resource Strain: Medium Risk    Difficulty of Paying Living Expenses: Somewhat hard   Food Insecurity: Food Insecurity Present    Worried About Running Out of Food in the Last Year: Often true    Lucy of Food in the Last Year: Often true   Transportation Needs: No Transportation Needs    Lack of Transportation (Medical): No    Lack of Transportation (Non-Medical): No   Physical Activity:     Days of Exercise per Week:     Minutes of Exercise per Session:    Stress:     Feeling of Stress :    Social Connections:     Frequency of Communication with Friends and Family:     Frequency of Social Gatherings with Friends and Family:     Attends [employer] Services:     Active Member of Clubs or Organizations:     Attends Club or Organization Meetings:     Marital Status:    Intimate Partner Violence:     Fear of Current or Ex-Partner:     Emotionally Abused:     Physically Abused:     Sexually Abused:        Family History   Problem Relation Age of Onset    Colon Cancer Neg Hx     Esophageal Cancer Neg Hx     Liver Cancer Neg Hx     Rectal Cancer Neg Hx     Stomach Cancer Neg Hx          I have reviewed the patient's past medical history, past surgical history, allergies, medications, social and family history and I have made updates where appropriate.       Review of Systems  Positive responses are highlighted in bold    Constitutional:  Fever, Chills, Night Sweats, Fatigue, Unexpected changes in weight  Eyes:  Eye discharge, Eye pain, Eye redness, Visual disturbances   HENT:  Ear pain, Tinnitus, Nosebleeds, Trouble swallowing, Hearing loss, Sore throat  Cardiovascular:  Chest Pain, Palpitations, Orthopnea, Paroxysmal Nocturnal Dyspnea  Respiratory:  Cough, Wheezing, Shortness of breath, Chest tightness, Apnea  Gastrointestinal:  Nausea, Vomiting, Diarrhea, Constipation, Heartburn, Blood in stool  Genitourinary:  Difficulty or painful urination, Flank pain, Change in frequency, Urgency  Skin:  Color change, Rash, Itching, Wound  Psychiatric:  Hallucinations, Anxiety, Depression, Suicidal ideation  Hematological:  Enlarged glands, Easy bleeding, Easily bruising  Musculoskeletal:  Joint pain, Back pain, Gait problems, Joint swelling, Myalgias  Neurological:  Dizziness, Headaches, Presyncope, Numbness, Seizures, Tremors  Allergy:  Environmental allergies, Food allergies  Endocrine:  Heat Intolerance, Cold Intolerance, Polydipsia, Polyphagia, Polyuria    Lab Results   Component Value Date     02/12/2021    K 4.3 02/12/2021     02/12/2021    CO2 25 02/12/2021    BUN 13 02/12/2021    CREATININE 1.2 02/12/2021    GLUCOSE 128 (H) 02/12/2021    CALCIUM 9.2 02/12/2021    PROT 6.1 02/26/2021    LABALBU 3.6 02/26/2021    BILITOT 0.4 02/26/2021    ALKPHOS 75 02/26/2021    AST 63 (H) 02/26/2021    ALT 99 (H) 02/26/2021    LABGLOM 64 (A) 02/12/2021     Lab Results   Component Value Date    WBC 13.0 (H) 02/26/2021    HGB 16.4 02/26/2021    HCT 50.2 02/26/2021    MCV 94.2 (H) 02/26/2021     02/26/2021     Lab Results   Component Value Date    TSH 3.120 02/12/2021       PHYSICAL EXAM:  Vitals:    06/11/21 0945   BP: 138/84   Pulse: 92   Resp: 18   Temp: 98.3 °F (36.8 °C)   Weight: (!) 311 lb (141.1 kg)   Height: 5' 11\" (1.803 m)     Body mass index is 43.38 kg/m². VS Reviewed  General Appearance: A&O x 3, No acute distress,well developed and well- nourished  Head: normocephalic and atraumatic  Eyes: pupils equal, round, and reactive to light, extraocular eye movements intact, conjunctivae and eye lids without erythema  Neck: supple and non-tender without mass, no thyromegaly or thyroid nodules, no cervical lymphadenopathy  Pulmonary/Chest: clear to auscultation bilaterally- no wheezes, rales or rhonchi, normal air movement, no respiratory distress or retractions  Cardiovascular: S1 and S2 auscultated w/ RRR. No murmurs, rubs, clicks, or gallops, distal pulses intact. Abdomen: soft, non-tender, non-distended, bowl sounds physiologic,  no rebound or guarding, no masses or hernias noted. Liver and spleen without enlargement.    Extremities: no cyanosis, clubbing or edema of the lower extremities  Musculoskeletal: No joint swelling or gross deformity   Neuro:  Alert, 5/5 strength globally and symmetrically  Psych: Affect appropriate. Mood normal. Thought process is normal without evidence of depression or psychosis. Good insight and appropriate interaction. Cognition and memory appear to be intact. Skin: warm and dry, no rash or erythema  Lymph:  No cervical, auricular or supraclavicular lymph nodes palpated    ASSESSMENT & PLAN  Walker Dickerson was seen today for nicotine dependence and medication adjustment. Diagnoses and all orders for this visit:    Herniated lumbar intervertebral disc  -     gabapentin (NEURONTIN) 800 MG tablet; Take 1 tablet by mouth 4 times daily for 180 days. -     baclofen (LIORESAL) 10 MG tablet; Take 1 tablet by mouth 3 times daily    Anxiety  -     busPIRone (BUSPAR) 15 MG tablet; Take 15 mg by mouth 3 times daily    Tachycardia  -     metoprolol succinate (TOPROL XL) 50 MG extended release tablet; Take 1 tablet by mouth daily    Essential hypertension  -     metoprolol succinate (TOPROL XL) 50 MG extended release tablet; Take 1 tablet by mouth daily    Degenerative lumbar spinal stenosis  -     gabapentin (NEURONTIN) 800 MG tablet; Take 1 tablet by mouth 4 times daily for 180 days. -     baclofen (LIORESAL) 10 MG tablet; Take 1 tablet by mouth 3 times daily    Cigarette nicotine dependence without complication  -     OH TOBACCO USE CESSATION INTERMEDIATE 3-10 MINUTES      - increase Gabapentin to 800 mg qid and add Baclofen  - con't working on complete smoking cessation  - will give him 2 more months of Chantix and if he hasn't quit by then, will need to stop either way  - increase Metoprolol, con't home monitoring of HR/BP  - increase Buspar for anxiety    DISPOSITION    Return in about 2 months (around 8/11/2021) for chronic conditions. Walker Dickerson released without restrictions.       PATIENT COUNSELING    Counseling was provided today regarding the following topics: Healthy eating habits, Regular exercise, substance abuse and healthy sleep habits. Elaine Augustin received counseling on the following healthy behaviors: medication adherence and tobacco cessation    Patient given educational materials on: See Attached    I have instructed Elaine Augustin to complete a self tracking handout on none and instructed them to bring it with them to his next appointment. Barriers to learning and self management: none    Discussed use, benefit, and side effects of prescribed medications. Barriers to medication compliance addressed. All patient questions answered. Pt voiced understanding.        Electronically signed by KATHLEEN Berumen CNP on 6/11/2021 at 10:18 AM

## 2021-08-04 DIAGNOSIS — I10 ESSENTIAL HYPERTENSION: ICD-10-CM

## 2021-08-04 DIAGNOSIS — R60.9 DEPENDENT EDEMA: ICD-10-CM

## 2021-08-04 RX ORDER — HYDROCHLOROTHIAZIDE 12.5 MG/1
TABLET ORAL
Qty: 90 TABLET | Refills: 3 | Status: SHIPPED | OUTPATIENT
Start: 2021-08-04 | End: 2022-06-29

## 2021-09-01 ENCOUNTER — NURSE ONLY (OUTPATIENT)
Dept: LAB | Age: 51
End: 2021-09-01

## 2021-09-01 ENCOUNTER — TELEPHONE (OUTPATIENT)
Dept: FAMILY MEDICINE CLINIC | Age: 51
End: 2021-09-01

## 2021-09-01 DIAGNOSIS — I10 ESSENTIAL HYPERTENSION: ICD-10-CM

## 2021-09-01 DIAGNOSIS — R79.89 ABNORMAL LFTS: ICD-10-CM

## 2021-09-01 DIAGNOSIS — K76.0 FATTY LIVER: ICD-10-CM

## 2021-09-01 DIAGNOSIS — D72.829 LEUKOCYTOSIS, UNSPECIFIED TYPE: ICD-10-CM

## 2021-09-01 DIAGNOSIS — R60.9 DEPENDENT EDEMA: ICD-10-CM

## 2021-09-01 DIAGNOSIS — K21.9 GASTROESOPHAGEAL REFLUX DISEASE WITHOUT ESOPHAGITIS: ICD-10-CM

## 2021-09-01 LAB
ALBUMIN SERPL-MCNC: 3.8 G/DL (ref 3.5–5.1)
ALP BLD-CCNC: 72 U/L (ref 38–126)
ALT SERPL-CCNC: 62 U/L (ref 11–66)
ANION GAP SERPL CALCULATED.3IONS-SCNC: 11 MEQ/L (ref 8–16)
AST SERPL-CCNC: 55 U/L (ref 5–40)
BASOPHILS # BLD: 0.9 %
BASOPHILS ABSOLUTE: 0.1 THOU/MM3 (ref 0–0.1)
BILIRUB SERPL-MCNC: 0.3 MG/DL (ref 0.3–1.2)
BILIRUBIN DIRECT: < 0.2 MG/DL (ref 0–0.3)
BUN BLDV-MCNC: 6 MG/DL (ref 7–22)
CALCIUM SERPL-MCNC: 9.1 MG/DL (ref 8.5–10.5)
CHLORIDE BLD-SCNC: 103 MEQ/L (ref 98–111)
CO2: 26 MEQ/L (ref 23–33)
CREAT SERPL-MCNC: 1 MG/DL (ref 0.4–1.2)
EOSINOPHIL # BLD: 2.2 %
EOSINOPHILS ABSOLUTE: 0.2 THOU/MM3 (ref 0–0.4)
ERYTHROCYTE [DISTWIDTH] IN BLOOD BY AUTOMATED COUNT: 12.7 % (ref 11.5–14.5)
ERYTHROCYTE [DISTWIDTH] IN BLOOD BY AUTOMATED COUNT: 43.2 FL (ref 35–45)
GFR SERPL CREATININE-BSD FRML MDRD: 79 ML/MIN/1.73M2
GLUCOSE BLD-MCNC: 139 MG/DL (ref 70–108)
HCT VFR BLD CALC: 51.4 % (ref 42–52)
HEMOGLOBIN: 16.9 GM/DL (ref 14–18)
IMMATURE GRANS (ABS): 0.05 THOU/MM3 (ref 0–0.07)
IMMATURE GRANULOCYTES: 0.6 %
LYMPHOCYTES # BLD: 30.2 %
LYMPHOCYTES ABSOLUTE: 2.7 THOU/MM3 (ref 1–4.8)
MCH RBC QN AUTO: 30.5 PG (ref 26–33)
MCHC RBC AUTO-ENTMCNC: 32.9 GM/DL (ref 32.2–35.5)
MCV RBC AUTO: 92.6 FL (ref 80–94)
MONOCYTES # BLD: 7.4 %
MONOCYTES ABSOLUTE: 0.7 THOU/MM3 (ref 0.4–1.3)
NUCLEATED RED BLOOD CELLS: 0 /100 WBC
PLATELET # BLD: 223 THOU/MM3 (ref 130–400)
PMV BLD AUTO: 11.5 FL (ref 9.4–12.4)
POTASSIUM SERPL-SCNC: 3.8 MEQ/L (ref 3.5–5.2)
RBC # BLD: 5.55 MILL/MM3 (ref 4.7–6.1)
SEG NEUTROPHILS: 58.7 %
SEGMENTED NEUTROPHILS ABSOLUTE COUNT: 5.2 THOU/MM3 (ref 1.8–7.7)
SODIUM BLD-SCNC: 140 MEQ/L (ref 135–145)
TOTAL PROTEIN: 6.3 G/DL (ref 6.1–8)
WBC # BLD: 8.9 THOU/MM3 (ref 4.8–10.8)

## 2021-09-02 ENCOUNTER — OFFICE VISIT (OUTPATIENT)
Dept: FAMILY MEDICINE CLINIC | Age: 51
End: 2021-09-02
Payer: MEDICARE

## 2021-09-02 VITALS
BODY MASS INDEX: 40.88 KG/M2 | HEART RATE: 80 BPM | WEIGHT: 292 LBS | DIASTOLIC BLOOD PRESSURE: 82 MMHG | SYSTOLIC BLOOD PRESSURE: 132 MMHG | HEIGHT: 71 IN | RESPIRATION RATE: 20 BRPM

## 2021-09-02 DIAGNOSIS — I73.9 PVD (PERIPHERAL VASCULAR DISEASE) (HCC): ICD-10-CM

## 2021-09-02 DIAGNOSIS — L03.116 CELLULITIS OF LEFT LOWER EXTREMITY: Primary | ICD-10-CM

## 2021-09-02 PROCEDURE — G8428 CUR MEDS NOT DOCUMENT: HCPCS | Performed by: NURSE PRACTITIONER

## 2021-09-02 PROCEDURE — G8417 CALC BMI ABV UP PARAM F/U: HCPCS | Performed by: NURSE PRACTITIONER

## 2021-09-02 PROCEDURE — 3017F COLORECTAL CA SCREEN DOC REV: CPT | Performed by: NURSE PRACTITIONER

## 2021-09-02 PROCEDURE — 1036F TOBACCO NON-USER: CPT | Performed by: NURSE PRACTITIONER

## 2021-09-02 PROCEDURE — 99213 OFFICE O/P EST LOW 20 MIN: CPT | Performed by: NURSE PRACTITIONER

## 2021-09-02 RX ORDER — CEPHALEXIN 500 MG/1
500 CAPSULE ORAL 3 TIMES DAILY
Qty: 30 CAPSULE | Refills: 0 | Status: SHIPPED | OUTPATIENT
Start: 2021-09-02 | End: 2021-09-12

## 2021-09-02 NOTE — PROGRESS NOTES
[facility] Medicine at / [facility] [address] [facility]  [address] [facility]  Chief Complaint   Patient presents with    Other     LEFT FOOT SWELLING AND REDNESS       History obtained from chart review and the patient. SUBJECTIVE:  Alberto Hinton is a 49 y.o. male that presents today for cellulitis    He was on his feet at the [address] Thursday Friday and Saturday. Saturday his left foot started to swell and turned red and then turned purple. He rested and elevated his foot and the swelling did improve some. He was having some pain from his calf on down. He also soaked it in hot water. He also had his feet in compression socks for a couple days and this did help as well. He is mostly now concerned about an area of redness on his inner left calf. The area does itch sometimes. Is not painful now. Rash    HPI:    Length of time Sx have been present - since Saturday  Rash has gotten unchanged since initially starting  Affected areas - LLE  Inciting events or exposures prior to rash starting? No  Pruritic? Yes  Erythematous? Yes  Weeping or drainage? No  History of Urticaria? No  Fever? No  Painful?   Yes    Review of Systems - General ROS: negative for - chills, fever or night sweats  Respiratory ROS: negative for - shortness of breath, stridor or wheezing      Age/Gender Health Maintenance    Lipid -   No results found for: CHOL  No results found for: TRIG  Lab Results   Component Value Date    HDL 45 02/12/2021     Lab Results   Component Value Date    LDLCALC 106 02/12/2021     DM Screen -   Lab Results   Component Value Date    LABA1C 6.4 02/26/2021     Colon Cancer Screening - 49  Lung Cancer Screening (Age 54 to [de-identified] with 30 pack year hx, current smoker or quit within past 15 years) - 55    Tetanus - needs  Influenza Vaccine - yearly  Pneumonia Vaccine - 66  Zostavax - 51     PSA testing discussion - 56  AAA Screening - 66    Falls screening - n/a    Current Outpatient Medications   Medication Sig Dispense Refill  cephALEXin (KEFLEX) 500 MG capsule Take 1 capsule by mouth 3 times daily for 10 days 30 capsule 0    hydroCHLOROthiazide (HYDRODIURIL) 12.5 MG tablet take 1 tablet by mouth once daily 90 tablet 3    busPIRone (BUSPAR) 15 MG tablet Take 15 mg by mouth 3 times daily 270 tablet 1    metoprolol succinate (TOPROL XL) 50 MG extended release tablet Take 1 tablet by mouth daily 90 tablet 1    gabapentin (NEURONTIN) 800 MG tablet Take 1 tablet by mouth 4 times daily for 180 days. 360 tablet 1    baclofen (LIORESAL) 10 MG tablet Take 1 tablet by mouth 3 times daily 60 tablet 1    oxyCODONE HCl (OXY-IR) 10 MG immediate release tablet take 1 tablet by mouth three times a day if needed for pain      vitamin E 400 UNIT capsule Take 1 capsule by mouth 2 times daily 60 capsule 3    varenicline (CHANTIX CONTINUING MONTH EVENS) 1 MG tablet Take 1 tablet by mouth 2 times daily 60 tablet 3    Handicap Placard MISC by Does not apply route Expires 2/17/2026 1 each 0    Elastic Bandages & Supports (KNEE BRACE/HINGED/REGULAR) MISC 1 Device by Does not apply route daily 1 each 0    lamoTRIgine (LAMICTAL) 25 MG tablet       NARCAN 4 MG/0.1ML LIQD nasal spray       oxyCODONE (ROXICODONE) 5 MG immediate release tablet 10 mg.       QUEtiapine (SEROQUEL) 100 MG tablet Take 100 mg by mouth nightly       No current facility-administered medications for this visit. Orders Placed This Encounter   Medications    cephALEXin (KEFLEX) 500 MG capsule     Sig: Take 1 capsule by mouth 3 times daily for 10 days     Dispense:  30 capsule     Refill:  0         All medications reviewed and reconciled, including OTC and herbal medications. Updated list given to patient.        Patient Active Problem List   Diagnosis    Herniated lumbar intervertebral disc    Primary osteoarthritis of right knee    Cervical spinal stenosis    Degenerative lumbar spinal stenosis    Bipolar affective disorder, currently depressed, mild (HCC)    Cigarette nicotine dependence without complication    Tachycardia    Essential hypertension    Anxiety    Dependent edema       Past Medical History:   Diagnosis Date    Bipolar disorder with depression ([facility] [address]     Cervical spinal stenosis     Cigarette nicotine dependence without complication     Degenerative lumbar spinal stenosis 2021    Essential hypertension     Herniated lumbar intervertebral disc 2021    Primary osteoarthritis of right knee 2021    Tachycardia        Past Surgical History:   Procedure Laterality Date    KNEE SURGERY         Allergies   Allergen Reactions    Tylenol [Acetaminophen]     Celebrex [Celecoxib] Rash       Social History     Socioeconomic History    Marital status: Single     Spouse name: Not on file    Number of children: Not on file    Years of education: Not on file    Highest education level: Not on file   Occupational History    Not on file   Tobacco Use    Smoking status: Former Smoker     Packs/day: 0.25     Types: Cigarettes     Start date:      Quit date: 2021     Years since quittin.5    Smokeless tobacco: Never Used   Substance and Sexual Activity    Alcohol use: Never    Drug use: Never    Sexual activity: Not on file   Other Topics Concern    Not on file   Social History Narrative    Not on file     Social Determinants of Health     Financial Resource Strain: Medium Risk    Difficulty of Paying Living Expenses: Somewhat hard   Food Insecurity: Food Insecurity Present    Worried About Running Out of Food in the Last Year: Often true    Lucy of Food in the Last Year: Often true   Transportation Needs: No Transportation Needs    Lack of Transportation (Medical): No    Lack of Transportation (Non-Medical):  No   Physical Activity:     Days of Exercise per Week:     Minutes of Exercise per Session:    Stress:     Feeling of Stress :    Social Connections:     Frequency of Communication with Friends and Family:     Frequency of Social Gatherings with Friends and Family:     Attends Voodoo Services:     Active Member of Clubs or Organizations:     Attends Club or Organization Meetings:     Marital Status:    Intimate Partner Violence:     Fear of Current or Ex-Partner:     Emotionally Abused:     Physically Abused:     Sexually Abused:        Family History   Problem Relation Age of Onset    Colon Cancer Neg Hx     Esophageal Cancer Neg Hx     Liver Cancer Neg Hx     Rectal Cancer Neg Hx     Stomach Cancer Neg Hx          I have reviewed the patient's past medical history, past surgical history, allergies, medications, social and family history and I have made updates where appropriate.       Review of Systems  Positive responses are highlighted in bold    Constitutional:  Fever, Chills, Night Sweats, Fatigue, Unexpected changes in weight  Eyes:  Eye discharge, Eye pain, Eye redness, Visual disturbances   HENT:  Ear pain, Tinnitus, Nosebleeds, Trouble swallowing, Hearing loss, Sore throat  Cardiovascular:  Chest Pain, Palpitations, Orthopnea, Paroxysmal Nocturnal Dyspnea  Respiratory:  Cough, Wheezing, Shortness of breath, Chest tightness, Apnea  Gastrointestinal:  Nausea, Vomiting, Diarrhea, Constipation, Heartburn, Blood in stool  Genitourinary:  Difficulty or painful urination, Flank pain, Change in frequency, Urgency  Skin:  Color change, Rash, Itching, Wound  Psychiatric:  Hallucinations, Anxiety, Depression, Suicidal ideation  Hematological:  Enlarged glands, Easy bleeding, Easily bruising  Musculoskeletal:  Joint pain, Back pain, Gait problems, Joint swelling, Myalgias  Neurological:  Dizziness, Headaches, Presyncope, Numbness, Seizures, Tremors  Allergy:  Environmental allergies, Food allergies  Endocrine:  Heat Intolerance, Cold Intolerance, Polydipsia, Polyphagia, Polyuria    Lab Results   Component Value Date     09/01/2021    K 3.8 09/01/2021     09/01/2021    CO2 26 09/01/2021    BUN 6 (L) 09/01/2021    CREATININE 1.0 09/01/2021    GLUCOSE 139 (H) 09/01/2021    CALCIUM 9.1 09/01/2021    PROT 6.3 09/01/2021    LABALBU 3.8 09/01/2021    BILITOT 0.3 09/01/2021    ALKPHOS 72 09/01/2021    AST 55 (H) 09/01/2021    ALT 62 09/01/2021    LABGLOM 79 (A) 09/01/2021     Lab Results   Component Value Date    WBC 8.9 09/01/2021    HGB 16.9 09/01/2021    HCT 51.4 09/01/2021    MCV 92.6 09/01/2021     09/01/2021     Lab Results   Component Value Date    TSH 3.120 02/12/2021       PHYSICAL EXAM:  Vitals:    09/02/21 1056   BP: 132/82   Pulse: 80   Resp: 20   Weight: 292 lb (132.5 kg)   Height: 5' 11\" (1.803 m)     Body mass index is 40.73 kg/m². VS Reviewed  General Appearance: A&O x 3, No acute distress,well developed and well- nourished  Head: normocephalic and atraumatic  Eyes: pupils equal, round, and reactive to light, extraocular eye movements intact, conjunctivae and eye lids without erythema  Neck: supple and non-tender without mass, no thyromegaly or thyroid nodules, no cervical lymphadenopathy  Pulmonary/Chest: clear to auscultation bilaterally- no wheezes, rales or rhonchi, normal air movement, no respiratory distress or retractions  Cardiovascular: S1 and S2 auscultated w/ RRR. No murmurs, rubs, clicks, or gallops, distal pulses intact. Abdomen: soft, non-tender, non-distended, bowl sounds physiologic,  no rebound or guarding, no masses or hernias noted. Liver and spleen without enlargement. Extremities: bilateral PT pulses 2+ right DP pulse 1+, dopplered left DP pulse and it was strong, area of redness left inner calf down to ankle, no warmth. Left foot dusky appearance, skin is warm and dry, cap refill mildly reduced LLE  Musculoskeletal: No joint swelling or gross deformity   Neuro:  Alert, 5/5 strength globally and symmetrically  Psych: Affect appropriate. Mood normal. Thought process is normal without evidence of depression or psychosis.  Good insight and appropriate interaction. Cognition and memory appear to be intact. Skin: warm and dry, light brown pigmentation noted bilat lower extremities  Lymph:  No cervical, auricular or supraclavicular lymph nodes palpated    ASSESSMENT & PLAN  Hamilton Goode was seen today for other. Diagnoses and all orders for this visit:    Cellulitis of left lower extremity  -     cephALEXin (KEFLEX) 500 MG capsule; Take 1 capsule by mouth 3 times daily for 10 days    PVD (peripheral vascular disease) ([facility] [address]  -     VL HAILEE BILATERAL LIMITED 1-2 LEVELS; Future      - start Keflex  - he definitely does have venous insufficiency in both lower extremities. However, I am concerned about some arterial insufficiency as well in LLE. Had to doppler left DP pulse as I was unable to palpate it. - will obtain bilat ankle HAILEE, rest and elevate    DISPOSITION    Return in about 2 weeks (around 9/16/2021) for AWV, PVD, DM. Hamilton Goode released without restrictions. PATIENT COUNSELING    Counseling was provided today regarding the following topics: Healthy eating habits, Regular exercise, substance abuse and healthy sleep habits. Hamilton Goode received counseling on the following healthy behaviors: medication adherence and tobacco cessation    Patient given educational materials on: See Attached    I have instructed Hamilton Goode to complete a self tracking handout on none and instructed them to bring it with them to his next appointment. Barriers to learning and self management: none    Discussed use, benefit, and side effects of prescribed medications. Barriers to medication compliance addressed. All patient questions answered. Pt voiced understanding.        Electronically signed by KATHLEEN Parra CNP on 9/2/2021 at 11:16 AM

## 2021-09-03 ENCOUNTER — TELEPHONE (OUTPATIENT)
Dept: FAMILY MEDICINE CLINIC | Age: 51
End: 2021-09-03

## 2021-09-03 NOTE — TELEPHONE ENCOUNTER
Patient is scheduled for [facility]E at [facility] on September 9 @ 10am. He is aware of date, time and location and was advised to arrive 30 minutes prior.

## 2021-09-04 LAB — F-ACTIN AB IGG: 8 UNITS (ref 0–19)

## 2021-09-09 ENCOUNTER — HOSPITAL ENCOUNTER (OUTPATIENT)
Dept: INTERVENTIONAL RADIOLOGY/VASCULAR | Age: 51
Discharge: HOME | End: 2021-09-09
Payer: MEDICARE

## 2021-09-09 ENCOUNTER — TELEPHONE (OUTPATIENT)
Dept: FAMILY MEDICINE CLINIC | Age: 51
End: 2021-09-09

## 2021-09-09 DIAGNOSIS — I73.9 PVD (PERIPHERAL VASCULAR DISEASE) (HCC): ICD-10-CM

## 2021-09-09 PROCEDURE — 93922 UPR/L XTREMITY ART 2 LEVELS: CPT

## 2021-09-09 NOTE — TELEPHONE ENCOUNTER
----- Message from KATHLEEN Tillman CNP sent at 9/9/2021  1:37 PM EDT -----  Let Marycarmen Varela know his ankle vascular study is normal. How is his left leg doing? Any improvement?

## 2021-09-13 NOTE — TELEPHONE ENCOUNTER
Artem Albarran the color is improving. I would continue using compression hose. I would recommend he use them daily, especially if he knows hes going to be on his feet a lot. As far as the numbness goes, is this just since his leg swelled up, or was he having this before? This could be coming from his back.

## 2021-09-13 NOTE — TELEPHONE ENCOUNTER
I suspect it's probably coming from his back, honestly. I would monitor for now. F/u if worsening or not improving.

## 2021-09-13 NOTE — TELEPHONE ENCOUNTER
Patient has been informed and voiced understanding and states that there is improvement and that the color is fading away but the numbness is still present

## 2021-09-16 ENCOUNTER — OFFICE VISIT (OUTPATIENT)
Dept: FAMILY MEDICINE CLINIC | Age: 51
End: 2021-09-16
Payer: MEDICARE

## 2021-09-16 VITALS
BODY MASS INDEX: 44.43 KG/M2 | RESPIRATION RATE: 12 BRPM | HEIGHT: 69 IN | OXYGEN SATURATION: 97 % | SYSTOLIC BLOOD PRESSURE: 104 MMHG | DIASTOLIC BLOOD PRESSURE: 70 MMHG | TEMPERATURE: 97.7 F | WEIGHT: 300 LBS | HEART RATE: 82 BPM

## 2021-09-16 DIAGNOSIS — Z00.00 ROUTINE GENERAL MEDICAL EXAMINATION AT A HEALTH CARE FACILITY: Primary | ICD-10-CM

## 2021-09-16 DIAGNOSIS — R00.0 TACHYCARDIA: ICD-10-CM

## 2021-09-16 DIAGNOSIS — I87.2 EDEMA OF BOTH LOWER EXTREMITIES DUE TO PERIPHERAL VENOUS INSUFFICIENCY: ICD-10-CM

## 2021-09-16 DIAGNOSIS — I10 ESSENTIAL HYPERTENSION: ICD-10-CM

## 2021-09-16 DIAGNOSIS — E11.9 TYPE 2 DIABETES MELLITUS WITHOUT COMPLICATION, WITHOUT LONG-TERM CURRENT USE OF INSULIN (HCC): ICD-10-CM

## 2021-09-16 LAB — HBA1C MFR BLD: 7.4 % (ref 4.3–5.7)

## 2021-09-16 PROCEDURE — 3017F COLORECTAL CA SCREEN DOC REV: CPT | Performed by: NURSE PRACTITIONER

## 2021-09-16 PROCEDURE — 3051F HG A1C>EQUAL 7.0%<8.0%: CPT | Performed by: NURSE PRACTITIONER

## 2021-09-16 PROCEDURE — G0438 PPPS, INITIAL VISIT: HCPCS | Performed by: NURSE PRACTITIONER

## 2021-09-16 RX ORDER — METFORMIN HYDROCHLORIDE 500 MG/1
500 TABLET, EXTENDED RELEASE ORAL
Qty: 90 TABLET | Refills: 1 | Status: SHIPPED | OUTPATIENT
Start: 2021-09-16 | End: 2022-02-28

## 2021-09-16 ASSESSMENT — PATIENT HEALTH QUESTIONNAIRE - PHQ9
SUM OF ALL RESPONSES TO PHQ9 QUESTIONS 1 & 2: 0
SUM OF ALL RESPONSES TO PHQ QUESTIONS 1-9: 0
2. FEELING DOWN, DEPRESSED OR HOPELESS: 0
SUM OF ALL RESPONSES TO PHQ QUESTIONS 1-9: 0
SUM OF ALL RESPONSES TO PHQ QUESTIONS 1-9: 0
1. LITTLE INTEREST OR PLEASURE IN DOING THINGS: 0

## 2021-09-16 ASSESSMENT — LIFESTYLE VARIABLES: HOW OFTEN DO YOU HAVE A DRINK CONTAINING ALCOHOL: 0

## 2021-09-16 NOTE — PATIENT INSTRUCTIONS
Personalized Preventive Plan for Cherry Naranjo - 9/16/2021  Medicare offers a range of preventive health benefits. Some of the tests and screenings are paid in full while other may be subject to a deductible, co-insurance, and/or copay. Some of these benefits include a comprehensive review of your medical history including lifestyle, illnesses that may run in your family, and various assessments and screenings as appropriate. After reviewing your medical record and screening and assessments performed today your provider may have ordered immunizations, labs, imaging, and/or referrals for you. A list of these orders (if applicable) as well as your Preventive Care list are included within your After Visit Summary for your review. Other Preventive Recommendations:    · A preventive eye exam performed by an eye specialist is recommended every 1-2 years to screen for glaucoma; cataracts, macular degeneration, and other eye disorders. · A preventive dental visit is recommended every 6 months. · Try to get at least 150 minutes of exercise per week or 10,000 steps per day on a pedometer . · Order or download the FREE \"Exercise & Physical Activity: Your Everyday Guide\" from [employer]. Call 0-774.906.4356 or search [employer] online. · You need 0226-8764 mg of calcium and 2031-6663 IU of vitamin D per day. It is possible to meet your calcium requirement with diet alone, but a vitamin D supplement is usually necessary to meet this goal.  · When exposed to the sun, use a sunscreen that protects against both UVA and UVB radiation with an SPF of 30 or greater. Reapply every 2 to 3 hours or after sweating, drying off with a towel, or swimming. · Always wear a seat belt when traveling in a car. Always wear a helmet when riding a bicycle or motorcycle.

## 2021-09-16 NOTE — PROGRESS NOTES
MD   varenicline (CHANTIX CONTINUING MONTH EVENS) 1 MG tablet Take 1 tablet by mouth 2 times daily Yes KATHLEEN Santos CNP   Handicap Placard MISC by Does not apply route Expires 2/17/2026 Yes KATHLEEN Santos CNP   Elastic Bandages & Supports (KNEE BRACE/HINGED/REGULAR) MISC 1 Device by Does not apply route daily Yes KATHLEEN Santos CNP   lamoTRIgine (LAMICTAL) 25 MG tablet  Yes Historical Provider, MD   NARCAN 4 MG/0.1ML LIQD nasal spray  Yes Historical Provider, MD   oxyCODONE (ROXICODONE) 5 MG immediate release tablet 10 mg.   Yes Historical Provider, MD   QUEtiapine (SEROQUEL) 100 MG tablet Take 100 mg by mouth nightly Yes Historical Provider, MD   vitamin E 400 UNIT capsule Take 1 capsule by mouth 2 times daily  Angella Edmondson MD         Past Medical History:   Diagnosis Date    Bipolar disorder with depression ([facility] [address]     Cervical spinal stenosis     Cigarette nicotine dependence without complication 9/9/1671    Degenerative lumbar spinal stenosis 2/4/2021    Essential hypertension     Herniated lumbar intervertebral disc 2/4/2021    Primary osteoarthritis of right knee 2/4/2021    Tachycardia        Past Surgical History:   Procedure Laterality Date    KNEE SURGERY           Family History   Problem Relation Age of Onset    Colon Cancer Neg Hx     Esophageal Cancer Neg Hx     Liver Cancer Neg Hx     Rectal Cancer Neg Hx     Stomach Cancer Neg Hx        CareTeam (Including outside providers/suppliers regularly involved in providing care):   Patient Care Team:  KATHLEEN Santos CNP as PCP - General (Family Medicine)  KATHLEEN Santos CNP as PCP - REHABILITATION [facility] Empaneled Provider    Wt Readings from Last 3 Encounters:   09/16/21 300 lb (136.1 kg)   09/02/21 292 lb (132.5 kg)   06/11/21 (!) 311 lb (141.1 kg)     Vitals:    09/16/21 1400   BP: 104/70   Pulse: 82   Resp: 12   Temp: 97.7 °F (36.5 °C)   TempSrc: Oral   SpO2: 97%   Weight: 300 lb (136.1 kg)   Height: 5' 9\" (1.753 m) Body mass index is 44.3 kg/m². Based upon direct observation of the patient, evaluation of cognition reveals recent and remote memory intact. General Appearance: alert and oriented to person, place and time, well developed and well- nourished, in no acute distress  Skin: warm and dry, no rash or erythema  Head: normocephalic and atraumatic  Eyes: pupils equal, round, and reactive to light, extraocular eye movements intact, conjunctivae normal  ENT: tympanic membrane, external ear and ear canal normal bilaterally, nose without deformity, nasal mucosa and turbinates normal without polyps  Neck: supple and non-tender without mass, no thyromegaly or thyroid nodules, no cervical lymphadenopathy  Pulmonary/Chest: clear to auscultation bilaterally- no wheezes, rales or rhonchi, normal air movement, no respiratory distress  Cardiovascular: normal rate, regular rhythm, normal S1 and S2, no murmurs, rubs, clicks, or gallops, distal pulses intact, no carotid bruits  Abdomen: soft, non-tender, non-distended, normal bowel sounds, no masses or organomegaly  Extremities: no cyanosis, clubbing or edema  Musculoskeletal: normal range of motion, no joint swelling, deformity or tenderness  Neurologic: reflexes normal and symmetric, no cranial nerve deficit, gait, coordination and speech normal    Patient's complete Health Risk Assessment and screening values have been reviewed and are found in Flowsheets. The following problems were reviewed today and where indicated follow up appointments were made and/or referrals ordered. Positive Risk Factor Screenings with Interventions:     Fall Risk:  Timed Up and Go Test > 12 seconds? (Complete if either Fall Risk answers are Yes): no  2 or more falls in past year?: no  Fall with injury in past year?: (!) yes          General Health and ACP:  General  In general, how would you say your health is?: (!) Poor  In the past 7 days, have you experienced any of the following?  New or Increased Pain, New or Increased Fatigue, Loneliness, Social Isolation, Stress or Anger?: None of These  Do you get the social and emotional support that you need?: Yes  Do you have a Living Will?: (!) No  Advance Directives     Power of  Living Will ACP-Advance Directive ACP-Power of     Not on File Not on File Not on File Not on File      General Health Risk Interventions:  · Poor self-assessment of health status: patient declines any further evaluation/treatment for this issue  · Pain issues: patient declines any further evaluation/treatment for this issue  · Fatigue: patient declines any further evaluation/treatment for this issue  · Loneliness: patient declines any further intervention for this issue  · Social isolation: patient declines any further intervention for this issue  · Stress: patient declines any further evaluation/treatment for this issue  · Anger: patient declines any further evaluation/treatment for this issue  · Inadequate social/emotional support: patient declines any further intervention for this issue  · No Living Will: Patient declines ACP discussion/assistance    Health Habits/Nutrition:  Health Habits/Nutrition  Do you exercise for at least 20 minutes 2-3 times per week?: Yes  Have you lost any weight without trying in the past 3 months?: No  Do you eat only one meal per day?: (!) Yes  Have you seen the dentist within the past year?: Yes  Body mass index: (!) 44.3  Health Habits/Nutrition Interventions:  · Inadequate physical activity:  does get some walking  · Nutritional issues:  work on diabetic diet  · Dental exam overdue:  patient encouraged to make appointment with his/her dentist    Hearing/Vision:  No exam data present  Hearing/Vision  Do you or your family notice any trouble with your hearing that hasn't been managed with hearing aids?: No  Do you have difficulty driving, watching TV, or doing any of your daily activities because of your eyesight?: No  Have you had an eye exam within the past year?: (!) No    Safety:  Safety  Do you have working smoke detectors?: Yes  Have all throw rugs been removed or fastened?: Yes  Do you have non-slip mats or surfaces in all bathtubs/showers?: (!) No  Do all of your stairways have a railing or banister?: Yes  Are your doorways, halls and stairs free of clutter?: Yes  Do you always fasten your seatbelt when you are in a car?: (!) No     Personalized Preventive Plan   Current Health Maintenance Status    There is no immunization history on file for this patient. Health Maintenance   Topic Date Due    COVID-19 Vaccine (1) Never done    HIV screen  Never done    DTaP/Tdap/Td vaccine (1 - Tdap) Never done    Colon cancer screen colonoscopy  Never done    Annual Wellness Visit (AWV)  Never done    Shingles Vaccine (1 of 2) Never done    Flu vaccine (1) Never done    Potassium monitoring  09/01/2022    Creatinine monitoring  09/01/2022    A1C test (Diabetic or Prediabetic)  09/16/2022    Lipid screen  02/12/2026    Hepatitis C screen  Completed    Hepatitis A vaccine  Aged Out    Hepatitis B vaccine  Aged Out    Hib vaccine  Aged Out    Meningococcal (ACWY) vaccine  Aged Out    Pneumococcal 0-64 years Vaccine  Aged Out     Recommendations for [employer] Due: see orders and patient instructions/AVS.  . Recommended screening schedule for the next 5-10 years is provided to the patient in written form: see Patient Maryam Rodrigues was seen today for medicare awv, follow-up and other.     Diagnoses and all orders for this visit:    Routine general medical examination at a health care facility    Tachycardia  -     Holter Monitor 48 Hour; Future    Essential hypertension    Edema of both lower extremities due to peripheral venous insufficiency    Type 2 diabetes mellitus without complication, without long-term current use of insulin (HCC)  -     POCT glycosylated hemoglobin (Hb A1C)  -     metFORMIN (GLUCOPHAGE-XR) 500 MG extended release tablet;  Take 1 tablet by mouth daily (with breakfast)                - see attached forms for AWV  - con't Metoprolol, obtain holter monitor  - discussed diet/exercise and lifestyle changes for DM  - start Metformin, discussed benefits, risks and SE medication  - con't wearing compression hose

## 2021-09-21 ENCOUNTER — VIRTUAL VISIT (OUTPATIENT)
Dept: FAMILY MEDICINE CLINIC | Age: 51
End: 2021-09-21
Payer: MEDICARE

## 2021-09-21 ENCOUNTER — NURSE TRIAGE (OUTPATIENT)
Dept: OTHER | Facility: CLINIC | Age: 51
End: 2021-09-21

## 2021-09-21 ENCOUNTER — NURSE ONLY (OUTPATIENT)
Dept: FAMILY MEDICINE CLINIC | Age: 51
End: 2021-09-21

## 2021-09-21 DIAGNOSIS — R05.9 COUGH: Primary | ICD-10-CM

## 2021-09-21 DIAGNOSIS — J01.00 ACUTE NON-RECURRENT MAXILLARY SINUSITIS: ICD-10-CM

## 2021-09-21 DIAGNOSIS — J30.9 ALLERGIC RHINITIS, UNSPECIFIED SEASONALITY, UNSPECIFIED TRIGGER: Primary | ICD-10-CM

## 2021-09-21 DIAGNOSIS — H92.03 OTALGIA OF BOTH EARS: ICD-10-CM

## 2021-09-21 DIAGNOSIS — R09.81 NASAL CONGESTION: ICD-10-CM

## 2021-09-21 DIAGNOSIS — R05.9 COUGH: ICD-10-CM

## 2021-09-21 PROCEDURE — 3017F COLORECTAL CA SCREEN DOC REV: CPT | Performed by: NURSE PRACTITIONER

## 2021-09-21 PROCEDURE — 99214 OFFICE O/P EST MOD 30 MIN: CPT | Performed by: NURSE PRACTITIONER

## 2021-09-21 PROCEDURE — G8427 DOCREV CUR MEDS BY ELIG CLIN: HCPCS | Performed by: NURSE PRACTITIONER

## 2021-09-21 RX ORDER — AZITHROMYCIN 250 MG/1
250 TABLET, FILM COATED ORAL SEE ADMIN INSTRUCTIONS
Qty: 6 TABLET | Refills: 0 | Status: SHIPPED | OUTPATIENT
Start: 2021-09-21 | End: 2021-09-26

## 2021-09-21 RX ORDER — FLUTICASONE PROPIONATE 50 MCG
1 SPRAY, SUSPENSION (ML) NASAL DAILY
Qty: 32 G | Refills: 1 | Status: SHIPPED | OUTPATIENT
Start: 2021-09-21

## 2021-09-21 ASSESSMENT — ENCOUNTER SYMPTOMS
SORE THROAT: 0
COUGH: 1
CHEST TIGHTNESS: 0
SINUS PAIN: 1
RHINORRHEA: 1
SHORTNESS OF BREATH: 0
SINUS PRESSURE: 1
CHOKING: 0
WHEEZING: 0
TROUBLE SWALLOWING: 0

## 2021-09-21 NOTE — PROGRESS NOTES
2021    TELEHEALTH EVALUATION -- Audio/Visual (During KQSNF-97 public health emergency)    HPI:  Visit conducted with pt at home and provider Anuja Morales CNP in office   Particwalt Flores (:  1970) has requested an audio/video evaluation for the following concern(s):    URI Symptoms    HPI:      Symptoms have been present for 3 day(s). Symptoms are better since they initially started. Fever? No  Runny nose or congestion? Yes - more left facial congestion noted    Cough? A dry cough   Sore throat? No sore throat but feels drainage trickle down the back of his throat making him cough, no known covid exposures lives by himself, does not think he has been exposed. Headache, fatigue, joint pains, muscle aches? No  Shortness of breath/Wheezing? No  Nausea/Vomiting/Diarrhea? No  Double Sickening? No  Sick contacts? No    States both ears hurt left more than right, hearing feels muffled, hs never had cerumen impaction in past.     Patient has tried otc meds  with out improvement. Review of Systems   Constitutional: Negative for chills, fatigue and fever. HENT: Positive for congestion, ear pain, rhinorrhea, sinus pressure and sinus pain. Negative for ear discharge, hearing loss, sneezing, sore throat, tinnitus and trouble swallowing. Respiratory: Positive for cough. Negative for choking, chest tightness, shortness of breath and wheezing. Cardiovascular: Negative. Genitourinary: Negative for difficulty urinating. Musculoskeletal: Negative for arthralgias and myalgias. Skin: Negative. Neurological: Negative for dizziness, facial asymmetry, light-headedness and headaches. Prior to Visit Medications    Medication Sig Taking?  Authorizing Provider   azithromycin (ZITHROMAX) 250 MG tablet Take 1 tablet by mouth See Admin Instructions for 5 days 500mg on day 1 followed by 250mg on days 2 - 5 Yes KATHLEEN Brown CNP   fluticasone (FLONASE) 50 MCG/ACT nasal spray 1 spray by Each Nostril route daily Yes KATHLEEN Geronimo CNP   metFORMIN (GLUCOPHAGE-XR) 500 MG extended release tablet Take 1 tablet by mouth daily (with breakfast)  KATHLEEN Barrientos CNP   hydroCHLOROthiazide (HYDRODIURIL) 12.5 MG tablet take 1 tablet by mouth once daily  KATHLEEN Barrientos CNP   busPIRone (BUSPAR) 15 MG tablet Take 15 mg by mouth 3 times daily  KATHLEEN Barrientos CNP   metoprolol succinate (TOPROL XL) 50 MG extended release tablet Take 1 tablet by mouth daily  KATHLEEN Barrientos CNP   gabapentin (NEURONTIN) 800 MG tablet Take 1 tablet by mouth 4 times daily for 180 days. KATHLEEN Barrientos CNP   baclofen (LIORESAL) 10 MG tablet Take 1 tablet by mouth 3 times daily  KATHLEEN Barrientos CNP   oxyCODONE HCl (OXY-IR) 10 MG immediate release tablet take 1 tablet by mouth three times a day if needed for pain  Historical Provider, MD   vitamin E 400 UNIT capsule Take 1 capsule by mouth 2 times daily  Karis Galvan MD   varenicline (CHANTIX CONTINUING MONTH ) 1 MG tablet Take 1 tablet by mouth 2 times daily  KATHLEEN Barrientos CNP   Handicap Placard MISC by Does not apply route Expires 2026  KATHLEEN Barrientos CNP   Elastic Bandages & Supports (KNEE BRACE/HINGED/REGULAR) MISC 1 Device by Does not apply route daily  KATHLEEN Barrientos CNP   lamoTRIgine (LAMICTAL) 25 MG tablet   Historical Provider, MD   NARCAN 4 MG/0.1ML LIQD nasal spray   Historical Provider, MD   oxyCODONE (ROXICODONE) 5 MG immediate release tablet 10 mg.    Historical Provider, MD   QUEtiapine (SEROQUEL) 100 MG tablet Take 100 mg by mouth nightly  Historical Provider, MD       Social History     Tobacco Use    Smoking status: Former Smoker     Packs/day: 0.25     Types: Cigarettes     Start date:      Quit date: 2021     Years since quittin.5    Smokeless tobacco: Never Used   Substance Use Topics    Alcohol use: Never    Drug use: Never        Allergies   Allergen Reactions    Tylenol [Acetaminophen]     Celebrex [Celecoxib] Rash   ,   Past Medical History:   Diagnosis Date    Bipolar disorder with depression ([facility] [address]     Cervical spinal stenosis     Cigarette nicotine dependence without complication     Degenerative lumbar spinal stenosis 2021    Essential hypertension     Herniated lumbar intervertebral disc 2021    Primary osteoarthritis of right knee 2021    Tachycardia    ,   Past Surgical History:   Procedure Laterality Date    KNEE SURGERY     ,   Social History     Tobacco Use    Smoking status: Former Smoker     Packs/day: 0.25     Types: Cigarettes     Start date:      Quit date: 2021     Years since quittin.5    Smokeless tobacco: Never Used   Substance Use Topics    Alcohol use: Never    Drug use: Never   ,   Family History   Problem Relation Age of Onset    Colon Cancer Neg Hx     Esophageal Cancer Neg Hx     Liver Cancer Neg Hx     Rectal Cancer Neg Hx     Stomach Cancer Neg Hx    ,   There is no immunization history on file for this patient.,   Health Maintenance   Topic Date Due    Pneumococcal 0-64 years Vaccine (1 of 2 - PPSV23) Never done    Diabetic foot exam  Never done    Diabetic retinal exam  Never done    COVID-19 Vaccine (1) Never done    HIV screen  Never done    Diabetic microalbuminuria test  Never done    Hepatitis B vaccine (1 of 3 - Risk 3-dose series) Never done    DTaP/Tdap/Td vaccine (1 - Tdap) Never done    Colon cancer screen colonoscopy  Never done    Shingles Vaccine (1 of 2) Never done    Flu vaccine (1) Never done    Lipid screen  2022    Potassium monitoring  2022    Creatinine monitoring  2022    A1C test (Diabetic or Prediabetic)  2022    Annual Wellness Visit (AWV)  2022    Hepatitis C screen  Completed    Hepatitis A vaccine  Aged Out    Hib vaccine  Aged Out    Meningococcal (ACWY) vaccine  Aged Out       PHYSICAL EXAMINATION:  [ INSTRUCTIONS:  \"[x]\" congestion  flonase  Rule out covid  Pt in agreement with pan   - COVID-19; Future      Return if symptoms worsen or fail to improve. Jaylin Jaramillo, was evaluated through a synchronous (real-time) audio-video encounter. The patient (or guardian if applicable) is aware that this is a billable service. Verbal consent to proceed has been obtained within the past 12 months. The visit was conducted pursuant to the emergency declaration under the [address] [place] [address] and the [employer] Act. Patient identification was verified, and a caregiver was present when appropriate. The patient was located in a state where the provider was credentialed to provide care. Total time spent on this encounter: Not billed by time    --KATHLEEN Munoz CNP on 9/21/2021 at 12:24 PM    An electronic signature was used to authenticate this note.

## 2021-09-21 NOTE — TELEPHONE ENCOUNTER
Reason for Disposition   Earache    Answer Assessment - Initial Assessment Questions  1. LOCATION: \"Where does it hurt? \"       Has pain in the facial bone and both ears    2. ONSET: \"When did the sinus pain start? \"  (e.g., hours, days)       Monday     3. SEVERITY: \"How bad is the pain? \"   (Scale 1-10; mild, moderate or severe)    - MILD (1-3): doesn't interfere with normal activities     - MODERATE (4-7): interferes with normal activities (e.g., work or school) or awakens from sleep    - SEVERE (8-10): excruciating pain and patient unable to do any normal activities         Pain in the ears is a 10    4. RECURRENT SYMPTOM: \"Have you ever had sinus problems before? \" If so, ask: \"When was the last time? \" and \"What happened that time? \"       Yes, has been several years, usually uses antibiotics    5. NASAL CONGESTION: \"Is the nose blocked? \" If so, ask, \"Can you open it or must you breathe through the mouth? \"      Yes    6. NASAL DISCHARGE: \"Do you have discharge from your nose? \" If so ask, \"What color? \"      Yes clear drainage    7. FEVER: \"Do you have a fever? \" If so, ask: \"What is it, how was it measured, and when did it start? \"       No    8. OTHER SYMPTOMS: \"Do you have any other symptoms? \" (e.g., sore throat, cough, earache, difficulty breathing)      Cough, Earache, congestion    9. PREGNANCY: \"Is there any chance you are pregnant? \" \"When was your last menstrual period? \"      NO    Protocols used: SINUS PAIN OR CONGESTION-ADULT-AH    [facility]  with Red Flag Complaint. Brief description of triage: sinus congestion with ear pain    Triage indicates for patient to be seen in the office today    Care advice provided, patient verbalizes understanding; denies any other questions or concerns; instructed to call back for any new or worsening symptoms. Writer provided warm transfer to [facility] at [facility] for appointment scheduling. Attention Provider:   Thank you for allowing me to participate in the care of your patient. The patient was connected to triage in response to information provided to the ECC. Please do not respond through this encounter as the response is not directed to a shared pool.

## 2021-09-22 ENCOUNTER — TELEPHONE (OUTPATIENT)
Dept: FAMILY MEDICINE CLINIC | Age: 51
End: 2021-09-22

## 2021-09-22 NOTE — TELEPHONE ENCOUNTER
[facility] 9/29/21 @10:30am with 10:15 arrival to second floor heart center. Mask, id, insurance card. Pt informed.

## 2021-09-24 ENCOUNTER — TELEPHONE (OUTPATIENT)
Dept: FAMILY MEDICINE CLINIC | Age: 51
End: 2021-09-24

## 2021-09-24 LAB
SARS-COV-2: NOT DETECTED
SOURCE: NORMAL

## 2021-09-24 NOTE — TELEPHONE ENCOUNTER
----- Message from KATHLEEN Reece CNP sent at 9/24/2021  8:35 AM EDT -----  Let pt know his covid test is negative

## 2021-09-29 ENCOUNTER — HOSPITAL ENCOUNTER (OUTPATIENT)
Dept: NON INVASIVE DIAGNOSTICS | Age: 51
Discharge: HOME | End: 2021-09-29
Payer: MEDICARE

## 2021-09-29 DIAGNOSIS — R00.0 TACHYCARDIA: ICD-10-CM

## 2021-09-29 PROCEDURE — 93225 XTRNL ECG REC<48 HRS REC: CPT

## 2021-09-29 PROCEDURE — 93226 XTRNL ECG REC<48 HR SCAN A/R: CPT

## 2021-09-29 NOTE — PROCEDURES
The skin was prepped and a holter monitor was applied. The patient was instructed on the documentation of symptoms and the purpose of the holter as well as the things to avoid while wearing the holter. The patient was instructed to remove and return the holter on 10/01/2021.   The serial number of the holter that was applied is [id number]

## 2021-10-08 LAB
ACQUISITION DURATION: NORMAL S
AVERAGE HEART RATE: 91 BPM
HOOKUP DATE: NORMAL
HOOKUP TIME: NORMAL
MAX HEART RATE TIME/DATE: NORMAL
MAX HEART RATE: 146 BPM
MIN HEART RATE TIME/DATE: NORMAL
MIN HEART RATE: 48 BPM
NUMBER OF QRS COMPLEXES: NORMAL
NUMBER OF SUPRAVENTRICULAR COUPLETS: 0
NUMBER OF SUPRAVENTRICULAR ECTOPICS: 0
NUMBER OF SUPRAVENTRICULAR ISOLATED BEATS: 0
NUMBER OF VENTRICULAR BIGEMINAL CYCLES: 0
NUMBER OF VENTRICULAR COUPLETS: 0
NUMBER OF VENTRICULAR ECTOPICS: 0

## 2021-10-11 ENCOUNTER — TELEPHONE (OUTPATIENT)
Dept: FAMILY MEDICINE CLINIC | Age: 51
End: 2021-10-11

## 2021-10-11 NOTE — TELEPHONE ENCOUNTER
----- Message from KATHLEEN Judd CNP sent at 10/11/2021 12:45 PM EDT -----  Let Tawana Tao know his holter monitor did not identify any abnormal rhythms. His HR did range from the 90's to 140 range, so he did have some fast HR's, but they were not any serious or life threatening heart rhythms. At this point, I would continue the Metoprolol. Looks like his last resting HR in the office was in the 80's, which is normal. I would like to f/u with him middle of December to recheck A1C for the DM and also recheck HR/BP.

## 2021-10-12 NOTE — TELEPHONE ENCOUNTER
Pt informed and verbalized understanding.   Future Appointments   Date Time Provider [place]   12/15/2021 11:00 AM Shine Salcedo, APRN - CNP [id number]

## 2021-12-26 DIAGNOSIS — M48.061 DEGENERATIVE LUMBAR SPINAL STENOSIS: ICD-10-CM

## 2021-12-26 DIAGNOSIS — M51.26 HERNIATED LUMBAR INTERVERTEBRAL DISC: ICD-10-CM

## 2021-12-27 RX ORDER — GABAPENTIN 800 MG/1
TABLET ORAL
Qty: 360 TABLET | Refills: 1 | Status: SHIPPED | OUTPATIENT
Start: 2021-12-27 | End: 2022-06-14

## 2022-01-11 DIAGNOSIS — M51.26 HERNIATED LUMBAR INTERVERTEBRAL DISC: ICD-10-CM

## 2022-01-11 DIAGNOSIS — M48.061 DEGENERATIVE LUMBAR SPINAL STENOSIS: ICD-10-CM

## 2022-01-12 RX ORDER — BACLOFEN 10 MG/1
TABLET ORAL
Qty: 60 TABLET | Refills: 1 | Status: SHIPPED | OUTPATIENT
Start: 2022-01-12 | End: 2022-02-17

## 2022-02-17 DIAGNOSIS — M51.26 HERNIATED LUMBAR INTERVERTEBRAL DISC: ICD-10-CM

## 2022-02-17 DIAGNOSIS — M48.061 DEGENERATIVE LUMBAR SPINAL STENOSIS: ICD-10-CM

## 2022-02-17 RX ORDER — BACLOFEN 10 MG/1
TABLET ORAL
Qty: 90 TABLET | Refills: 2 | Status: SHIPPED | OUTPATIENT
Start: 2022-02-17 | End: 2022-05-20

## 2022-02-17 NOTE — TELEPHONE ENCOUNTER
Recent Visits  Date Type Provider Dept   09/16/21 Office Visit KATHLEEN Shirley CNP [facility]   09/02/21 Office Visit KATHLEEN Shirley CNP [facility]   06/11/21 Office Visit KATHLEEN Shirley CNP [facility]   04/12/21 Office Visit KATHLEEN Shirley CNP [facility]   02/18/21 Office Visit KATHLEEN Shirley CNP [facility]   02/04/21 Office Visit KATHLEEN Shirley CNP [facility]   Showing recent visits within past 540 days with a meds authorizing provider and meeting all other requirements  Future Appointments  No visits were found meeting these conditions. Showing future appointments within next 150 days with a meds authorizing provider and meeting all other requirements          No future appointments.

## 2022-02-26 DIAGNOSIS — E11.9 TYPE 2 DIABETES MELLITUS WITHOUT COMPLICATION, WITHOUT LONG-TERM CURRENT USE OF INSULIN (HCC): ICD-10-CM

## 2022-02-28 RX ORDER — METFORMIN HYDROCHLORIDE 500 MG/1
TABLET, EXTENDED RELEASE ORAL
Qty: 90 TABLET | Refills: 1 | Status: SHIPPED | OUTPATIENT
Start: 2022-02-28 | End: 2022-08-15

## 2022-05-03 DIAGNOSIS — R00.0 TACHYCARDIA: ICD-10-CM

## 2022-05-03 DIAGNOSIS — I10 ESSENTIAL HYPERTENSION: ICD-10-CM

## 2022-05-03 DIAGNOSIS — F41.9 ANXIETY: ICD-10-CM

## 2022-05-03 RX ORDER — METOPROLOL SUCCINATE 50 MG/1
TABLET, EXTENDED RELEASE ORAL
Qty: 90 TABLET | Refills: 1 | Status: SHIPPED | OUTPATIENT
Start: 2022-05-03 | End: 2022-10-17

## 2022-05-03 RX ORDER — BUSPIRONE HYDROCHLORIDE 15 MG/1
TABLET ORAL
Qty: 270 TABLET | Refills: 1 | Status: SHIPPED | OUTPATIENT
Start: 2022-05-03 | End: 2022-10-17

## 2022-05-19 DIAGNOSIS — M51.26 HERNIATED LUMBAR INTERVERTEBRAL DISC: ICD-10-CM

## 2022-05-19 DIAGNOSIS — M48.061 DEGENERATIVE LUMBAR SPINAL STENOSIS: ICD-10-CM

## 2022-05-20 RX ORDER — BACLOFEN 10 MG/1
TABLET ORAL
Qty: 90 TABLET | Refills: 2 | Status: SHIPPED | OUTPATIENT
Start: 2022-05-20 | End: 2022-06-07

## 2022-05-24 ENCOUNTER — OFFICE VISIT (OUTPATIENT)
Dept: FAMILY MEDICINE CLINIC | Age: 52
End: 2022-05-24
Payer: MEDICARE

## 2022-05-24 VITALS
BODY MASS INDEX: 41.05 KG/M2 | DIASTOLIC BLOOD PRESSURE: 82 MMHG | RESPIRATION RATE: 20 BRPM | HEIGHT: 71 IN | SYSTOLIC BLOOD PRESSURE: 126 MMHG | WEIGHT: 293.2 LBS | TEMPERATURE: 98.5 F | HEART RATE: 118 BPM | OXYGEN SATURATION: 98 %

## 2022-05-24 DIAGNOSIS — F31.31 BIPOLAR AFFECTIVE DISORDER, CURRENTLY DEPRESSED, MILD (HCC): ICD-10-CM

## 2022-05-24 DIAGNOSIS — M48.061 DEGENERATIVE LUMBAR SPINAL STENOSIS: ICD-10-CM

## 2022-05-24 DIAGNOSIS — M48.02 CERVICAL SPINAL STENOSIS: ICD-10-CM

## 2022-05-24 DIAGNOSIS — M70.61 TROCHANTERIC BURSITIS OF RIGHT HIP: ICD-10-CM

## 2022-05-24 DIAGNOSIS — I73.9 PVD (PERIPHERAL VASCULAR DISEASE) (HCC): Primary | ICD-10-CM

## 2022-05-24 DIAGNOSIS — E66.01 OBESITY, CLASS III, BMI 40-49.9 (MORBID OBESITY) (HCC): ICD-10-CM

## 2022-05-24 DIAGNOSIS — M54.12 CERVICAL RADICULOPATHY: ICD-10-CM

## 2022-05-24 DIAGNOSIS — M54.16 LUMBAR RADICULOPATHY: ICD-10-CM

## 2022-05-24 DIAGNOSIS — E11.9 TYPE 2 DIABETES MELLITUS WITHOUT COMPLICATION, WITHOUT LONG-TERM CURRENT USE OF INSULIN (HCC): ICD-10-CM

## 2022-05-24 PROCEDURE — 99214 OFFICE O/P EST MOD 30 MIN: CPT | Performed by: NURSE PRACTITIONER

## 2022-05-24 PROCEDURE — 1036F TOBACCO NON-USER: CPT | Performed by: NURSE PRACTITIONER

## 2022-05-24 PROCEDURE — 3017F COLORECTAL CA SCREEN DOC REV: CPT | Performed by: NURSE PRACTITIONER

## 2022-05-24 PROCEDURE — 2022F DILAT RTA XM EVC RTNOPTHY: CPT | Performed by: NURSE PRACTITIONER

## 2022-05-24 PROCEDURE — G8417 CALC BMI ABV UP PARAM F/U: HCPCS | Performed by: NURSE PRACTITIONER

## 2022-05-24 PROCEDURE — 3046F HEMOGLOBIN A1C LEVEL >9.0%: CPT | Performed by: NURSE PRACTITIONER

## 2022-05-24 PROCEDURE — G8427 DOCREV CUR MEDS BY ELIG CLIN: HCPCS | Performed by: NURSE PRACTITIONER

## 2022-05-24 NOTE — PROGRESS NOTES
[facility] at / [place] [address] [facility], . [address]  Chief Complaint   Patient presents with    Numbness     bilateral leg numbness, left worse     Numbness     left arm numbness and right pinky finger only       History obtained from chart review and the patient. SUBJECTIVE:  Ester Webb is a 47 y.o. male that presents today for back and neck pain    Patient is following with pain management for cervical and lumbar spinal stenosis. He has a lot of pain in his right hip. He cannot lay on his right side at night because of the pain. He has been seeing the chiropractor for his back and neck, the chiropractor thought he might have some bursitis in his hip. He has numbness in his left hand which goes up to his shoulder. He also has numbness in his right hand, but only in his 5th finger. Chiropractor rec'd doing some physical therapy for decompression. Denies any bowel or bladder incontinence and no pelvic analgesia. He also will get a stinging pain in his left upper arm, lasts for 5 minutes and then goes away. He is taking Metformin for DM. He is compliant with therapy missing doses infrequently. Not checking sugars. Diet is fair. No exercise.     Age/Gender Health Maintenance    Lipid -   No results found for: CHOL  No results found for: TRIG  Lab Results   Component Value Date    HDL 45 02/12/2021     Lab Results   Component Value Date    LDLCALC 106 02/12/2021     DM Screen -   Lab Results   Component Value Date    LABA1C 7.4 (H) 09/16/2021     Colon Cancer Screening - 51  Lung Cancer Screening (Age 54 to [de-identified] with 30 pack year hx, current smoker or quit within past 15 years) - 55    Tetanus - needs  Influenza Vaccine - yearly  Pneumonia Vaccine - 66  Zostavax - 51     PSA testing discussion - 56  AAA Screening - 66    Falls screening - n/a    Current Outpatient Medications   Medication Sig Dispense Refill    baclofen (LIORESAL) 10 MG tablet take 1 tablet by mouth three times a day 90 tablet 2    metoprolol succinate (TOPROL XL) 50 MG extended release tablet take 1 tablet by mouth once daily 90 tablet 1    busPIRone (BUSPAR) 15 MG tablet take 1 tablet by mouth three times a day 270 tablet 1    metFORMIN (GLUCOPHAGE-XR) 500 MG extended release tablet take 1 tablet by mouth every morning with food 90 tablet 1    gabapentin (NEURONTIN) 800 MG tablet take 1 tablet by mouth four times a day 360 tablet 1    fluticasone (FLONASE) 50 MCG/ACT nasal spray 1 spray by Each Nostril route daily 32 g 1    hydroCHLOROthiazide (HYDRODIURIL) 12.5 MG tablet take 1 tablet by mouth once daily 90 tablet 3    oxyCODONE HCl (OXY-IR) 10 MG immediate release tablet take 1 tablet by mouth three times a day if needed for pain      Elastic Bandages & Supports (KNEE BRACE/HINGED/REGULAR) MISC 1 Device by Does not apply route daily 1 each 0    lamoTRIgine (LAMICTAL) 25 MG tablet       NARCAN 4 MG/0.1ML LIQD nasal spray       oxyCODONE (ROXICODONE) 5 MG immediate release tablet 10 mg.       QUEtiapine (SEROQUEL) 100 MG tablet Take 100 mg by mouth nightly      vitamin E 400 UNIT capsule Take 1 capsule by mouth 2 times daily 60 capsule 3    Handicap Placard MISC by Does not apply route Expires 2/17/2026 1 each 0     No current facility-administered medications for this visit. No orders of the defined types were placed in this encounter. All medications reviewed and reconciled, including OTC and herbal medications. Updated list given to patient.        Patient Active Problem List   Diagnosis    Herniated lumbar intervertebral disc    Primary osteoarthritis of right knee    Cervical spinal stenosis    Degenerative lumbar spinal stenosis    Bipolar affective disorder, currently depressed, mild (HCC)    Cigarette nicotine dependence without complication    Tachycardia    Essential hypertension    Anxiety    Dependent edema    Edema of both lower extremities due to peripheral venous insufficiency    Type 2 diabetes mellitus without complication, without long-term current use of insulin (HCC)    PVD (peripheral vascular disease) ([facility])       Past Medical History:   Diagnosis Date    Bipolar disorder with depression ([facility] [address]     Cervical spinal stenosis     Cigarette nicotine dependence without complication 3/1/9563    Degenerative lumbar spinal stenosis 2021    Essential hypertension     Herniated lumbar intervertebral disc 2021    Primary osteoarthritis of right knee 2021    Tachycardia        Past Surgical History:   Procedure Laterality Date    KNEE SURGERY         Allergies   Allergen Reactions    Tylenol [Acetaminophen]     Celebrex [Celecoxib] Rash       Social History     Socioeconomic History    Marital status: Single     Spouse name: Not on file    Number of children: Not on file    Years of education: Not on file    Highest education level: Not on file   Occupational History    Not on file   Tobacco Use    Smoking status: Former Smoker     Packs/day: 0.25     Types: Cigarettes     Start date:      Quit date: 2021     Years since quittin.2    Smokeless tobacco: Never Used   Substance and Sexual Activity    Alcohol use: Never    Drug use: Never    Sexual activity: Not on file   Other Topics Concern    Not on file   Social History Narrative    Not on file     Social Determinants of Health     Financial Resource Strain:     Difficulty of Paying Living Expenses: Not on file   Food Insecurity:     Worried About Running Out of Food in the Last Year: Not on file    Lucy of Food in the Last Year: Not on file   Transportation Needs:     Lack of Transportation (Medical): Not on file    Lack of Transportation (Non-Medical):  Not on file   Physical Activity:     Days of Exercise per Week: Not on file    Minutes of Exercise per Session: Not on file   Stress:     Feeling of Stress : Not on file   Social Connections:     Frequency of Communication with Friends and Family: Not on file    Frequency of Social Gatherings with Friends and Family: Not on file    Attends Yarsani Services: Not on file    Active Member of Clubs or Organizations: Not on file    Attends Club or Organization Meetings: Not on file    Marital Status: Not on file   Intimate Partner Violence:     Fear of Current or Ex-Partner: Not on file    Emotionally Abused: Not on file    Physically Abused: Not on file    Sexually Abused: Not on file   Housing Stability:     Unable to Pay for Housing in the Last Year: Not on file    Number of [place] in the Last Year: Not on file    Unstable Housing in the Last Year: Not on file       Family History   Problem Relation Age of Onset    Colon Cancer Neg Hx     Esophageal Cancer Neg Hx     Liver Cancer Neg Hx     Rectal Cancer Neg Hx     Stomach Cancer Neg Hx          I have reviewed the patient's past medical history, past surgical history, allergies, medications, social and family history and I have made updates where appropriate.       Review of Systems  Positive responses are highlighted in bold    Constitutional:  Fever, Chills, Night Sweats, Fatigue, Unexpected changes in weight  Eyes:  Eye discharge, Eye pain, Eye redness, Visual disturbances   HENT:  Ear pain, Tinnitus, Nosebleeds, Trouble swallowing, Hearing loss, Sore throat  Cardiovascular:  Chest Pain, Palpitations, Orthopnea, Paroxysmal Nocturnal Dyspnea  Respiratory:  Cough, Wheezing, Shortness of breath, Chest tightness, Apnea  Gastrointestinal:  Nausea, Vomiting, Diarrhea, Constipation, Heartburn, Blood in stool  Genitourinary:  Difficulty or painful urination, Flank pain, Change in frequency, Urgency  Skin:  Color change, Rash, Itching, Wound  Psychiatric:  Hallucinations, Anxiety, Depression, Suicidal ideation  Hematological:  Enlarged glands, Easy bleeding, Easily bruising  Musculoskeletal:  Joint pain, Back pain, Gait problems, Joint swelling, Myalgias  Neurological:  Dizziness, Headaches, Presyncope, Numbness, Seizures, Tremors  Allergy:  Environmental allergies, Food allergies  Endocrine:  Heat Intolerance, Cold Intolerance, Polydipsia, Polyphagia, Polyuria    Lab Results   Component Value Date     09/01/2021    K 3.8 09/01/2021     09/01/2021    CO2 26 09/01/2021    BUN 6 (L) 09/01/2021    CREATININE 1.0 09/01/2021    GLUCOSE 139 (H) 09/01/2021    CALCIUM 9.1 09/01/2021    PROT 6.3 09/01/2021    LABALBU 3.8 09/01/2021    BILITOT 0.3 09/01/2021    ALKPHOS 72 09/01/2021    AST 55 (H) 09/01/2021    ALT 62 09/01/2021    LABGLOM 79 (A) 09/01/2021     Lab Results   Component Value Date    WBC 8.9 09/01/2021    HGB 16.9 09/01/2021    HCT 51.4 09/01/2021    MCV 92.6 09/01/2021     09/01/2021     Lab Results   Component Value Date    TSH 3.120 02/12/2021       PHYSICAL EXAM:  Vitals:    05/24/22 1611   BP: 126/82   Site: Right Upper Arm   Position: Sitting   Cuff Size: Large Adult   Pulse: (!) 118   Resp: 20   Temp: 98.5 °F (36.9 °C)   TempSrc: Oral   SpO2: 98%   Weight: 293 lb 3.2 oz (133 kg)   Height: 5' 11\" (1.803 m)     Body mass index is 40.89 kg/m². VS Reviewed  General Appearance: A&O x 3, No acute distress,well developed and well- nourished  Head: normocephalic and atraumatic  Eyes: pupils equal, round, and reactive to light, extraocular eye movements intact, conjunctivae and eye lids without erythema  Neck: supple and non-tender without mass, no thyromegaly or thyroid nodules, no cervical lymphadenopathy  Pulmonary/Chest: clear to auscultation bilaterally- no wheezes, rales or rhonchi, normal air movement, no respiratory distress or retractions  Cardiovascular: S1 and S2 auscultated w/ RRR. No murmurs, rubs, clicks, or gallops, distal pulses intact. Abdomen: soft, non-tender, non-distended, bowl sounds physiologic,  no rebound or guarding, no masses or hernias noted. Liver and spleen without enlargement.    Extremities: warm and dry, DP/PT 2+ bilaterally, trace edema  Visual inspection:  Deformity/amputation: absent  Skin lesions/pre-ulcerative calluses: absent  Edema: right- trace, left- trace  Sensory exam:  Monofilament sensation: normal  (minimum of 5 random plantar locations tested, avoiding callused areas - > 1 area with absence of sensation is + for neuropathy)  Plus at least one of the following:  Pulses: normal,   Pinprick: Impaired  Proprioception: Impaired  Musculoskeletal: No joint swelling or gross deformity   Neuro:  Alert, 5/5 strength globally and symmetrically  Psych: Affect appropriate. Mood normal. Thought process is normal without evidence of depression or psychosis. Good insight and appropriate interaction. Cognition and memory appear to be intact. Skin: warm and dry, light brown pigmentation noted bilat lower extremities  Lymph:  No cervical, auricular or supraclavicular lymph nodes palpated    ASSESSMENT & PLAN  Hamilton Goode was seen today for numbness and numbness. Diagnoses and all orders for this visit:    PVD (peripheral vascular disease) ([facility]    Bipolar affective disorder, currently depressed, mild ([facility]    Type 2 diabetes mellitus without complication, without long-term current use of insulin ([facility]  -     Comprehensive Metabolic Panel; Future  -     Hemoglobin A1C; Future  -     Lipid, Fasting; Future  -     Microalbumin / Creatinine Urine Ratio;  Future  -     HM DIABETES FOOT EXAM    Cervical spinal stenosis  -     Cancel: [facility] - [facility]    Obesity, Class III, BMI 40-49.9 (morbid obesity) ([facility])    Degenerative lumbar spinal stenosis  -     Cancel: [facility] [address] [facility]    Cervical radiculopathy  -     Cancel: [facility] - [address] [facility] - [facility]    Lumbar radiculopathy  -     Cancel: [facility] - [facility]    Trochanteric bursitis of right hip  -     [facility] - [facility]      - PVD stable  - follows with psychiatry for bipolar  - follows with pain management  - con't Metformin  - obtain annual labs  - work on diet/exercise  - refer to PT    DISPOSITION    Return in about 6 months (around 11/24/2022) for AWV. Edward Higgins released without restrictions. PATIENT COUNSELING    Counseling was provided today regarding the following topics: Healthy eating habits, Regular exercise, substance abuse and healthy sleep habits. Edward Higgins received counseling on the following healthy behaviors: medication adherence and tobacco cessation    Patient given educational materials on: See Attached    I have instructed Edward Higgins to complete a self tracking handout on none and instructed them to bring it with them to his next appointment. Barriers to learning and self management: none    Discussed use, benefit, and side effects of prescribed medications. Barriers to medication compliance addressed. All patient questions answered. Pt voiced understanding.        Electronically signed by KATHLEEN Olivarez CNP on 5/24/2022 at 4:33 PM

## 2022-06-01 ENCOUNTER — HOSPITAL ENCOUNTER (OUTPATIENT)
Dept: PHYSICAL THERAPY | Age: 52
Setting detail: THERAPIES SERIES
Discharge: HOME | End: 2022-06-01
Payer: MEDICARE

## 2022-06-01 PROCEDURE — 97163 PT EVAL HIGH COMPLEX 45 MIN: CPT

## 2022-06-01 NOTE — PROGRESS NOTES
** PLEASE SIGN, DATE AND TIME CERTIFICATION BELOW AND RETURN TO [facility] OUTPATIENT REHABILITATION (FAX #: [phone]). ATTEST/CO-SIGN IF ACCESSING VIA [employer]. THANK YOU.**    I certify that I have examined the patient below and determined that Physical Medicine and Rehabilitation service is necessary and that I approve the established plan of care for up to 90 days or as specifically noted.   Attestation, signature or co-signature of physician indicates approval of certification requirements.    ________________________ ____________ __________  Physician Signature   Date   Time  [facility]  PHYSICAL THERAPY  [x] EVALUATION  [] DAILY NOTE (LAND) [] DAILY NOTE (AQUATIC ) [] PROGRESS NOTE [] DISCHARGE NOTE    [x] OUTPATIENT REHABILITATION [facility]   [] [place] [address]    [] [facility]   [] [facility]    Date: 2022  Patient Name:  Cat Todd  : 1970  MRN: [id number]  CSN: [id number]    Referring Practitioner KATHLEEN Sanders -*   Diagnosis Cervical spinal stenosis (M48.02)  degenerative lumbar spinal stenosis (m48.061)  cervical radiculopathy (M54.12)  trochanteric busitis of R hip (M70.61)   Treatment Diagnosis Pain in neck, pain in back, pain in R hip, decreased cervical ROM, decreased shoulder ROM, decreased strength in UE, decreased strength in LE, decreased core strength, poor posture    Date of Evaluation 22    Additional Pertinent History HTN, bipolar, diabetes, anxiety, arthritis       Functional Outcome Measure Used Modified Oswestry    Functional Outcome Score 28/50 (22)       Insurance: Primary: Payor: Montserrat Perdomo /  /  / ,   Secondary:    Authorization Information: PRE CERTIFICATION REQUIRED: NO  INSURANCE THERAPY BENEFIT:  NO VISIT LIMIT   AQUATIC THERAPY COVERED: YES  MODALITIES COVERED:  YES   Visit # 1, 1/10 for progress note   Visits Allowed: No visit limit   Recertification Date: 34   Physician Follow-Up:    Physician Orders:    History of Present Illness: Patient reports that he has had low back pain for years. Patient reports that he woke up one day lst month and he would barely walk at all with worsening of back and hip pain. Patient began to use crutches. Patient does have numbness in L LE. Patient reports that he also was diagnosed with R hip bursitis. Patient reports that his R hip is doing better now. Patient reports that his L arm also went numb. Patient had worsening of neck and arm pain. Patient has been seeing the chiropractor and he has had some of his sensation return. Patient reports that he has had this before with his arms and PT did improve symptoms. Patient has not had any recent imaging. Patient has not had any surgeries to neck, hip or back. Patient reports pain with sleeping, standing, walking, sitting, gripping, driving. Patient is dropping things out of L hand. SUBJECTIVE: Patient uses ice and also heat for his arm for temporary pain relief. Patient is no longer using AD. Social/Functional History and Current Status:  Medications and Allergies have been reviewed and are listed on Medical History Questionnaire. Darlin Reagan lives alone in a single story home with a basement with stairs and no handrail to enter.     Task Previous Current   ADLs  Independent Modified Independent   IADL's Independent Modified Independent   Ambulation Independent Independent   Transfers Independent Independent   Recreation Independent Dependent/Unable, 5to1    Community Integration Independent Independent   Driving Active  Active    Work On Disability  On Disability     Objective:    GENERAL   Pain Neck and L arm 7-8/10 pain  Back 6/10 pain  R hip 4/10 pain    Palpation Tenderness over lower cervical spinous process, L upper trap, Lower lumbar spinous process, Lateral hip L, B PSIS, R piriformis    Observation    Edema No edema noted    Accessory Motion Normal cervical accessory motion      NECK RANGE OF MOTION   Flexion 28   Extension 23   Rotation Right 78   Rotation Left 60   Sidebending Right 28   Sidebending Left 23   Retraction Restricted, painful, no change in symptoms    Protraction WFL   Neck Range of Motion is [facility]  []     UPPER EXTREMITY RANGE OF MOTION    Left Right Comments   Shoulder Flexion 131 131    Shoulder Extension [facility]    Shoulder Abduction 109 106    Shoulder Adduction [facility]    Shoulder External Rotation To base of occiput [facility]    Shoulder Internal Rotation To L iliac creat WFL    Shoulder Range of Motion is [facility]  []      Elbow Flexion      Elbow Extension      Elbow Range of Motion is [facility]  [x]     UPPER EXTREMITY STRENGTH    Left Right Comments   Shoulder Flexion 3- 3-    Shoulder Extension 4- 5    Shoulder Abduction 3- 3-    Shoulder External Rotation 3- 4+    Shoulder Internal Rotation 3- 4+    []  Shoulder Strength is grossly WFL. Elbow Flexion 4 5    Elbow Extension 4 5    [] Elbow Strength is grossly WFL.  Strength decreased L         SPECIAL TESTS (+/-)    Left Right Comments   Cerv. Compression + +    Cerv.  Distraction + + Improved pain and numbness    Vertebral Artery - -    Spurling's + -        POSTURE    No Deficit Deficit Comments   Forward Head  x    Rounded Shoulders  x    Kyphosis x     Lordosis  x    Slumped Sitting  x        TRUNK RANGE OF MOTION   Flexion: 25% limited   Extension: 75% limited, more pain   Lateral Flexion Left: WFL   Lateral Flexion Right: 50% limited   Rotation Left: WFL   Rotation Right: WFL   Trunk Range of Motion is [facility]  []     LOWER EXTREMITY RANGE OF MOTION    Left Right Comments   Hip Flexion knee to chest 80 90    Hip Extension [facility]    Hip ABDuction      Hip ADDuction [facility]    Hip Internal Rotation limited Limited     Hip External Rotation limited limited    Hip Range of Motion is [facility]  []      Knee Flexion      Knee Extension      Knee Range of Motion is [facility]  [x]       LOWER EXTREMITY STRENGTH    Left Right Comments   Hip Flexion 4- 5    Hip Abduction 4- 4+    Hip Extension 4 4    LE strength is WFL []      CORE STRENGTH   B SLR TEST:       8 seconds demonstrating decreased core strength       SPECIAL TESTS (+/-)    Left Right Comments   SLR  + -    90/90 Hamstring length 46 deg (+) 40 deg (+)    SKTC - -    DKTC - -    Slump + +    CRISELDA + +    FADIR + +    Lenka + +    Min - -    Ely + +      Prone elbow prop: improved numbness  Prone press ups: centralization of pain to back, numbness the same     TREATMENT   Precautions:    Pain: Neck and L arm 7-8/10 pain  Back 6/10 pain  R hip 4/10 pain     X in shaded column indicates activity completed today   Modalities Parameters/  Location  Notes                     Manual Therapy Time/Technique  Notes                     Exercise/Intervention   Notes                                                                                  Specific Interventions Next Treatment: NECK: chin tucks, upper trap/levator/pec stretching, SNAG retraction/extension/rotation, shoulder flexion and abduction AAROM, scapular strengthening, manual cervical distraction, modalities as needed. BACK: prone for centralization of symptoms, core strength, hip strength, HS/IT band/piriformis, hip flexor stretching, modalities as needed. Focus on neck/L arm more. Activity/Treatment Tolerance:  [x]  Patient tolerated treatment well  []  Patient limited by fatigue  []  Patient limited by pain   []  Patient limited by medical complications  []  Other:     Assessment: 47year old male presents with neck pain, back pain and hip pain secondary to stenosis, DDD, and bursitis. Patient has pain in neck, pain in back, pain in R hip, radicular symptoms, decreased cervical ROM, decreased shoulder ROM, decreased trunk ROM, decreased hip ROM, weakness in UE, weakness in core, weakness in LE, poor posture, and tightness throughout neck and back that limit his ability to drive and perform daily tasks.  Patient would benefit from skilled PT to improve pain, ROM, tissue extensibility, strength, posture and radicular symptoms to allow improved functional mobility. Patient does present with extension bias today with centralziation of back symptoms. Patient educated on benefit of PT and PT POC with patient in agreement. Body Structures/Functions/Activity Limitations: impaired activity tolerance, impaired ROM, impaired strength, pain and abnormal posture  Prognosis: good      Goals    Patient Goal: to improve pain and numbness in arms    Short Term Goals: 4 weeks  1. Patient will report decrease in pain to 5-6/10 at most to allow ease of ADLs and household tasks. 2. Patient will improve B cervical lateral flexion AROM to 35 degrees to allow decreased pain with household tasks. 3. Patient will improve L cervical rotation AROM to 70 degrees to allow decreased pain with turning head during driving. 4. Patient will improve B shoulder and elbow strength to 5/5 to allow ease of lifting. 5. Patient will improve trunk AROM to [facility] to allow ease of bending and lifting tasks. 6. Patient will improve B hip strength to 5/5 to allow ease of walking and daily tasks. 7. Patient will perform B SLR for 15 seconds to improve core strength needed for ease of standing tasks. 8. Patient will improve 90/90 hamstring length to 30 degrees B to allow decreased pain with standing/walking. Long Term Goals: 8 weeks  1. Patient will improve Modified Oswestry score from 28/50 to 18/50 to allow decrease in disability and improved functional mobility. 2. Patient will be independent with HEP in order to prevent re-injury and improve functional abilities. Patient Education:   [x]  HEP/Education Completed: Plan of Care, Goals, benefit of PT, attendance policy    [employer] [facility] Access Code:  []  No new Education completed  []  Reviewed Prior HEP      [x]  Patient verbalized and/or demonstrated understanding of education provided.   []  Patient unable to verbalize and/or demonstrate understanding of education provided. Will continue education. []  Barriers to learning:     PLAN:  Treatment Recommendations: Strengthening, Range of Motion, Manual Therapy - Soft Tissue Mobilization, Manual Therapy - Joint Manipulation, Pain Management, Home Exercise Program, Patient Education, Integrative Dry Needling, Aquatics and Modalities    [x]  Plan of care initiated. Plan to see patient 2 times per week for 8 weeks to address the treatment planned outlined above.   []  Continue with current plan of care  []  Modify plan of care as follows:    []  Hold pending physician visit  []  Discharge    Time In 0905   Time Out 0945   Timed Code Minutes: 0 min   Total Treatment Time: 40 min     Electronically Signed by: Ry Courtney PT

## 2022-06-07 ENCOUNTER — HOSPITAL ENCOUNTER (OUTPATIENT)
Dept: GENERAL RADIOLOGY | Age: 52
Discharge: HOME | End: 2022-06-07
Payer: MEDICARE

## 2022-06-07 ENCOUNTER — HOSPITAL ENCOUNTER (OUTPATIENT)
Age: 52
Discharge: HOME | End: 2022-06-07
Payer: MEDICARE

## 2022-06-07 ENCOUNTER — TELEPHONE (OUTPATIENT)
Dept: FAMILY MEDICINE CLINIC | Age: 52
End: 2022-06-07

## 2022-06-07 DIAGNOSIS — M25.552 BILATERAL HIP PAIN: ICD-10-CM

## 2022-06-07 DIAGNOSIS — M54.2 CERVICALGIA: ICD-10-CM

## 2022-06-07 DIAGNOSIS — M54.17 RADICULOPATHY, LUMBOSACRAL REGION: ICD-10-CM

## 2022-06-07 DIAGNOSIS — M25.562 PAIN IN BOTH KNEES, UNSPECIFIED CHRONICITY: ICD-10-CM

## 2022-06-07 DIAGNOSIS — M50.30 DDD (DEGENERATIVE DISC DISEASE), CERVICAL: ICD-10-CM

## 2022-06-07 DIAGNOSIS — G89.29 OTHER CHRONIC PAIN: ICD-10-CM

## 2022-06-07 DIAGNOSIS — M54.50 LOW BACK PAIN, UNSPECIFIED BACK PAIN LATERALITY, UNSPECIFIED CHRONICITY, UNSPECIFIED WHETHER SCIATICA PRESENT: ICD-10-CM

## 2022-06-07 DIAGNOSIS — M25.561 PAIN IN BOTH KNEES, UNSPECIFIED CHRONICITY: ICD-10-CM

## 2022-06-07 DIAGNOSIS — M25.519 SHOULDER PAIN, UNSPECIFIED CHRONICITY, UNSPECIFIED LATERALITY: ICD-10-CM

## 2022-06-07 DIAGNOSIS — M51.26 HERNIATED LUMBAR INTERVERTEBRAL DISC: ICD-10-CM

## 2022-06-07 DIAGNOSIS — M25.551 BILATERAL HIP PAIN: ICD-10-CM

## 2022-06-07 DIAGNOSIS — M48.061 DEGENERATIVE LUMBAR SPINAL STENOSIS: ICD-10-CM

## 2022-06-07 PROCEDURE — 72040 X-RAY EXAM NECK SPINE 2-3 VW: CPT

## 2022-06-07 PROCEDURE — 73560 X-RAY EXAM OF KNEE 1 OR 2: CPT

## 2022-06-07 PROCEDURE — 73030 X-RAY EXAM OF SHOULDER: CPT

## 2022-06-07 PROCEDURE — 72100 X-RAY EXAM L-S SPINE 2/3 VWS: CPT

## 2022-06-07 PROCEDURE — 72072 X-RAY EXAM THORAC SPINE 3VWS: CPT

## 2022-06-07 PROCEDURE — 73521 X-RAY EXAM HIPS BI 2 VIEWS: CPT

## 2022-06-07 RX ORDER — BACLOFEN 20 MG/1
20 TABLET ORAL 3 TIMES DAILY
Qty: 90 TABLET | Refills: 5 | Status: SHIPPED | OUTPATIENT
Start: 2022-06-07

## 2022-06-07 NOTE — TELEPHONE ENCOUNTER
Pt states his pain mgt dr chicho Conner thought his muscle relaxer needs increased  Dr Aaliyah Hartley- NP Alana Sutton states his back muscles are tense-he did get a injection in his back  Pharmacy R/A [facility]

## 2022-06-08 ENCOUNTER — HOSPITAL ENCOUNTER (OUTPATIENT)
Dept: PHYSICAL THERAPY | Age: 52
Setting detail: THERAPIES SERIES
Discharge: HOME | End: 2022-06-08
Payer: MEDICARE

## 2022-06-08 ENCOUNTER — OFFICE VISIT (OUTPATIENT)
Dept: FAMILY MEDICINE CLINIC | Age: 52
End: 2022-06-08
Payer: MEDICARE

## 2022-06-08 VITALS
WEIGHT: 298.6 LBS | DIASTOLIC BLOOD PRESSURE: 86 MMHG | SYSTOLIC BLOOD PRESSURE: 130 MMHG | HEART RATE: 112 BPM | RESPIRATION RATE: 16 BRPM | OXYGEN SATURATION: 97 % | HEIGHT: 71 IN | BODY MASS INDEX: 41.8 KG/M2 | TEMPERATURE: 97.5 F

## 2022-06-08 DIAGNOSIS — M54.12 CERVICAL RADICULOPATHY: ICD-10-CM

## 2022-06-08 DIAGNOSIS — M48.061 DEGENERATIVE LUMBAR SPINAL STENOSIS: Primary | ICD-10-CM

## 2022-06-08 DIAGNOSIS — M48.02 CERVICAL SPINAL STENOSIS: ICD-10-CM

## 2022-06-08 DIAGNOSIS — M51.26 HERNIATED LUMBAR INTERVERTEBRAL DISC: ICD-10-CM

## 2022-06-08 PROCEDURE — 97110 THERAPEUTIC EXERCISES: CPT

## 2022-06-08 PROCEDURE — 4004F PT TOBACCO SCREEN RCVD TLK: CPT | Performed by: NURSE PRACTITIONER

## 2022-06-08 PROCEDURE — 99214 OFFICE O/P EST MOD 30 MIN: CPT | Performed by: NURSE PRACTITIONER

## 2022-06-08 PROCEDURE — G8427 DOCREV CUR MEDS BY ELIG CLIN: HCPCS | Performed by: NURSE PRACTITIONER

## 2022-06-08 PROCEDURE — G8417 CALC BMI ABV UP PARAM F/U: HCPCS | Performed by: NURSE PRACTITIONER

## 2022-06-08 PROCEDURE — 3017F COLORECTAL CA SCREEN DOC REV: CPT | Performed by: NURSE PRACTITIONER

## 2022-06-08 ASSESSMENT — PATIENT HEALTH QUESTIONNAIRE - PHQ9
3. TROUBLE FALLING OR STAYING ASLEEP: 3
9. THOUGHTS THAT YOU WOULD BE BETTER OFF DEAD, OR OF HURTING YOURSELF: 0
4. FEELING TIRED OR HAVING LITTLE ENERGY: 3
8. MOVING OR SPEAKING SO SLOWLY THAT OTHER PEOPLE COULD HAVE NOTICED. OR THE OPPOSITE, BEING SO FIGETY OR RESTLESS THAT YOU HAVE BEEN MOVING AROUND A LOT MORE THAN USUAL: 0
1. LITTLE INTEREST OR PLEASURE IN DOING THINGS: 0
6. FEELING BAD ABOUT YOURSELF - OR THAT YOU ARE A FAILURE OR HAVE LET YOURSELF OR YOUR FAMILY DOWN: 0
2. FEELING DOWN, DEPRESSED OR HOPELESS: 1
SUM OF ALL RESPONSES TO PHQ QUESTIONS 1-9: 7
7. TROUBLE CONCENTRATING ON THINGS, SUCH AS READING THE NEWSPAPER OR WATCHING TELEVISION: 0
5. POOR APPETITE OR OVEREATING: 0
SUM OF ALL RESPONSES TO PHQ9 QUESTIONS 1 & 2: 1
SUM OF ALL RESPONSES TO PHQ QUESTIONS 1-9: 7

## 2022-06-08 NOTE — PROGRESS NOTES
[facility] Medicine at / [facility] [address] [facility] [address]ley [address]  Chief Complaint   Patient presents with    Follow-up     x-ray results        History obtained from chart review and the patient. SUBJECTIVE:  Elida Gomez is a 47 y.o. male that presents today for back and neck pain    Patient is following with pain management for his back and neck. He went to therapy today and he was hoping to use traction for his neck and back, but therapy said that they didn't have the traction ordered. He would like to possibly use this. His pain management doctor did recently obtain xrays of neck lumbar spine, knees, and shoulders    He is also seeing the chiropractor. He reports that the chiropractor did recommend he get updated MRI's. He does have chronic radicular pain and paresthesias down both legs. He also has pain going down arms and paresthesias down both arms.     Age/Gender Health Maintenance    Lipid -   No results found for: CHOL  No results found for: TRIG  Lab Results   Component Value Date    HDL 45 02/12/2021     Lab Results   Component Value Date    LDLCALC 106 02/12/2021     DM Screen -   Lab Results   Component Value Date    LABA1C 7.4 (H) 09/16/2021     Colon Cancer Screening - 50  Lung Cancer Screening (Age 54 to [de-identified] with 30 pack year hx, current smoker or quit within past 15 years) - 54    Tetanus - needs  Influenza Vaccine - yearly  Pneumonia Vaccine - 65  Zostavax - 50     PSA testing discussion - 55  AAA Screening - 65    Falls screening - n/a    Current Outpatient Medications   Medication Sig Dispense Refill    baclofen (LIORESAL) 20 MG tablet Take 1 tablet by mouth 3 times daily 90 tablet 5    metoprolol succinate (TOPROL XL) 50 MG extended release tablet take 1 tablet by mouth once daily 90 tablet 1    busPIRone (BUSPAR) 15 MG tablet take 1 tablet by mouth three times a day 270 tablet 1    metFORMIN (GLUCOPHAGE-XR) 500 MG extended release tablet take 1 tablet by mouth every morning with food 90 tablet 1    gabapentin (NEURONTIN) 800 MG tablet take 1 tablet by mouth four times a day 360 tablet 1    fluticasone (FLONASE) 50 MCG/ACT nasal spray 1 spray by Each Nostril route daily 32 g 1    hydroCHLOROthiazide (HYDRODIURIL) 12.5 MG tablet take 1 tablet by mouth once daily 90 tablet 3    oxyCODONE HCl (OXY-IR) 10 MG immediate release tablet take 1 tablet by mouth three times a day if needed for pain      Handicap Placard MISC by Does not apply route Expires 2/17/2026 1 each 0    Elastic Bandages & Supports (KNEE BRACE/HINGED/REGULAR) MISC 1 Device by Does not apply route daily 1 each 0    lamoTRIgine (LAMICTAL) 25 MG tablet       NARCAN 4 MG/0.1ML LIQD nasal spray       QUEtiapine (SEROQUEL) 100 MG tablet Take 100 mg by mouth nightly      vitamin E 400 UNIT capsule Take 1 capsule by mouth 2 times daily 60 capsule 3    oxyCODONE (ROXICODONE) 5 MG immediate release tablet 10 mg. (Patient not taking: Reported on 6/8/2022)       No current facility-administered medications for this visit. No orders of the defined types were placed in this encounter. All medications reviewed and reconciled, including OTC and herbal medications. Updated list given to patient.        Patient Active Problem List   Diagnosis    Herniated lumbar intervertebral disc    Primary osteoarthritis of right knee    Cervical spinal stenosis    Degenerative lumbar spinal stenosis    Bipolar affective disorder, currently depressed, mild (HCC)    Cigarette nicotine dependence without complication    Tachycardia    Essential hypertension    Anxiety    Dependent edema    Edema of both lower extremities due to peripheral venous insufficiency    Type 2 diabetes mellitus without complication, without long-term current use of insulin (HCC)    PVD (peripheral vascular disease) ([facility] [address]       Past Medical History:   Diagnosis Date    Bipolar disorder with depression ([facility] [address]     Cervical spinal stenosis     Cigarette nicotine dependence without complication 1/3/8745    Degenerative lumbar spinal stenosis 2021    Essential hypertension     Herniated lumbar intervertebral disc 2021    Primary osteoarthritis of right knee 2021    Tachycardia        Past Surgical History:   Procedure Laterality Date    KNEE SURGERY         Allergies   Allergen Reactions    Tylenol [Acetaminophen]     Celebrex [Celecoxib] Rash       Social History     Socioeconomic History    Marital status: Single     Spouse name: Not on file    Number of children: Not on file    Years of education: Not on file    Highest education level: Not on file   Occupational History    Not on file   Tobacco Use    Smoking status: Current Every Day Smoker     Packs/day: 1.00     Types: Cigarettes     Start date:      Last attempt to quit: 2021     Years since quittin.2    Smokeless tobacco: Never Used   Substance and Sexual Activity    Alcohol use: Never    Drug use: Never    Sexual activity: Not on file   Other Topics Concern    Not on file   Social History Narrative    Not on file     Social Determinants of Health     Financial Resource Strain:     Difficulty of Paying Living Expenses: Not on file   Food Insecurity:     Worried About Running Out of Food in the Last Year: Not on file    Lucy of Food in the Last Year: Not on file   Transportation Needs:     Lack of Transportation (Medical): Not on file    Lack of Transportation (Non-Medical):  Not on file   Physical Activity:     Days of Exercise per Week: Not on file    Minutes of Exercise per Session: Not on file   Stress:     Feeling of Stress : Not on file   Social Connections:     Frequency of Communication with Friends and Family: Not on file    Frequency of Social Gatherings with Friends and Family: Not on file    Attends Caodaism Services: Not on file    Active Member of Clubs or Organizations: Not on file    Attends Club or Organization Meetings: 09/01/2021    K 3.8 09/01/2021     09/01/2021    CO2 26 09/01/2021    BUN 6 (L) 09/01/2021    CREATININE 1.0 09/01/2021    GLUCOSE 139 (H) 09/01/2021    CALCIUM 9.1 09/01/2021    PROT 6.3 09/01/2021    LABALBU 3.8 09/01/2021    BILITOT 0.3 09/01/2021    ALKPHOS 72 09/01/2021    AST 55 (H) 09/01/2021    ALT 62 09/01/2021    LABGLOM 79 (A) 09/01/2021     Lab Results   Component Value Date    WBC 8.9 09/01/2021    HGB 16.9 09/01/2021    HCT 51.4 09/01/2021    MCV 92.6 09/01/2021     09/01/2021     Lab Results   Component Value Date    TSH 3.120 02/12/2021     Left shoulder xray 6/7/22  1. Degenerative change involving the acromioclavicular joint. 2. Otherwise negative left shoulder radiographs.         Right shoulder xray 6/7/22  Mild glenohumeral and acromioclavicular joint space narrowing and marginal spurring is observed. No fracture or dislocation is identified. No focal bony lesion is present.       The visualized soft tissues are unremarkable. Hip xrays 6/7/22  1. Degenerative change involving the right and left hip and sacroiliac joints. 2. Probable geode in the right acetabulum. 3. No fracture or other bony abnormality noted. .         Left knee xray 6/7/22  No fracture or acute bony abnormality noted in either knee. Moderate to severe chronic arthritic changes noted in the right knee and mild to moderate chronic arthritic changes noted in the left knee. Right knee xray 6/7/22  No fracture or acute bony abnormality noted in either knee. Moderate to severe chronic arthritic changes noted in the right knee and mild to moderate chronic arthritic changes noted in the left knee. Lumbar spine xrays 6/7/22      The lumbar vertebral bodies are normally aligned.  There are no compression fractures.  There is disc space narrowing especially at L5-S1 and L1-2. .   The facet joints are within normal limits.  No suspicious osseous lesions are present.  The sacrum and    sacroiliac joints appear normal.  No paraspinal abnormality is noted. Thoracic xray 6/7/22  1. Degenerative changes in the mid to lower thoracic spine with disc space narrowing and spurring. 2. No fracture or other bony abnormality noted. .     Cervical spine xray 6/7/22  1. Straightening of the normal cervical lordosis and degenerative change especially at C5-6 and C6-7.   2. C7 is poorly visualized on the lateral view. 3. Otherwise negative cervical spine series. .           PHYSICAL EXAM:  Vitals:    06/08/22 1417   BP: 130/86   Pulse: (!) 112   Resp: 16   Temp: 97.5 °F (36.4 °C)   TempSrc: Oral   SpO2: 97%   Weight: 298 lb 9.6 oz (135.4 kg)   Height: 5' 11\" (1.803 m)     Body mass index is 41.65 kg/m². VS Reviewed  General Appearance: A&O x 3, No acute distress,well developed and well- nourished  Head: normocephalic and atraumatic  Eyes: pupils equal, round, and reactive to light, extraocular eye movements intact, conjunctivae and eye lids without erythema  Neck: supple and non-tender without mass, no thyromegaly or thyroid nodules, no cervical lymphadenopathy  Pulmonary/Chest: clear to auscultation bilaterally- no wheezes, rales or rhonchi, normal air movement, no respiratory distress or retractions  Cardiovascular: S1 and S2 auscultated w/ RRR. No murmurs, rubs, clicks, or gallops, distal pulses intact. Abdomen: soft, non-tender, non-distended, bowl sounds physiologic,  no rebound or guarding, no masses or hernias noted. Liver and spleen without enlargement. Extremities: warm and dry, DP/PT 2+ bilaterally, trace edema  Musculoskeletal: No joint swelling or gross deformity   Neuro:  Alert, 5/5 strength globally and symmetrically  Psych: Affect appropriate. Mood normal. Thought process is normal without evidence of depression or psychosis. Good insight and appropriate interaction. Cognition and memory appear to be intact.   Skin: warm and dry, light brown pigmentation noted bilat lower extremities  Lymph:  No cervical, auricular or supraclavicular lymph nodes palpated    ASSESSMENT & PLAN  Saratha Osgood was seen today for follow-up. Diagnoses and all orders for this visit:    Degenerative lumbar spinal stenosis  -     MRI LUMBAR SPINE WO CONTRAST; Future    Herniated lumbar intervertebral disc  -     MRI LUMBAR SPINE WO CONTRAST; Future    Cervical spinal stenosis  -     MRI CERVICAL SPINE WO CONTRAST; Future    Cervical radiculopathy  -     MRI CERVICAL SPINE WO CONTRAST; Future      - reviewed xrays  - obtain MRI of cervical and lumbar spine  - will also reach out to PT, ok for traction from my standpoint    DISPOSITION    Return if symptoms worsen or fail to improve. Saratha Osgood released without restrictions. PATIENT COUNSELING    Counseling was provided today regarding the following topics: Healthy eating habits, Regular exercise, substance abuse and healthy sleep habits. Saratha Osgood received counseling on the following healthy behaviors: medication adherence and tobacco cessation    Patient given educational materials on: See Attached    I have instructed Saratha Osgood to complete a self tracking handout on none and instructed them to bring it with them to his next appointment. Barriers to learning and self management: none    Discussed use, benefit, and side effects of prescribed medications. Barriers to medication compliance addressed. All patient questions answered. Pt voiced understanding.        Electronically signed by KATHLEEN Aguirre CNP on 6/8/2022 at 2:40 PM

## 2022-06-08 NOTE — PROGRESS NOTES
[address] [facility]  PHYSICAL THERAPY  [] EVALUATION  [x] DAILY NOTE (LAND) [] DAILY NOTE (AQUATIC ) [] PROGRESS NOTE [] DISCHARGE NOTE    [x] [facility] [place]   [] [place] [address]    [] [facility]   [] Nancy Xiong    Date: 2022  Patient Name:  John Sendzuri  : 1970  MRN: [id number]  CSN: [id number]    Referring Practitioner KATHLEEN Núñez -*   Diagnosis Cervical spinal stenosis (M48.02)  degenerative lumbar spinal stenosis (m48.061)  cervical radiculopathy (M54.12)  trochanteric busitis of R hip (M70.61)   Treatment Diagnosis Pain in neck, pain in back, pain in R hip, decreased cervical ROM, decreased shoulder ROM, decreased strength in UE, decreased strength in LE, decreased core strength, poor posture    Date of Evaluation 22    Additional Pertinent History HTN, bipolar, diabetes, anxiety, arthritis       Functional Outcome Measure Used Modified Oswestry    Functional Outcome Score 28/50 (22)       Insurance: Primary: Payor: Ramesh Fiore /  /  / ,   Secondary:    Authorization Information: PRE CERTIFICATION REQUIRED: NO  INSURANCE THERAPY BENEFIT:  NO VISIT LIMIT   AQUATIC THERAPY COVERED: YES  MODALITIES COVERED:  YES   Visit # 2, 2/10 for progress note   Visits Allowed: No visit limit   Recertification Date: 23   Physician Follow-Up:    Physician Orders:    History of Present Illness: Patient reports that he has had low back pain for years. Patient reports that he woke up one day lst month and he would barely walk at all with worsening of back and hip pain. Patient began to use crutches. Patient does have numbness in L LE. Patient reports that he also was diagnosed with R hip bursitis. Patient reports that his R hip is doing better now. Patient reports that his L arm also went numb. Patient had worsening of neck and arm pain. Patient has been seeing the chiropractor and he has had some of his sensation return. Patient reports that he has had this before with his arms and PT did improve symptoms. Patient has not had any recent imaging. Patient has not had any surgeries to neck, hip or back. Patient reports pain with sleeping, standing, walking, sitting, gripping, driving. Patient is dropping things out of L hand. SUBJECTIVE: Patient has a chiropractor appointment later this morning. Patient states the symptoms in Left arm of numbness is always there. TREATMENT   Precautions:    Pain: Neck and L arm 7/10 pain  Lower Back 5/10 pain  R leg 4/10 pain; L leg 6/10     X in shaded column indicates activity completed today   Modalities Parameters/  Location  Notes                     Manual Therapy Time/Technique  Notes   Manual cervical distraction   ADD NEXT SESSION as tolerated               Exercise/Intervention   Notes   Supine chin tuck 5x 2 sec X Increased neck pain so stopped   Supine cervical rotation 10x  X    Shoulder flexion and abduction AAROM with wand 10x  X    Gentle upper trap/levator stretches 3x 10 sec  X    SNAG retraction, extension 10x  X Decreased Left hand tingling          Hooklying gentle abdominal bracing, glute sets 10x 3 sec  X                  Use of towel in pillow for cervical support   X    Towel roll in seated for lumbar support   X    Education on posture correction and awareness   X      Specific Interventions Next Treatment: NECK: chin tucks, upper trap/levator/pec stretching, SNAG retraction/extension/rotation, shoulder flexion and abduction AAROM, scapular strengthening, manual cervical distraction, modalities as needed. BACK: prone for centralization of symptoms, core strength, hip strength, HS/IT band/piriformis, hip flexor stretching, modalities as needed. Focus on neck/L arm more.     Activity/Treatment Tolerance:  [x]  Patient tolerated treatment well []  Patient limited by fatigue  []  Patient limited by pain   []  Patient limited by medical complications  []  Other: Assessment: Patient is very limited with tolerance of activities. Focused session on Neck and Left arm symptoms today. Patient noted that hooklying position today during neck activities decreased low back pain \"slightly\". Multiple cues provided for proper muscle activation and techniques with exercises. Patient voiced decreased intensity of numbness and tingling in Left UE after SNAG techniques. Goals    Patient Goal: to improve pain and numbness in arms    Short Term Goals: 4 weeks  1. Patient will report decrease in pain to 5-6/10 at most to allow ease of ADLs and household tasks. 2. Patient will improve B cervical lateral flexion AROM to 35 degrees to allow decreased pain with household tasks. 3. Patient will improve L cervical rotation AROM to 70 degrees to allow decreased pain with turning head during driving. 4. Patient will improve B shoulder and elbow strength to 5/5 to allow ease of lifting. 5. Patient will improve trunk AROM to [facility] to allow ease of bending and lifting tasks. 6. Patient will improve B hip strength to 5/5 to allow ease of walking and daily tasks. 7. Patient will perform B SLR for 15 seconds to improve core strength needed for ease of standing tasks. 8. Patient will improve 90/90 hamstring length to 30 degrees B to allow decreased pain with standing/walking. Long Term Goals: 8 weeks  1. Patient will improve Modified Oswestry score from 28/50 to 18/50 to allow decrease in disability and improved functional mobility. 2. Patient will be independent with HEP in order to prevent re-injury and improve functional abilities. Patient Education:   [x]  HEP/Education Completed: Abdominal bracing with daily activities. Posture. Provide HO of HEP at next session.  [employer] Access Code:  []  No new Education completed  []  Reviewed Prior HEP      [x]  Patient verbalized and/or demonstrated understanding of education provided.   []  Patient unable to verbalize and/or demonstrate understanding of education provided. Will continue education. []  Barriers to learning:     PLAN:  Treatment Recommendations: Strengthening, Range of Motion, Manual Therapy - Soft Tissue Mobilization, Manual Therapy - Joint Manipulation, Pain Management, Home Exercise Program, Patient Education, Integrative Dry Needling, Aquatics and Modalities    []  Plan of care initiated. Plan to see patient 2 times per week for 8 weeks to address the treatment planned outlined above.   [x]  Continue with current plan of care  []  Modify plan of care as follows:    []  Hold pending physician visit  []  Discharge    Time In 0800   Time Out 0845   Timed Code Minutes: 45 min   Total Treatment Time: 45 min     Electronically Signed by: Catherine Garzon PTA

## 2022-06-09 ENCOUNTER — TELEPHONE (OUTPATIENT)
Dept: FAMILY MEDICINE CLINIC | Age: 52
End: 2022-06-09

## 2022-06-09 NOTE — TELEPHONE ENCOUNTER
scheduling MRI cervical / lumbar 07/06/22 @ [facility]   Arrive @ 5:30 pm to 1st floor radiology   Wear mask ,bring ID and Insurance cards     No Metal, No Jewelry

## 2022-06-09 NOTE — TELEPHONE ENCOUNTER
PATIENT REQUESTS TO HAVE MRI DONE AT [facility]   ORDERS FAXED  [phone]  PATIENT WILL CALL TO GET SCHEDULED AND WILL CANCEL CURRENT APPT WITH [facility] & [address]

## 2022-06-14 DIAGNOSIS — M48.061 DEGENERATIVE LUMBAR SPINAL STENOSIS: ICD-10-CM

## 2022-06-14 DIAGNOSIS — M51.26 HERNIATED LUMBAR INTERVERTEBRAL DISC: ICD-10-CM

## 2022-06-14 RX ORDER — GABAPENTIN 800 MG/1
TABLET ORAL
Qty: 360 TABLET | Refills: 1 | Status: SHIPPED | OUTPATIENT
Start: 2022-06-14 | End: 2022-12-11

## 2022-06-15 ENCOUNTER — HOSPITAL ENCOUNTER (OUTPATIENT)
Dept: PHYSICAL THERAPY | Age: 52
Setting detail: THERAPIES SERIES
Discharge: HOME | End: 2022-06-15
Payer: MEDICARE

## 2022-06-15 PROCEDURE — 97012 MECHANICAL TRACTION THERAPY: CPT

## 2022-06-15 PROCEDURE — 97110 THERAPEUTIC EXERCISES: CPT

## 2022-06-15 NOTE — PROGRESS NOTES
that he has had this before with his arms and PT did improve symptoms. Patient has not had any recent imaging. Patient has not had any surgeries to neck, hip or back. Patient reports pain with sleeping, standing, walking, sitting, gripping, driving. Patient is dropping things out of L hand. SUBJECTIVE: Patient reporting neck pain 7-8/10 with pain going down into left side all the way down to fingers. Having numbness and tingling. Back 5-6/10 with pain 6-7/10 down left leg and right 4-5/10. Did take pain medication before coming. Patient reporting feel like all of his joints are stiff today. Primary PT spoke with patient MD and MD wanting to trial traction. TREATMENT   Precautions:    Pain: Neck and L arm 7-8/10 pain radiating pain down left side to fingers  Lower Back 5/10 pain  R leg 4/10 pain; L leg 6/10     X in shaded column indicates activity completed today   Modalities Parameters/  Location  Notes                     Manual Therapy Time/Technique  Notes   Manual cervical distraction   ADD NEXT SESSION as tolerated   Mechanical Traction (Cervical)  10 minutes 12# max, 5# min. Intermittent. 60 on 20 off. Rope speed 100%. Started at 10 pounds with pt not feeling much so then increased to 12 x Bolster behind knees for LBP. Pt reporting feeling a slight pull during treatment. No changes in pain but reporting that he did not feel as tight following.          Exercise/Intervention   Notes   Supine chin tuck 5x 2 sec  Increased neck pain so stopped   Supine cervical rotation 10x  X    Shoulder flexion and abduction AAROM with wand 10x      Gentle upper trap/levator stretches 3x 10 sec  X    SNAG retraction, extension 10x  X Decreased Left hand tingling          Hook lying gentle abdominal bracing 10x 3 sec  X Bolster behind knees                 Use of towel in pillow for cervical support       Towel roll in seated for lumbar support       Education on posture correction and awareness         Specific Interventions Next Treatment: NECK: chin tucks, upper trap/levator/pec stretching, SNAG retraction/extension/rotation, shoulder flexion and abduction AAROM, scapular strengthening, manual cervical distraction, modalities as needed. BACK: prone for centralization of symptoms, core strength, hip strength, HS/IT band/piriformis, hip flexor stretching, modalities as needed. Focus on neck/L arm more. Activity/Treatment Tolerance:  [x]  Patient tolerated treatment well []  Patient limited by fatigue  []  Patient limited by pain   []  Patient limited by medical complications  []  Other:     Assessment: Initiated traction today with patient tolerating well. Did not have much change in symptoms/pain following but felt like he had better motion. Will continue cervical traction and monitor. Neck pain 7/10 with pain 7/10 down left arm. Low back pain 5-6/10. Goals    Patient Goal: to improve pain and numbness in arms    Short Term Goals: 4 weeks  1. Patient will report decrease in pain to 5-6/10 at most to allow ease of ADL's and household tasks. 2. Patient will improve B cervical lateral flexion AROM to 35 degrees to allow decreased pain with household tasks. 3. Patient will improve L cervical rotation AROM to 70 degrees to allow decreased pain with turning head during driving. 4. Patient will improve B shoulder and elbow strength to 5/5 to allow ease of lifting. 5. Patient will improve trunk AROM to WFL's to allow ease of bending and lifting tasks. 6. Patient will improve B hip strength to 5/5 to allow ease of walking and daily tasks. 7. Patient will perform B SLR for 15 seconds to improve core strength needed for ease of standing tasks. 8. Patient will improve 90/90 hamstring length to 30 degrees B to allow decreased pain with standing/walking. Long Term Goals: 8 weeks  1. Patient will improve Modified Oswestry score from 28/50 to 18/50 to allow decrease in disability and improved functional mobility.   2. Patient will be independent with HEP in order to prevent re-injury and improve functional abilities. Patient Education:   [x]  HEP/Education Completed: monitor response to traction   Medbridge Access Code:  []  No new Education completed  []  Reviewed Prior HEP      [x]  Patient verbalized and/or demonstrated understanding of education provided. []  Patient unable to verbalize and/or demonstrate understanding of education provided. Will continue education. []  Barriers to learning:     PLAN:  Treatment Recommendations: Strengthening, Range of Motion, Manual Therapy - Soft Tissue Mobilization, Manual Therapy - Joint Manipulation, Pain Management, Home Exercise Program, Patient Education, Integrative Dry Needling, Aquatics and Modalities      Plan to see patient 2 times per week for 8 weeks to address the treatment planned outlined above.   [x]  Continue with current plan of care  []  Modify plan of care as follows:    []  Hold pending physician visit  []  Discharge    Time In 0950   Time Out 1022   Timed Code Minutes: 20 min   Total Treatment Time: 32 min     Electronically Signed by: Gayla Chapman PTA

## 2022-06-20 ENCOUNTER — HOSPITAL ENCOUNTER (OUTPATIENT)
Dept: PHYSICAL THERAPY | Age: 52
Setting detail: THERAPIES SERIES
Discharge: HOME | End: 2022-06-20
Payer: MEDICARE

## 2022-06-20 ENCOUNTER — TELEPHONE (OUTPATIENT)
Dept: FAMILY MEDICINE CLINIC | Age: 52
End: 2022-06-20

## 2022-06-20 PROCEDURE — 97012 MECHANICAL TRACTION THERAPY: CPT

## 2022-06-20 PROCEDURE — 97110 THERAPEUTIC EXERCISES: CPT

## 2022-06-20 NOTE — PROGRESS NOTES
[address] [facility]  PHYSICAL THERAPY  [x] DAILY NOTE (LAND) [] DAILY NOTE (AQUATIC ) [] PROGRESS NOTE [] DISCHARGE NOTE    [x] [facility] [place]   [] [place] [address]    [] [facility]   [] [facility]    Date: 2022  Patient Name:  Cat Todd  : 1970  MRN: [id number]  CSN: [id number]    Referring Practitioner KATHLEEN Sanders -*   Diagnosis Cervical spinal stenosis (M48.02)  degenerative lumbar spinal stenosis (m48.061)  cervical radiculopathy (M54.12)  trochanteric bursitis of R hip (M70.61)   Treatment Diagnosis Pain in neck, pain in back, pain in R hip, decreased cervical ROM, decreased shoulder ROM, decreased strength in UE, decreased strength in LE, decreased core strength, poor posture    Date of Evaluation 22    Additional Pertinent History HTN, bipolar, diabetes, anxiety, arthritis       Functional Outcome Measure Used Modified Oswestry    Functional Outcome Score 28/50 (22)       Insurance: Primary: Payor: Montserrat Perdomo /  /  / ,   Secondary:    Authorization Information: PRE CERTIFICATION REQUIRED: NO  INSURANCE THERAPY BENEFIT:  NO VISIT LIMIT   AQUATIC THERAPY COVERED: YES  MODALITIES COVERED:  YES   Visit # 4, 4/10 for progress note   Visits Allowed: No visit limit   Recertification Date: 24   Physician Follow-Up:    Physician Orders:    History of Present Illness: Patient reports that he has had low back pain for years. Patient reports that he woke up one day lst month and he would barely walk at all with worsening of back and hip pain. Patient began to use crutches. Patient does have numbness in L LE. Patient reports that he also was diagnosed with R hip bursitis. Patient reports that his R hip is doing better now. Patient reports that his L arm also went numb. Patient had worsening of neck and arm pain. Patient has been seeing the chiropractor and he has had some of his sensation return.  Patient reports that he has had this before with his arms and PT did improve symptoms. Patient has not had any recent imaging. Patient has not had any surgeries to neck, hip or back. Patient reports pain with sleeping, standing, walking, sitting, gripping, driving. Patient is dropping things out of L hand. SUBJECTIVE: Patient reporting 5/10 pain in neck and back upon arrival. Patient reports that the tingling and numbness in his arms is worse today. Patient does report improved pain and radicular symptoms with use of traction last session with improved symptoms for 2 days. TREATMENT   Precautions:    Pain: Neck and L arm 5/10 pain radiating pain down left side to fingers  Lower Back 5/10 pain     X in shaded column indicates activity completed today   Modalities Parameters/  Location  Notes                     Manual Therapy Time/Technique  Notes   Mechanical Traction (Cervical)  10 minutes 18# max, 5# min. Intermittent. 60 on 20 off. Rope speed 100%. 20 deg angle of traction unit  5 minutes for set up  x Bolster behind knees for LBP. Exercise/Intervention   Notes   Supine chin tuck 3x 5 sec X Did have increase in neck pain and arm pain, continued to hold after 3 reps   Supine cervical rotation 10x  X    Shoulder flexion and abduction AAROM with wand 10x      Gentle upper trap/levator stretches 3x 10 sec  X    SNAG retraction, extension 6-10x  X Decreased Left hand tingling          Hook lying gentle abdominal bracing 10x 3 sec  X Bolster behind knees                 Use of towel in pillow for cervical support       Towel roll in seated for lumbar support       Education on posture correction and awareness         Specific Interventions Next Treatment: NECK: chin tucks, upper trap/levator/pec stretching, SNAG retraction/extension/rotation, shoulder flexion and abduction AAROM, scapular strengthening, manual cervical distraction, modalities as needed.  BACK: prone for centralization of symptoms, core strength, hip strength, HS/IT band/piriformis, hip flexor stretching, modalities as needed. Focus on neck/L arm more. Activity/Treatment Tolerance:  [x]  Patient tolerated treatment well  []  Patient limited by fatigue  []  Patient limited by pain   []  Patient limited by medical complications  []  Other:     Assessment: Continued mechanical traction cervical this date. Patient reports no change immediately after traction in pain or radicular symptoms. Patient is difficult to progress due to pain in neck and arms. Patient does continue to have pain with small range supine chin tucks, continued to hold this exercise. Patient reports increase in pain after the session. Continue to monitor response to pain. Will progress patient as able to improve functional mobility. Goals    Patient Goal: to improve pain and numbness in arms    Short Term Goals: 4 weeks  1. Patient will report decrease in pain to 5-6/10 at most to allow ease of ADL's and household tasks. 2. Patient will improve B cervical lateral flexion AROM to 35 degrees to allow decreased pain with household tasks. 3. Patient will improve L cervical rotation AROM to 70 degrees to allow decreased pain with turning head during driving. 4. Patient will improve B shoulder and elbow strength to 5/5 to allow ease of lifting. 5. Patient will improve trunk AROM to WFL's to allow ease of bending and lifting tasks. 6. Patient will improve B hip strength to 5/5 to allow ease of walking and daily tasks. 7. Patient will perform B SLR for 15 seconds to improve core strength needed for ease of standing tasks. 8. Patient will improve 90/90 hamstring length to 30 degrees B to allow decreased pain with standing/walking. Long Term Goals: 8 weeks  1. Patient will improve Modified Oswestry score from 28/50 to 18/50 to allow decrease in disability and improved functional mobility.   2. Patient will be independent with HEP in order to prevent re-injury and improve functional abilities. Patient Education:   [x]  HEP/Education Completed: continue HEP    Medbridge Access Code:  []  No new Education completed  []  Reviewed Prior HEP      [x]  Patient verbalized and/or demonstrated understanding of education provided. []  Patient unable to verbalize and/or demonstrate understanding of education provided. Will continue education. []  Barriers to learning:     PLAN:  Treatment Recommendations: Strengthening, Range of Motion, Manual Therapy - Soft Tissue Mobilization, Manual Therapy - Joint Manipulation, Pain Management, Home Exercise Program, Patient Education, Integrative Dry Needling, Aquatics and Modalities      Plan to see patient 2 times per week for 8 weeks to address the treatment planned outlined above.   [x]  Continue with current plan of care  []  Modify plan of care as follows:    []  Hold pending physician visit  []  Discharge    Time In 0940   Time Out 1015   Timed Code Minutes: 20 min   Total Treatment Time: 35 min     Electronically Signed by: Frandy Lyle PT

## 2022-06-20 NOTE — TELEPHONE ENCOUNTER
----- Message from KATHLEEN Musa - CNP sent at 6/17/2022 12:01 PM EDT -----  Let Alejandro Dan know his MRI of his cervical spine does show some mild stenosis of the spinal canal, particularly at C5-C6 and C6-C7.  He also has a small bulging disc at C6-C7, otherwise his MRI is normal.

## 2022-06-20 NOTE — TELEPHONE ENCOUNTER
Argentina Solders, APRN - CNP  P [facility] Clinical Support  Let Artist Shelly know the MRI of his lumbar spine is normal.

## 2022-06-22 ENCOUNTER — HOSPITAL ENCOUNTER (OUTPATIENT)
Dept: PHYSICAL THERAPY | Age: 52
Setting detail: THERAPIES SERIES
Discharge: HOME | End: 2022-06-22
Payer: MEDICARE

## 2022-06-22 PROCEDURE — 97110 THERAPEUTIC EXERCISES: CPT

## 2022-06-22 PROCEDURE — 97012 MECHANICAL TRACTION THERAPY: CPT

## 2022-06-27 ENCOUNTER — HOSPITAL ENCOUNTER (OUTPATIENT)
Dept: PHYSICAL THERAPY | Age: 52
Setting detail: THERAPIES SERIES
Discharge: HOME | End: 2022-06-27
Payer: MEDICARE

## 2022-06-27 PROCEDURE — 97110 THERAPEUTIC EXERCISES: CPT

## 2022-06-27 PROCEDURE — 97012 MECHANICAL TRACTION THERAPY: CPT

## 2022-06-27 NOTE — PROGRESS NOTES
[address] [facility]  PHYSICAL THERAPY  [x] DAILY NOTE (LAND) [] DAILY NOTE (AQUATIC ) [] PROGRESS NOTE [] DISCHARGE NOTE    [x] [facility] [place]   [] [place] [address]    [] [facility]   [] Beti Fung    Date: 2022  Patient Name:  Justin Taveras  : 1970  MRN: [id number]  CSN: [id number]    Referring Practitioner KATHLEEN Hall -*   Diagnosis Cervical spinal stenosis (M48.02)  degenerative lumbar spinal stenosis (m48.061)  cervical radiculopathy (M54.12)  trochanteric bursitis of R hip (M70.61)   Treatment Diagnosis Pain in neck, pain in back, pain in R hip, decreased cervical ROM, decreased shoulder ROM, decreased strength in UE, decreased strength in LE, decreased core strength, poor posture    Date of Evaluation 22    Additional Pertinent History HTN, bipolar, diabetes, anxiety, arthritis       Functional Outcome Measure Used Modified Oswestry    Functional Outcome Score 28/50 (22)       Insurance: Primary: Payor: Tulio Motta /  /  / ,   Secondary:    Authorization Information: PRE CERTIFICATION REQUIRED: NO  INSURANCE THERAPY BENEFIT:  NO VISIT LIMIT   AQUATIC THERAPY COVERED: YES  MODALITIES COVERED:  YES   Visit # 6, 6/10 for progress note   Visits Allowed: No visit limit   Recertification Date: 73   Physician Follow-Up:    Physician Orders:    History of Present Illness: Patient reports that he has had low back pain for years. Patient reports that he woke up one day lst month and he would barely walk at all with worsening of back and hip pain. Patient began to use crutches. Patient does have numbness in L LE. Patient reports that he also was diagnosed with R hip bursitis. Patient reports that his R hip is doing better now. Patient reports that his L arm also went numb. Patient had worsening of neck and arm pain. Patient has been seeing the chiropractor and he has had some of his sensation return.  Patient reports that he has had this before with his arms and PT did improve symptoms. Patient has not had any recent imaging. Patient has not had any surgeries to neck, hip or back. Patient reports pain with sleeping, standing, walking, sitting, gripping, driving. Patient is dropping things out of L hand. SUBJECTIVE: Patient presenting with 5/10 neck pain, continued tingling down L arm, and 4/10 LBP. Pt does note that symptoms do seem to be improving. TREATMENT   Precautions:    Pain: Neck and L arm 5/10 pain radiating pain down left side to fingers  Lower Back 4/10 pain     X in shaded column indicates activity completed today   Modalities Parameters/  Location  Notes                     Manual Therapy Time/Technique  Notes   Mechanical Traction (Cervical)  12 minutes 20# max, 9# min. Intermittent. 60 on 20 off. Rope speed 100%. 20 deg angle of traction unit  3 minutes for set up  x Improved pain to 3-4/10         Exercise/Intervention   Notes   Supine chin tuck 3x 5 sec  Did have increase in neck pain and arm pain, continued to hold after 3 reps   Supine cervical rotation 10x  X    Seated Scapular retractions* 10x 3\"  X    Shoulder flexion and abduction AAROM with wand 10x  X    Gentle upper trap/levator stretches 3x 10 sec  X    SNAG retraction, extension 10*  X    Seated cervical retraction with therapist overpressure  2x10  x    Seated cervical retraction by patient       Hook lying gentle abdominal bracing 10x 3 sec  X Bolster behind knees   Abdominal bracing with SAQ  10x5s  x           Use of towel in pillow for cervical support       Towel roll in seated for lumbar support       Education on posture correction and awareness         Specific Interventions Next Treatment: NECK: chin tucks, upper trap/levator/pec stretching, SNAG retraction/extension/rotation, shoulder flexion and abduction AAROM, scapular strengthening, manual cervical distraction, modalities as needed.  BACK: prone for centralization of symptoms, core strength, hip strength, HS/IT band/piriformis, hip flexor stretching, modalities as needed. Focus on neck/L arm more. Activity/Treatment Tolerance:  [x]  Patient tolerated treatment well  []  Patient limited by fatigue  []  Patient limited by pain   []  Patient limited by medical complications  []  Other:     Assessment: Patient able to tolerate addition of light strengthening exercises this date with no increase in pain. Did have increased pain and tingling down R arm with cervical rotation to the R and limited range of cervical rotation noted to both L and R.     Goals    Patient Goal: to improve pain and numbness in arms    Short Term Goals: 4 weeks  1. Patient will report decrease in pain to 5-6/10 at most to allow ease of ADL's and household tasks. 2. Patient will improve B cervical lateral flexion AROM to 35 degrees to allow decreased pain with household tasks. 3. Patient will improve L cervical rotation AROM to 70 degrees to allow decreased pain with turning head during driving. 4. Patient will improve B shoulder and elbow strength to 5/5 to allow ease of lifting. 5. Patient will improve trunk AROM to WFL's to allow ease of bending and lifting tasks. 6. Patient will improve B hip strength to 5/5 to allow ease of walking and daily tasks. 7. Patient will perform B SLR for 15 seconds to improve core strength needed for ease of standing tasks. 8. Patient will improve 90/90 hamstring length to 30 degrees B to allow decreased pain with standing/walking. Long Term Goals: 8 weeks  1. Patient will improve Modified Oswestry score from 28/50 to 18/50 to allow decrease in disability and improved functional mobility. 2. Patient will be independent with HEP in order to prevent re-injury and improve functional abilities.     Patient Education:   [x]  HEP/Education Completed: Reviewed HEP stretching program  [email or web address] Access Code:  []  No new Education completed  [x]  Reviewed Prior HEP      [x]  Patient verbalized and/or demonstrated understanding of education provided. []  Patient unable to verbalize and/or demonstrate understanding of education provided. Will continue education. []  Barriers to learning:     PLAN:  Treatment Recommendations: Strengthening, Range of Motion, Manual Therapy - Soft Tissue Mobilization, Manual Therapy - Joint Manipulation, Pain Management, Home Exercise Program, Patient Education, Integrative Dry Needling, Aquatics and Modalities      Plan to see patient 2 times per week for 8 weeks to address the treatment planned outlined above.   [x]  Continue with current plan of care  []  Modify plan of care as follows:    []  Hold pending physician visit  []  Discharge    Time In 0930   Time Out 1013   Timed Code Minutes: 28 min   Total Treatment Time: 43 min     Electronically Signed by: Felisha Rendon PTA

## 2022-06-28 DIAGNOSIS — I10 ESSENTIAL HYPERTENSION: ICD-10-CM

## 2022-06-28 DIAGNOSIS — R60.9 DEPENDENT EDEMA: ICD-10-CM

## 2022-06-29 ENCOUNTER — HOSPITAL ENCOUNTER (OUTPATIENT)
Dept: PHYSICAL THERAPY | Age: 52
Setting detail: THERAPIES SERIES
Discharge: HOME | End: 2022-06-29
Payer: MEDICARE

## 2022-06-29 PROCEDURE — 97012 MECHANICAL TRACTION THERAPY: CPT

## 2022-06-29 PROCEDURE — 97110 THERAPEUTIC EXERCISES: CPT

## 2022-06-29 RX ORDER — HYDROCHLOROTHIAZIDE 12.5 MG/1
TABLET ORAL
Qty: 90 TABLET | Refills: 3 | Status: SHIPPED | OUTPATIENT
Start: 2022-06-29

## 2022-06-29 NOTE — PROGRESS NOTES
[address] [facility]  PHYSICAL THERAPY  [x] DAILY NOTE (LAND) [] DAILY NOTE (AQUATIC ) [] PROGRESS NOTE [] DISCHARGE NOTE    [x] OUT[facility] [place]   [] [place] [address]    [] [facility]   [] [facility]    Date: 2022  Patient Name:  Cat Todd  : 1970  MRN: [id number]  CSN: [id number]    Referring Practitioner KATHLEEN Sanders -*   Diagnosis Cervical spinal stenosis (M48.02)  degenerative lumbar spinal stenosis (m48.061)  cervical radiculopathy (M54.12)  trochanteric bursitis of R hip (M70.61)   Treatment Diagnosis Pain in neck, pain in back, pain in R hip, decreased cervical ROM, decreased shoulder ROM, decreased strength in UE, decreased strength in LE, decreased core strength, poor posture    Date of Evaluation 22    Additional Pertinent History HTN, bipolar, diabetes, anxiety, arthritis       Functional Outcome Measure Used Modified Oswestry    Functional Outcome Score 28/50 (22)       Insurance: Primary: Payor: Montserrat Perdomo /  /  / ,   Secondary:    Authorization Information: PRE CERTIFICATION REQUIRED: NO  INSURANCE THERAPY BENEFIT:  NO VISIT LIMIT   AQUATIC THERAPY COVERED: YES  MODALITIES COVERED:  YES   Visit # 7, 7/10 for progress note   Visits Allowed: No visit limit   Recertification Date: 39   Physician Follow-Up:    Physician Orders:    History of Present Illness: Patient reports that he has had low back pain for years. Patient reports that he woke up one day lst month and he would barely walk at all with worsening of back and hip pain. Patient began to use crutches. Patient does have numbness in L LE. Patient reports that he also was diagnosed with R hip bursitis. Patient reports that his R hip is doing better now. Patient reports that his L arm also went numb. Patient had worsening of neck and arm pain. Patient has been seeing the chiropractor and he has had some of his sensation return.  Patient reports that he has had this before with his arms and PT did improve symptoms. Patient has not had any recent imaging. Patient has not had any surgeries to neck, hip or back. Patient reports pain with sleeping, standing, walking, sitting, gripping, driving. Patient is dropping things out of L hand. SUBJECTIVE: Patient reporting neck pain 5/10 with N/T down arm 5/10. Back 6/10 with pain radiating down L>R 6/10. Patient does feel like cervical traction is helping. TREATMENT   Precautions:    Pain: Neck and L arm 5/10 pain radiating pain down left side to fingers  Lower Back 4/10 pain     X in shaded column indicates activity completed today   Modalities Parameters/  Location  Notes                     Manual Therapy Time/Technique  Notes   Mechanical Traction (Cervical)  12 minutes 20# max, 9# min. Static Rope speed 100%.  20 deg angle of traction unit  3 minutes for set up  x Improved pain to 4/10         Exercise/Intervention   Notes   Supine chin tuck 3x 5 sec  Did have increase in neck pain and arm pain, continued to hold after 3 reps   Supine cervical rotation 10x  X    Seated Scapular retractions* 10x 3\"  X    Shoulder flexion and abduction AAROM with wand 10x  X    Gentle upper trap/levator stretches 3x 10 sec  x    SNAG retraction, extension 10*  X Extension increased numbness so only doing 6   Seated cervical retraction with therapist overpressure  3x10  x No change   Seated cervical retraction by patient       Hook lying gentle abdominal bracing 10x 3 sec  X Bolster behind knees   Abdominal bracing with SAQ  10x5s  x           Use of towel in pillow for cervical support       Towel roll in seated for lumbar support       Education on posture correction and awareness         Specific Interventions Next Treatment: NECK: chin tucks, upper trap/levator/pec stretching, SNAG retraction/extension/rotation, shoulder flexion and abduction AAROM, scapular strengthening, manual cervical distraction, modalities as needed. BACK: prone for centralization of symptoms, core strength, hip strength, HS/IT band/piriformis, hip flexor stretching, modalities as needed. Focus on neck/L arm more. Activity/Treatment Tolerance:  [x]  Patient tolerated treatment well  []  Patient limited by fatigue  []  Patient limited by pain   []  Patient limited by medical complications  []  Other:     Assessment:Switch mechanical traction from intermittent to static with patient reporting that he thought it did feel a little better. Neck pain tight and 4/10 with N/T 4/10. LBP \"about the same\". Goals    Patient Goal: to improve pain and numbness in arms    Short Term Goals: 4 weeks  1. Patient will report decrease in pain to 5-6/10 at most to allow ease of ADL's and household tasks. 2. Patient will improve B cervical lateral flexion AROM to 35 degrees to allow decreased pain with household tasks. 3. Patient will improve L cervical rotation AROM to 70 degrees to allow decreased pain with turning head during driving. 4. Patient will improve B shoulder and elbow strength to 5/5 to allow ease of lifting. 5. Patient will improve trunk AROM to WFL's to allow ease of bending and lifting tasks. 6. Patient will improve B hip strength to 5/5 to allow ease of walking and daily tasks. 7. Patient will perform B SLR for 15 seconds to improve core strength needed for ease of standing tasks. 8. Patient will improve 90/90 hamstring length to 30 degrees B to allow decreased pain with standing/walking. Long Term Goals: 8 weeks  1. Patient will improve Modified Oswestry score from 28/50 to 18/50 to allow decrease in disability and improved functional mobility. 2. Patient will be independent with HEP in order to prevent re-injury and improve functional abilities. Patient Education:   [x]  HEP/Education Completed: monitor response to static traction.    [employer] Access Code:  []  No new Education completed  [x]  Reviewed Prior HEP      [x]  Patient verbalized and/or demonstrated understanding of education provided. []  Patient unable to verbalize and/or demonstrate understanding of education provided. Will continue education. []  Barriers to learning:     PLAN:  Treatment Recommendations: Strengthening, Range of Motion, Manual Therapy - Soft Tissue Mobilization, Manual Therapy - Joint Manipulation, Pain Management, Home Exercise Program, Patient Education, Integrative Dry Needling, Aquatics and Modalities      Plan to see patient 2 times per week for 8 weeks to address the treatment planned outlined above.   [x]  Continue with current plan of care  []  Modify plan of care as follows:    []  Hold pending physician visit  []  Discharge    Time In 0936   Time Out 1010   Timed Code Minutes: 19 min   Total Treatment Time: 34 min     Electronically Signed by: Kael Gil PTA

## 2022-07-06 ENCOUNTER — HOSPITAL ENCOUNTER (OUTPATIENT)
Dept: PHYSICAL THERAPY | Age: 52
Setting detail: THERAPIES SERIES
Discharge: HOME | End: 2022-07-06
Payer: MEDICARE

## 2022-07-06 PROCEDURE — 97110 THERAPEUTIC EXERCISES: CPT

## 2022-07-06 PROCEDURE — 97012 MECHANICAL TRACTION THERAPY: CPT

## 2022-07-06 NOTE — PROGRESS NOTES
** PLEASE SIGN, DATE AND TIME CERTIFICATION BELOW AND RETURN TO [facility] OUTPATIENT REHABILITATION (FAX #: [phone]). ATTEST/CO-SIGN IF ACCESSING VIA [employer] THANK YOU.**    I certify that I have examined the patient below and determined that Physical Medicine and Rehabilitation service is necessary and that I approve the established plan of care for up to 90 days or as specifically noted.   Attestation, signature or co-signature of physician indicates approval of certification requirements.    ________________________ ____________ __________  Physician Signature   Date   Time    [address] [facility]  PHYSICAL THERAPY  [] DAILY NOTE (LAND) [] DAILY NOTE (AQUATIC ) [x] PROGRESS NOTE [] DISCHARGE NOTE    [x] OUTPATIENT REHABILITATION CENTER [facility]   [] [place] [address]    [] [facility]   [] [facility]    Date: 2022  Patient Name:  Parvin Leroy  : 1970  MRN: [id number]  CSN: [id number]    Referring Practitioner KATHLEEN Sheehan Che -*   Diagnosis Cervical spinal stenosis (M48.02)  degenerative lumbar spinal stenosis (m48.061)  cervical radiculopathy (M54.12)  trochanteric bursitis of R hip (M70.61)   Treatment Diagnosis Pain in neck, pain in back, pain in R hip, decreased cervical ROM, decreased shoulder ROM, decreased strength in UE, decreased strength in LE, decreased core strength, poor posture    Date of Evaluation 22    Additional Pertinent History HTN, bipolar, diabetes, anxiety, arthritis       Functional Outcome Measure Used Modified Oswestry    Functional Outcome Score 28/50 (22) 33/50 (22)      Insurance: Primary: Payor: Sergei Ring /  /  / ,   Secondary:    Authorization Information: PRE CERTIFICATION REQUIRED: NO  INSURANCE THERAPY BENEFIT:  NO VISIT LIMIT   AQUATIC THERAPY COVERED: YES  MODALITIES COVERED:  YES   Visit # 8, 0/10 for progress note   Visits Allowed: No visit limit   Recertification Date: 3/9/78   Physician and abduction AAROM with wand 10x      Gentle upper trap/levator stretches 3x 10 sec      SNAG retraction, extension 10*   Extension increased numbness so only doing 6   Seated cervical retraction with therapist overpressure  3x10   No change   Seated cervical retraction by patient       Hook lying gentle abdominal bracing 10x 3 sec   Bolster behind knees   Abdominal bracing with SAQ  10x5s             Use of towel in pillow for cervical support       Towel roll in seated for lumbar support       Education on posture correction and awareness       Re-assessment measurements   x      Specific Interventions Next Treatment: NECK: chin tucks, upper trap/levator/pec stretching, SNAG retraction/extension/rotation, shoulder flexion and abduction AAROM, scapular strengthening, manual cervical distraction, modalities as needed. BACK: prone for centralization of symptoms, core strength, hip strength, HS/IT band/piriformis, hip flexor stretching, modalities as needed. Focus on neck/L arm more. Activity/Treatment Tolerance:  [x]  Patient tolerated treatment well  []  Patient limited by fatigue  []  Patient limited by pain   []  Patient limited by medical complications  []  Other:     Assessment: Patient seen for progress report today. Patient has been seen for 8 visits at this time. Patient has made progress toward goals. Patient improved pain levels to 6/10 at most in the past week. Patient reports improved radicular symptoms in his arms. Patient improved L cervical rotation from 60 to 80 degrees. Patient improved L cervical lateral flexion AROM from 23 to 40 degrees. Patient improved trunk AROM as seen below. Patient improved B shoulder and elbow strength as seen below. Patient improved hip strength to 5/5 MMT. Patient improved 90/90 hamstring length to 25-35 degrees.  Patient does continue to have neck and back pain, radicular symptoms, decreased cervical ROM, decreased trunk ROM, decreased L UE strength, decreased core strength, and tightness in hamstrings that limit his ability to perform daily tasks that involve standing and walking. Patient would benefit from continued skilled PT to further improve deficits mentioned above to allow improved functional mobility. Plan to see patient 2 times per week for an additional 4 weeks. Goals    Patient Goal: to improve pain and numbness in arms    Short Term Goals: 4 weeks  1. Patient will report decrease in pain to 5-6/10 at most to allow ease of ADL's and household tasks. GOAL MET:  Patient reports 6/10 pain in neck/back at most in the past week, 4/10 pain currently. Continue Goal  2. Patient will improve B cervical lateral flexion AROM to 35 degrees to allow decreased pain with household tasks. GOAL NOT MET: R 28 and L 40 degrees. Continue Goal  3. Patient will improve L cervical rotation AROM to 70 degrees to allow decreased pain with turning head during driving. GOAL MET:  L rotation 80 degrees . Discontinue Goal  4. Patient will improve B shoulder and elbow strength to 5/5 to allow ease of lifting. GOAL NOT MET: B shoulder flexion 5/5, B shoulder abduction 5/5, R IR 5/5 L IR 4/5 R ER 5/5 L ER 4-/5, R elbow flexion and extension 5/5 L elbow flexion and extension 4/5. Continue Goal  5. Patient will improve trunk AROM to WFL's to allow ease of bending and lifting tasks. GOAL NOT MET: Trunk flexion 25% limited, extension WFL, R lateral flexion WFL, L lateral flexion 25% limited, B rotation WFLs. Continue Goal  6. Patient will improve B hip strength to 5/5 to allow ease of walking and daily tasks. GOAL MET:  B hip flexion and abduction 5/5 MMT. Discontinue Goal  7. Patient will perform B SLR for 15 seconds to improve core strength needed for ease of standing tasks. GOAL NOT MET: B SLR 7 seconds . Continue Goal  8. Patient will improve 90/90 hamstring length to 30 degrees B to allow decreased pain with standing/walking. GOAL NOT MET: R 35 and L 25 degrees.   Continue Goal    Long Term Goals: 8 weeks  1. Patient will improve Modified Oswestry score from 28/50 to 18/50 to allow decrease in disability and improved functional mobility. GOAL NOT MET: DTNE 06/42. Continue Goal  2. Patient will be independent with HEP in order to prevent re-injury and improve functional abilities. GOAL MET:  Patient is I with HEP and performs most exercises . Continue Goal    Patient Education:   [x]  HEP/Education Completed: continue HEP    [facility] Access Code:  []  No new Education completed  [x]  Reviewed Prior HEP      [x]  Patient verbalized and/or demonstrated understanding of education provided. []  Patient unable to verbalize and/or demonstrate understanding of education provided. Will continue education. []  Barriers to learning:     PLAN:  Treatment Recommendations: Strengthening, Range of Motion, Manual Therapy - Soft Tissue Mobilization, Manual Therapy - Joint Manipulation, Pain Management, Home Exercise Program, Patient Education, Integrative Dry Needling, Aquatics and Modalities      Plan to see patient 2 times per week for 8 weeks to address the treatment planned outlined above.   []  Continue with current plan of care  [x]  Modify plan of care as follows: 2 times per week for an additional 4 weeks    []  Hold pending physician visit  []  Discharge    Time In 0950   Time Out 1030   Timed Code Minutes: 25 min   Total Treatment Time: 40 min     Electronically Signed by: Ellen Vitale PT

## 2022-07-14 ENCOUNTER — HOSPITAL ENCOUNTER (OUTPATIENT)
Dept: PHYSICAL THERAPY | Age: 52
Setting detail: THERAPIES SERIES
End: 2022-07-14
Payer: MEDICARE

## 2022-07-18 NOTE — DISCHARGE SUMMARY
[facility] NOTE  OUTPATIENT  [place] [address]    Patient Name: Dev Albarran        CSN: [id number]   YOB: 1970  Gender: male  KATHLEEN Mora -*,    BACK PAIN  ,      Patient is discharged from Physical Therapy services at this time. See last note for details related to results of therapy and goal achievement. Reason for discharge: Patient requested to be discharged. Patient is feeling better with less pain and will continue with HEP.       Abhi Reyes, PT

## 2022-07-20 ENCOUNTER — APPOINTMENT (OUTPATIENT)
Dept: PHYSICAL THERAPY | Age: 52
End: 2022-07-20
Payer: MEDICARE

## 2022-07-25 ENCOUNTER — APPOINTMENT (OUTPATIENT)
Dept: PHYSICAL THERAPY | Age: 52
End: 2022-07-25
Payer: MEDICARE

## 2022-07-27 ENCOUNTER — APPOINTMENT (OUTPATIENT)
Dept: PHYSICAL THERAPY | Age: 52
End: 2022-07-27
Payer: MEDICARE

## 2022-08-03 ENCOUNTER — HOSPITAL ENCOUNTER (OUTPATIENT)
Dept: PHYSICAL THERAPY | Age: 52
Setting detail: THERAPIES SERIES
Discharge: HOME | End: 2022-08-03

## 2022-08-12 DIAGNOSIS — E11.9 TYPE 2 DIABETES MELLITUS WITHOUT COMPLICATION, WITHOUT LONG-TERM CURRENT USE OF INSULIN (HCC): ICD-10-CM

## 2022-08-15 RX ORDER — METFORMIN HYDROCHLORIDE 500 MG/1
TABLET, EXTENDED RELEASE ORAL
Qty: 90 TABLET | Refills: 1 | Status: SHIPPED | OUTPATIENT
Start: 2022-08-15

## 2022-08-15 NOTE — TELEPHONE ENCOUNTER
Recent Visits  Date Type Provider Dept   06/08/22 Office Visit KATHLEEN Irwin CNP [facility]   05/24/22 Office Visit KATHLEEN Irwin CNP [facility]   09/16/21 Office Visit KATHLEEN Irwin CNP [facility]   09/02/21 Office Visit KATHLEEN Irwin CNP [facility]   06/11/21 Office Visit KATHLEEN Irwin CNP [facility]   04/12/21 Office Visit KATHLEEN Irwin CNP [facility]   Showing recent visits within past 540 days with a meds authorizing provider and meeting all other requirements  Future Appointments  Date Type Provider Dept   11/22/22 Appointment KATHLEEN Irwin CNP [facility]   Showing future appointments within next 150 days with a meds authorizing provider and meeting all other requirements      Future Appointments   Date Time Provider [place]   11/22/2022 11:00 AM KATHLEEN Irwin 13-Apr-2022

## 2022-10-16 DIAGNOSIS — R00.0 TACHYCARDIA: ICD-10-CM

## 2022-10-16 DIAGNOSIS — I10 ESSENTIAL HYPERTENSION: ICD-10-CM

## 2022-10-16 DIAGNOSIS — F41.9 ANXIETY: ICD-10-CM

## 2022-10-17 RX ORDER — METOPROLOL SUCCINATE 50 MG/1
TABLET, EXTENDED RELEASE ORAL
Qty: 90 TABLET | Refills: 1 | Status: SHIPPED | OUTPATIENT
Start: 2022-10-17

## 2022-10-17 RX ORDER — BUSPIRONE HYDROCHLORIDE 15 MG/1
TABLET ORAL
Qty: 270 TABLET | Refills: 1 | Status: SHIPPED | OUTPATIENT
Start: 2022-10-17

## 2022-10-17 NOTE — TELEPHONE ENCOUNTER
Recent Visits  Date Type Provider Dept   06/08/22 Office Visit Thomasenia Ranch, APRN - CNP [facility]   05/24/22 Office Visit Thomasenia Ranch, APRN - CNP [facility]   09/16/21 Office Visit Thomasenia Ranch, APRN - CNP [facility]   09/02/21 Office Visit Thomasenia Ranch, APRN - CNP [facility]   06/11/21 Office Visit Thomasenia Ranch, APRN - CNP [facility]   Showing recent visits within past 540 days with a meds authorizing provider and meeting all other requirements  Future Appointments  Date Type Provider Dept   11/22/22 Appointment KATHLEEN Torres CNP [facility]   Showing future appointments within next 150 days with a meds authorizing provider and meeting all other requirements     Future Appointments   Date Time Provider [place]   11/22/2022 11:00 AM KATHLEEN Torres

## 2022-11-22 ENCOUNTER — OFFICE VISIT (OUTPATIENT)
Dept: FAMILY MEDICINE CLINIC | Age: 52
End: 2022-11-22
Payer: MEDICARE

## 2022-11-22 VITALS
HEIGHT: 71 IN | WEIGHT: 289.4 LBS | DIASTOLIC BLOOD PRESSURE: 86 MMHG | TEMPERATURE: 98.1 F | SYSTOLIC BLOOD PRESSURE: 126 MMHG | HEART RATE: 112 BPM | BODY MASS INDEX: 40.52 KG/M2 | OXYGEN SATURATION: 96 % | RESPIRATION RATE: 16 BRPM

## 2022-11-22 DIAGNOSIS — R23.3 EASY BRUISING: ICD-10-CM

## 2022-11-22 DIAGNOSIS — Z00.00 MEDICARE ANNUAL WELLNESS VISIT, SUBSEQUENT: Primary | ICD-10-CM

## 2022-11-22 DIAGNOSIS — E11.9 TYPE 2 DIABETES MELLITUS WITHOUT COMPLICATION, WITHOUT LONG-TERM CURRENT USE OF INSULIN (HCC): ICD-10-CM

## 2022-11-22 LAB — HBA1C MFR BLD: 13.4 % (ref 4.3–5.7)

## 2022-11-22 PROCEDURE — 3046F HEMOGLOBIN A1C LEVEL >9.0%: CPT | Performed by: NURSE PRACTITIONER

## 2022-11-22 PROCEDURE — 3074F SYST BP LT 130 MM HG: CPT | Performed by: NURSE PRACTITIONER

## 2022-11-22 PROCEDURE — 3017F COLORECTAL CA SCREEN DOC REV: CPT | Performed by: NURSE PRACTITIONER

## 2022-11-22 PROCEDURE — G8484 FLU IMMUNIZE NO ADMIN: HCPCS | Performed by: NURSE PRACTITIONER

## 2022-11-22 PROCEDURE — G0439 PPPS, SUBSEQ VISIT: HCPCS | Performed by: NURSE PRACTITIONER

## 2022-11-22 PROCEDURE — 3078F DIAST BP <80 MM HG: CPT | Performed by: NURSE PRACTITIONER

## 2022-11-22 RX ORDER — METFORMIN HYDROCHLORIDE 500 MG/1
1000 TABLET, EXTENDED RELEASE ORAL 2 TIMES DAILY
Qty: 360 TABLET | Refills: 1 | Status: SHIPPED | OUTPATIENT
Start: 2022-11-22

## 2022-11-22 SDOH — ECONOMIC STABILITY: FOOD INSECURITY: WITHIN THE PAST 12 MONTHS, YOU WORRIED THAT YOUR FOOD WOULD RUN OUT BEFORE YOU GOT MONEY TO BUY MORE.: NEVER TRUE

## 2022-11-22 SDOH — ECONOMIC STABILITY: FOOD INSECURITY: WITHIN THE PAST 12 MONTHS, THE FOOD YOU BOUGHT JUST DIDN'T LAST AND YOU DIDN'T HAVE MONEY TO GET MORE.: NEVER TRUE

## 2022-11-22 ASSESSMENT — PATIENT HEALTH QUESTIONNAIRE - PHQ9
2. FEELING DOWN, DEPRESSED OR HOPELESS: 0
SUM OF ALL RESPONSES TO PHQ9 QUESTIONS 1 & 2: 0
7. TROUBLE CONCENTRATING ON THINGS, SUCH AS READING THE NEWSPAPER OR WATCHING TELEVISION: 0
3. TROUBLE FALLING OR STAYING ASLEEP: 3
SUM OF ALL RESPONSES TO PHQ QUESTIONS 1-9: 4
8. MOVING OR SPEAKING SO SLOWLY THAT OTHER PEOPLE COULD HAVE NOTICED. OR THE OPPOSITE, BEING SO FIGETY OR RESTLESS THAT YOU HAVE BEEN MOVING AROUND A LOT MORE THAN USUAL: 0
SUM OF ALL RESPONSES TO PHQ QUESTIONS 1-9: 4
SUM OF ALL RESPONSES TO PHQ QUESTIONS 1-9: 4
6. FEELING BAD ABOUT YOURSELF - OR THAT YOU ARE A FAILURE OR HAVE LET YOURSELF OR YOUR FAMILY DOWN: 0
4. FEELING TIRED OR HAVING LITTLE ENERGY: 1
SUM OF ALL RESPONSES TO PHQ QUESTIONS 1-9: 4
9. THOUGHTS THAT YOU WOULD BE BETTER OFF DEAD, OR OF HURTING YOURSELF: 0
1. LITTLE INTEREST OR PLEASURE IN DOING THINGS: 0
5. POOR APPETITE OR OVEREATING: 0

## 2022-11-22 ASSESSMENT — LIFESTYLE VARIABLES
HOW MANY STANDARD DRINKS CONTAINING ALCOHOL DO YOU HAVE ON A TYPICAL DAY: PATIENT DOES NOT DRINK
HOW OFTEN DO YOU HAVE A DRINK CONTAINING ALCOHOL: NEVER

## 2022-11-22 ASSESSMENT — SOCIAL DETERMINANTS OF HEALTH (SDOH): HOW HARD IS IT FOR YOU TO PAY FOR THE VERY BASICS LIKE FOOD, HOUSING, MEDICAL CARE, AND HEATING?: NOT HARD AT ALL

## 2022-11-22 NOTE — PATIENT INSTRUCTIONS
Personalized Preventive Plan for Carol Sheppard - 11/22/2022  Medicare offers a range of preventive health benefits. Some of the tests and screenings are paid in full while other may be subject to a deductible, co-insurance, and/or copay. Some of these benefits include a comprehensive review of your medical history including lifestyle, illnesses that may run in your family, and various assessments and screenings as appropriate. After reviewing your medical record and screening and assessments performed today your provider may have ordered immunizations, labs, imaging, and/or referrals for you. A list of these orders (if applicable) as well as your Preventive Care list are included within your After Visit Summary for your review. Other Preventive Recommendations:    A preventive eye exam performed by an eye specialist is recommended every 1-2 years to screen for glaucoma; cataracts, macular degeneration, and other eye disorders. A preventive dental visit is recommended every 6 months. Try to get at least 150 minutes of exercise per week or 10,000 steps per day on a pedometer . Order or download the FREE \"Exercise & Physical Activity: Your Everyday Guide\" from [employer]. Call 6-925.501.3713 or search [employer] online. You need 0429-8598 mg of calcium and 4014-1708 IU of vitamin D per day. It is possible to meet your calcium requirement with diet alone, but a vitamin D supplement is usually necessary to meet this goal.  When exposed to the sun, use a sunscreen that protects against both UVA and UVB radiation with an SPF of 30 or greater. Reapply every 2 to 3 hours or after sweating, drying off with a towel, or swimming. Always wear a seat belt when traveling in a car. Always wear a helmet when riding a bicycle or motorcycle.

## 2022-11-22 NOTE — PROGRESS NOTES
Medicare Annual Wellness Visit    Rafael Mehta is here for Medicare AWV and Health Maintenance (No recent colonoscopy )    Assessment & Plan   Medicare annual wellness visit, subsequent  Type 2 diabetes mellitus without complication, without long-term current use of insulin (HCC)  -     metFORMIN (GLUCOPHAGE-XR) 500 MG extended release tablet; Take 2 tablets by mouth in the morning and at bedtime, Disp-360 tablet, R-1Normal  -     Tirzepatide (MOUNJARO) 2.5 MG/0.5ML SOPN SC injection; Inject 0.5 mLs into the skin once a week for 4 doses, Disp-2 mL, R-0Normal  -     Comprehensive Metabolic Panel; Future  -     CBC with Auto Differential; Future  -     Lipid, Fasting; Future  -     Microalbumin / Creatinine Urine Ratio; Future  Easy bruising  -     Comprehensive Metabolic Panel; Future        - work on diet/exercise  - increase Metformin to 1000 mg bid  - add Mounjaro to help with DM and also weight loss  - obtain labs  - needs to also check fasting sugars daily and record    Recommendations for Preventive Services Due: see orders and patient instructions/AVS.  Recommended screening schedule for the next 5-10 years is provided to the patient in written form: see Patient Instructions/AVS.     Return in 3 weeks (on 12/13/2022) for DM, Medicare Annual Wellness Visit in 1 year. Subjective   The following acute and/or chronic problems were also addressed today:    Also complains of easy bruising. Diabetes Type 2    Glucose control:   Does patient check blood glucoses at home? No  Report of hypoglycemia: no  Lab Results   Component Value Date    LABA1C 13.4 (H) 11/22/2022     No results found for: EAG    Symptoms  Polyuria, Polydipsia or Polyphagia? No  Chest Pain, SOB, or Palpitations? -  No  New Vision complaints? No  Paresthesias of the extremities? Yes - chronic in legs    Medications  Current medication were reviewed. Compliant with medications? yes  Medication side effects? No  On ACE-I or ARB?   Yes  On antiplatelet therapy? No  On Statin? No    Last Diabetic Eye Exam: needs    Exercise  Exercise? No  Wt Readings from Last 3 Encounters:   11/22/22 289 lb 6.4 oz (131.3 kg)   06/08/22 298 lb 9.6 oz (135.4 kg)   05/24/22 293 lb 3.2 oz (133 kg)       Diet discipline?:  Low salt, fat, sugar diet?  no    Blood pressure control:  BP Readings from Last 3 Encounters:   11/22/22 126/86   06/08/22 130/86   05/24/22 126/82       No results found for: Sisi Huertas    Lab Results   Component Value Date    [address] 106 02/12/2021           Patient's complete Health Risk Assessment and screening values have been reviewed and are found in Flowsheets. The following problems were reviewed today and where indicated follow up appointments were made and/or referrals ordered. Positive Risk Factor Screenings with Interventions:       Tobacco Use:  Tobacco Use: High Risk    Smoking Tobacco Use: Every Day    Smokeless Tobacco Use: Never    Passive Exposure: Not on file     E-cigarette/Vaping       Questions Responses    E-cigarette/Vaping Use     Start Date     Passive Exposure     Quit Date     Counseling Given     Comments           Substance Use - Tobacco Interventions:  He has tried chantix in the past. He is interested in quitting. Only goes a few days and can't seem to quit          Opioid Risk: (Low risk score <55) Opioid risk score: 51    Patient is low risk for opioid use disorder or overdose.   Last PDMP [facility] as Reviewed:  Review User Review Instant Review Result                 General Health and ACP:  General  In general, how would you say your health is?: Fair  In the past 7 days, have you experienced any of the following: New or Increased Pain, New or Increased Fatigue, Loneliness, Social Isolation, Stress or Anger?: No  Do you get the social and emotional support that you need?: Yes  Do you have a Living Will?: (!) No    Advance Directives       Power of [facility] Will ACP-Advance Directive ACP-Power of     Not on File Not on File Not on File Not on File          General Health Risk Interventions:  Poor self-assessment of health status: patient declines any further evaluation/treatment for this issue  Pain issues: follows with pain management  Fatigue: patient declines any further evaluation/treatment for this issue  Loneliness: patient declines any further intervention for this issue  Social isolation: patient declines any further intervention for this issue  Stress: patient declines any further evaluation/treatment for this issue  Anger: patient declines any further evaluation/treatment for this issue  Inadequate social/emotional support: patient declines any further intervention for this issue  No Living Will: Patient declines ACP discussion/assistance    Health Habits/Nutrition:  Physical Activity: Unknown    Days of Exercise per Week: Not on file    Minutes of Exercise per Session: 0 min     Have you lost any weight without trying in the past 3 months?: No  Body mass index: (!) 40.36  Have you seen the dentist within the past year?: (!) No  Health Habits/Nutrition Interventions:  Inadequate physical activity:  he is going to work on exercising  Nutritional issues:  work on diabetic diet  Dental exam overdue:  patient encouraged to make appointment with his/her dentist    Hearing/Vision:  Do you or your family notice any trouble with your hearing that hasn't been managed with hearing aids?: No  Do you have difficulty driving, watching TV, or doing any of your daily activities because of your eyesight?: No  Have you had an eye exam within the past year?: (!) No  No results found.   Hearing/Vision Interventions:  Hearing concerns:  patient declines any further evaluation/treatment for hearing issues  Vision concerns:  patient declines any further evaluation/treatment for this issue    Safety:  Do you have working smoke detectors?: Yes  Do you have any tripping hazards - loose or unsecured carpets or rugs?: No  Do you have any tripping hazards - clutter in doorways, halls, or stairs?: No  Do you have either shower bars, grab bars, non-slip mats or non-slip surfaces in your shower or bathtub?: Yes  Do all of your stairways have a railing or banister?: (!) No  Do you always fasten your seatbelt when you are in a car?: Yes  Safety Interventions:  Home safety tips provided           Objective   Vitals:    11/22/22 1102   BP: 126/86   Pulse: (!) 112   Resp: 16   Temp: 98.1 °F (36.7 °C)   TempSrc: Oral   SpO2: 96%   Weight: 289 lb 6.4 oz (131.3 kg)   Height: 5' 11\" (1.803 m)      Body mass index is 40.36 kg/m². General Appearance: alert and oriented to person, place and time, well developed and well- nourished, in no acute distress  Skin: warm and dry, no rash or erythema  Head: normocephalic and atraumatic  Eyes: pupils equal, round, and reactive to light, extraocular eye movements intact, conjunctivae normal  ENT: tympanic membrane, external ear and ear canal normal bilaterally, nose without deformity, nasal mucosa and turbinates normal without polyps  Neck: supple and non-tender without mass, no thyromegaly or thyroid nodules, no cervical lymphadenopathy  Pulmonary/Chest: clear to auscultation bilaterally- no wheezes, rales or rhonchi, normal air movement, no respiratory distress  Cardiovascular: normal rate, regular rhythm, normal S1 and S2, no murmurs, rubs, clicks, or gallops, distal pulses intact, no carotid bruits  Abdomen: soft, non-tender, non-distended, normal bowel sounds, no masses or organomegaly  Extremities: no cyanosis, clubbing or edema  Musculoskeletal: normal range of motion, no joint swelling, deformity or tenderness  Neurologic: reflexes normal and symmetric, no cranial nerve deficit, gait, coordination and speech normal       Allergies   Allergen Reactions    Tylenol [Acetaminophen]     Celebrex [Celecoxib] Rash     Prior to Visit Medications    Medication Sig Taking?  Authorizing Provider metFORMIN (GLUCOPHAGE-XR) 500 MG extended release tablet Take 2 tablets by mouth in the morning and at bedtime Yes KATHLEEN Odom CNP   Tirzepatide [facility]) 2.5 MG/0.5ML SOPN SC injection Inject 0.5 mLs into the skin once a week for 4 doses Yes KATHLEEN Odom CNP   metoprolol succinate (TOPROL XL) 50 MG extended release tablet take 1 tablet by mouth once daily Yes KATHLEEN Chaudhry CNP   busPIRone (BUSPAR) 15 MG tablet take 1 tablet by mouth three times a day Yes KATHLEEN Chaudhry CNP   hydroCHLOROthiazide (HYDRODIURIL) 12.5 MG tablet take 1 tablet by mouth once daily Yes KATHLEEN Odom CNP   gabapentin (NEURONTIN) 800 MG tablet take 1 tablet by mouth four times a day Yes KATHLEEN Odom CNP   baclofen (LIORESAL) 20 MG tablet Take 1 tablet by mouth 3 times daily Yes KATHLEEN Odom CNP   fluticasone (FLONASE) 50 MCG/ACT nasal spray 1 spray by Each Nostril route daily Yes KATHLEEN Angela CNP   oxyCODONE HCl (OXY-IR) 10 MG immediate release tablet take 1 tablet by mouth three times a day if needed for pain Yes Historical Provider, MD   Handicap Placard MISC by Does not apply route Expires 2/17/2026 Yes KATHLEEN Odom CNP   Elastic Bandages & Supports (KNEE BRACE/HINGED/REGULAR) MISC 1 Device by Does not apply route daily Yes KATHLEEN Odom CNP   lamoTRIgine (LAMICTAL) 25 MG tablet  Yes Historical Provider, MD   NARCAN 4 MG/0.1ML LIQD nasal spray  Yes Historical Provider, MD   oxyCODONE (ROXICODONE) 5 MG immediate release tablet 10 mg.  Yes Historical Provider, MD   QUEtiapine (SEROQUEL) 100 MG tablet Take 100 mg by mouth nightly Yes Historical Provider, MD   vitamin E 400 UNIT capsule Take 1 capsule by mouth 2 times daily  Priyanka PushMD Carmichael (Including outside providers/suppliers regularly involved in providing care):   Patient Care Team:  KATHLEEN Odom CNP as PCP - General (Family Medicine)  KATHLEEN Odom CNP as PCP - [facility] Empaneled Provider     Reviewed and updated this visit:  Tobacco  Allergies  Meds  Med Hx  Surg Hx  Soc Hx  Fam Hx

## 2022-12-03 DIAGNOSIS — M48.061 DEGENERATIVE LUMBAR SPINAL STENOSIS: ICD-10-CM

## 2022-12-03 DIAGNOSIS — M51.26 HERNIATED LUMBAR INTERVERTEBRAL DISC: ICD-10-CM

## 2022-12-04 DIAGNOSIS — M51.26 HERNIATED LUMBAR INTERVERTEBRAL DISC: ICD-10-CM

## 2022-12-04 DIAGNOSIS — M48.061 DEGENERATIVE LUMBAR SPINAL STENOSIS: ICD-10-CM

## 2022-12-05 RX ORDER — BACLOFEN 20 MG/1
TABLET ORAL
Qty: 90 TABLET | Refills: 5 | Status: SHIPPED | OUTPATIENT
Start: 2022-12-05

## 2022-12-05 RX ORDER — GABAPENTIN 800 MG/1
TABLET ORAL
Qty: 360 TABLET | Refills: 1 | Status: SHIPPED | OUTPATIENT
Start: 2022-12-05 | End: 2023-06-03

## 2022-12-05 NOTE — TELEPHONE ENCOUNTER
Recent Visits  Date Type Provider Dept   11/22/22 Office Visit KATHLEEN Brown CNP [facility]   06/08/22 Office Visit KATHLEEN Brown CNP [facility]   05/24/22 Office Visit KATHLEEN Brown CNP [facility]   09/16/21 Office Visit KATHLEEN Brown CNP [facility]   09/02/21 Office Visit KATHLEEN Brown CNP [facility]   Showing recent visits within past 540 days with a meds authorizing provider and meeting all other requirements  Future Appointments  Date Type Provider Dept   12/20/22 Appointment KATHLEEN Brown CNP [facility]   Showing future appointments within next 150 days with a meds authorizing provider and meeting all other requirements     Future Appointments   Date Time Provider [place]   12/20/2022 12:40 PM KATHLEEN Brown

## 2022-12-05 NOTE — TELEPHONE ENCOUNTER
Recent Visits  Date Type Provider Dept   11/22/22 Office Visit KATHLEEN Perkins CNP [facility]   06/08/22 Office Visit KATHLEEN Perkins CNP [facility]   05/24/22 Office Visit KATHLEEN Perkins CNP [facility]   09/16/21 Office Visit KATHLEEN Perkins CNP [facility]   09/02/21 Office Visit KATHLEEN Perkins CNP [facility]   Showing recent visits within past 540 days with a meds authorizing provider and meeting all other requirements  Future Appointments  Date Type Provider Dept   12/20/22 Appointment KATHLEEN Perkins CNP [facility]   Showing future appointments within next 150 days with a meds authorizing provider and meeting all other requirements     Future Appointments   Date Time Provider [place]   12/20/2022 12:40 PM KATHLEEN Perkins

## 2022-12-13 DIAGNOSIS — E11.9 TYPE 2 DIABETES MELLITUS WITHOUT COMPLICATION, WITHOUT LONG-TERM CURRENT USE OF INSULIN (HCC): ICD-10-CM

## 2022-12-13 NOTE — TELEPHONE ENCOUNTER
----- Message from Markos Holt MA sent at 12/13/2022 12:13 PM EST -----  Subject: Refill Request    QUESTIONS  Name of Medication? Tirzepatide (MOUNJARO) 2.5 MG/0.5ML SOPN SC injection  Patient-reported dosage and instructions? 2.5mg weekly  How many days do you have left? 8  Preferred Pharmacy? [id number]  Pharmacy phone number (if available)? [phone]  ---------------------------------------------------------------------------  --------------  CALL BACK INFO  What is the best way for the office to contact you? OK to leave message on   voicemail  Preferred Call Back Phone Number? [phone]  ---------------------------------------------------------------------------  --------------  SCRIPT ANSWERS  Relationship to Patient?  Self

## 2023-01-07 ENCOUNTER — NURSE ONLY (OUTPATIENT)
Dept: LAB | Age: 53
End: 2023-01-07

## 2023-01-07 DIAGNOSIS — R23.3 EASY BRUISING: ICD-10-CM

## 2023-01-07 DIAGNOSIS — E11.9 TYPE 2 DIABETES MELLITUS WITHOUT COMPLICATION, WITHOUT LONG-TERM CURRENT USE OF INSULIN (HCC): ICD-10-CM

## 2023-01-07 LAB
ALBUMIN SERPL-MCNC: 4 G/DL (ref 3.5–5.1)
ALP BLD-CCNC: 117 U/L (ref 38–126)
ALT SERPL-CCNC: 83 U/L (ref 11–66)
ANION GAP SERPL CALCULATED.3IONS-SCNC: 14 MEQ/L (ref 8–16)
AST SERPL-CCNC: 38 U/L (ref 5–40)
BASOPHILS # BLD: 0.8 %
BASOPHILS ABSOLUTE: 0.1 THOU/MM3 (ref 0–0.1)
BILIRUB SERPL-MCNC: 0.4 MG/DL (ref 0.3–1.2)
BUN BLDV-MCNC: 11 MG/DL (ref 7–22)
CALCIUM SERPL-MCNC: 8.8 MG/DL (ref 8.5–10.5)
CHLORIDE BLD-SCNC: 100 MEQ/L (ref 98–111)
CHOLESTEROL, FASTING: 149 MG/DL (ref 100–199)
CO2: 24 MEQ/L (ref 23–33)
CREAT SERPL-MCNC: 1 MG/DL (ref 0.4–1.2)
EOSINOPHIL # BLD: 2.1 %
EOSINOPHILS ABSOLUTE: 0.3 THOU/MM3 (ref 0–0.4)
ERYTHROCYTE [DISTWIDTH] IN BLOOD BY AUTOMATED COUNT: 12.8 % (ref 11.5–14.5)
ERYTHROCYTE [DISTWIDTH] IN BLOOD BY AUTOMATED COUNT: 42.1 FL (ref 35–45)
GFR SERPL CREATININE-BSD FRML MDRD: > 60 ML/MIN/1.73M2
GLUCOSE BLD-MCNC: 161 MG/DL (ref 70–108)
HCT VFR BLD CALC: 50.7 % (ref 42–52)
HDLC SERPL-MCNC: 37 MG/DL
HEMOGLOBIN: 16.7 GM/DL (ref 14–18)
IMMATURE GRANS (ABS): 0.07 THOU/MM3 (ref 0–0.07)
IMMATURE GRANULOCYTES: 0.5 %
LDL CHOLESTEROL CALCULATED: 85 MG/DL
LYMPHOCYTES # BLD: 25.7 %
LYMPHOCYTES ABSOLUTE: 3.7 THOU/MM3 (ref 1–4.8)
MCH RBC QN AUTO: 29.8 PG (ref 26–33)
MCHC RBC AUTO-ENTMCNC: 32.9 GM/DL (ref 32.2–35.5)
MCV RBC AUTO: 90.4 FL (ref 80–94)
MONOCYTES # BLD: 7.8 %
MONOCYTES ABSOLUTE: 1.1 THOU/MM3 (ref 0.4–1.3)
NUCLEATED RED BLOOD CELLS: 0 /100 WBC
PLATELET # BLD: 264 THOU/MM3 (ref 130–400)
PMV BLD AUTO: 11.2 FL (ref 9.4–12.4)
POTASSIUM SERPL-SCNC: 4.4 MEQ/L (ref 3.5–5.2)
RBC # BLD: 5.61 MILL/MM3 (ref 4.7–6.1)
SEG NEUTROPHILS: 63.1 %
SEGMENTED NEUTROPHILS ABSOLUTE COUNT: 9 THOU/MM3 (ref 1.8–7.7)
SODIUM BLD-SCNC: 138 MEQ/L (ref 135–145)
TOTAL PROTEIN: 6.4 G/DL (ref 6.1–8)
TRIGLYCERIDE, FASTING: 134 MG/DL (ref 0–199)
WBC # BLD: 14.3 THOU/MM3 (ref 4.8–10.8)

## 2023-01-09 ENCOUNTER — TELEPHONE (OUTPATIENT)
Dept: FAMILY MEDICINE CLINIC | Age: 53
End: 2023-01-09

## 2023-01-09 NOTE — TELEPHONE ENCOUNTER
----- Message from KATHLEEN Vidal - CNP sent at 1/9/2023 12:38 PM EST -----  Let Hernan know his labs overall are stable. Will go over in detail at upcoming appt.

## 2023-01-10 ENCOUNTER — OFFICE VISIT (OUTPATIENT)
Dept: FAMILY MEDICINE CLINIC | Age: 53
End: 2023-01-10

## 2023-01-10 VITALS
DIASTOLIC BLOOD PRESSURE: 88 MMHG | SYSTOLIC BLOOD PRESSURE: 132 MMHG | HEART RATE: 93 BPM | OXYGEN SATURATION: 97 % | WEIGHT: 288.4 LBS | HEIGHT: 71 IN | TEMPERATURE: 98.9 F | BODY MASS INDEX: 40.38 KG/M2 | RESPIRATION RATE: 14 BRPM

## 2023-01-10 DIAGNOSIS — E11.9 TYPE 2 DIABETES MELLITUS WITHOUT COMPLICATION, WITHOUT LONG-TERM CURRENT USE OF INSULIN (HCC): ICD-10-CM

## 2023-01-10 DIAGNOSIS — E66.01 OBESITY, CLASS III, BMI 40-49.9 (MORBID OBESITY) (HCC): ICD-10-CM

## 2023-01-10 DIAGNOSIS — Z12.11 SCREENING FOR COLON CANCER: Primary | ICD-10-CM

## 2023-01-10 DIAGNOSIS — F31.31 BIPOLAR AFFECTIVE DISORDER, CURRENTLY DEPRESSED, MILD (HCC): ICD-10-CM

## 2023-01-10 PROBLEM — I73.9 PVD (PERIPHERAL VASCULAR DISEASE) (HCC): Status: RESOLVED | Noted: 2022-05-24 | Resolved: 2023-01-10

## 2023-01-10 LAB
HBA1C MFR BLD: 9.2 % (ref 4.3–5.7)
MICROALB/CREAT RATIO POC: ABNORMAL MG/G
MICROALBUMIN/CREAT UR-RTO: 10 MG/L
POC CREATININE: 50 MG/DL

## 2023-01-10 RX ORDER — LANCETS
1 EACH MISCELLANEOUS DAILY
Qty: 100 EACH | Refills: 3 | Status: SHIPPED | OUTPATIENT
Start: 2023-01-10

## 2023-01-10 RX ORDER — BLOOD-GLUCOSE METER
1 EACH MISCELLANEOUS DAILY
Qty: 1 KIT | Refills: 0 | Status: SHIPPED | OUTPATIENT
Start: 2023-01-10

## 2023-01-10 ASSESSMENT — PATIENT HEALTH QUESTIONNAIRE - PHQ9
1. LITTLE INTEREST OR PLEASURE IN DOING THINGS: 0
5. POOR APPETITE OR OVEREATING: 0
3. TROUBLE FALLING OR STAYING ASLEEP: 3
SUM OF ALL RESPONSES TO PHQ QUESTIONS 1-9: 4
SUM OF ALL RESPONSES TO PHQ QUESTIONS 1-9: 4
4. FEELING TIRED OR HAVING LITTLE ENERGY: 1
8. MOVING OR SPEAKING SO SLOWLY THAT OTHER PEOPLE COULD HAVE NOTICED. OR THE OPPOSITE, BEING SO FIGETY OR RESTLESS THAT YOU HAVE BEEN MOVING AROUND A LOT MORE THAN USUAL: 0
9. THOUGHTS THAT YOU WOULD BE BETTER OFF DEAD, OR OF HURTING YOURSELF: 0
SUM OF ALL RESPONSES TO PHQ9 QUESTIONS 1 & 2: 0
7. TROUBLE CONCENTRATING ON THINGS, SUCH AS READING THE NEWSPAPER OR WATCHING TELEVISION: 0
2. FEELING DOWN, DEPRESSED OR HOPELESS: 0
6. FEELING BAD ABOUT YOURSELF - OR THAT YOU ARE A FAILURE OR HAVE LET YOURSELF OR YOUR FAMILY DOWN: 0
SUM OF ALL RESPONSES TO PHQ QUESTIONS 1-9: 4
SUM OF ALL RESPONSES TO PHQ QUESTIONS 1-9: 4

## 2023-01-10 NOTE — PROGRESS NOTES
[facility] Medicine at / [address] [facility]  [address] [facility]. Mary [address]  Chief Complaint   Patient presents with    Diabetes         History obtained from chart review and the patient. SUBJECTIVE:  Ekta Hubbard is a 47 y.o. male that presents today for DM follow up    Diabetes Type 2    Glucose control:   Does patient check blood glucoses at home? Yes - improving, down to the 180-200  Report of hypoglycemia: no  Lab Results   Component Value Date    LABA1C 9.2 (H) 01/10/2023     No results found for: EAG    Symptoms  Polyuria, Polydipsia or Polyphagia? No  Chest Pain, SOB, or Palpitations? -  No  New Vision complaints? No  Paresthesias of the extremities? In legs/feet (chronic)    Medications  Current medication were reviewed. Compliant with medications? Yes Mounjaro Metformin 1000 mg bid  Medication side effects? No  On ACE-I or ARB? No  On antiplatelet therapy? No  On Statin? No    Last Diabetic Eye Exam: needs    Exercise  Exercise? No  Wt Readings from Last 3 Encounters:   01/10/23 288 lb 6.4 oz (130.8 kg)   11/22/22 289 lb 6.4 oz (131.3 kg)   06/08/22 298 lb 9.6 oz (135.4 kg)       Diet discipline?:  Low salt, fat, sugar diet? Yes, decent    Blood pressure control:  BP Readings from Last 3 Encounters:   01/10/23 132/88   11/22/22 126/86   06/08/22 130/86       No results found for: Mago Knight    Lab Results   Component Value Date    [address] 85 01/07/2023     Bipolar Disorder  Following with [facility] for bipolar. Moods are decent. Taking Lamictal and Seroquel.     Age/Gender Health Maintenance    Lipid -   No results found for: CHOL  No results found for: TRIG  Lab Results   Component Value Date    HDL 37 01/07/2023    HDL 45 02/12/2021     Lab Results   Component Value Date    LDLCALC 85 01/07/2023    [address] 106 02/12/2021     DM Screen -   Lab Results   Component Value Date    LABA1C 9.2 (H) 01/10/2023     Colon Cancer Screening - cologuard ordered 1/10/23  Lung Cancer Screening (Age 54 to 80 with 30 pack year hx, current smoker or quit within past 15 years) - 55    Tetanus - needs  Influenza Vaccine - yearly  Pneumonia Vaccine - 65  Zostavax - 50     PSA testing discussion - 55  AAA Screening - 65    Falls screening - n/a    Current Outpatient Medications   Medication Sig Dispense Refill    Blood Glucose Monitoring Suppl (ONE TOUCH ULTRA 2) w/Device KIT 1 kit by Does not apply route daily 1 kit 0    ONE TOUCH ULTRASOFT LANCETS MISC 1 each by Does not apply route daily 100 each 3    blood glucose test strips (ASCENSIA AUTODISC VI;ONE TOUCH ULTRA TEST VI) strip 1 each by In Vitro route daily As needed. 100 each 3    Tirzepatide (MOUNJARO) 5 MG/0.5ML SOPN SC injection Inject 0.5 mLs into the skin once a week for 4 doses 2 mL 0    gabapentin (NEURONTIN) 800 MG tablet take 1 tablet by mouth four times a day 360 tablet 1    baclofen (LIORESAL) 20 MG tablet take 1 tablet by mouth three times a day 90 tablet 5    metFORMIN (GLUCOPHAGE-XR) 500 MG extended release tablet Take 2 tablets by mouth in the morning and at bedtime 360 tablet 1    metoprolol succinate (TOPROL XL) 50 MG extended release tablet take 1 tablet by mouth once daily 90 tablet 1    busPIRone (BUSPAR) 15 MG tablet take 1 tablet by mouth three times a day 270 tablet 1    hydroCHLOROthiazide (HYDRODIURIL) 12.5 MG tablet take 1 tablet by mouth once daily 90 tablet 3    fluticasone (FLONASE) 50 MCG/ACT nasal spray 1 spray by Each Nostril route daily 32 g 1    oxyCODONE HCl (OXY-IR) 10 MG immediate release tablet take 1 tablet by mouth three times a day if needed for pain      Handicap Placard MISC by Does not apply route Expires 2/17/2026 1 each 0    Elastic Bandages & Supports (KNEE BRACE/HINGED/REGULAR) MISC 1 Device by Does not apply route daily 1 each 0    lamoTRIgine (LAMICTAL) 25 MG tablet       NARCAN 4 MG/0.1ML LIQD nasal spray       QUEtiapine (SEROQUEL) 100 MG tablet Take 100 mg by mouth nightly      vitamin E 400 UNIT capsule Take 1  capsule by mouth 2 times daily 60 capsule 3     No current facility-administered medications for this visit. Orders Placed This Encounter   Medications    Blood Glucose Monitoring Suppl (ONE TOUCH ULTRA 2) w/Device KIT     Si kit by Does not apply route daily     Dispense:  1 kit     Refill:  0    ONE TOUCH ULTRASOFT LANCETS MISC     Si each by Does not apply route daily     Dispense:  100 each     Refill:  3    blood glucose test strips (ASCENSIA AUTODISC VI;ONE TOUCH ULTRA TEST VI) strip     Si each by In Vitro route daily As needed. Dispense:  100 each     Refill:  3    Tirzepatide (MOUNJARO) 5 MG/0.5ML SOPN SC injection     Sig: Inject 0.5 mLs into the skin once a week for 4 doses     Dispense:  2 mL     Refill:  0           All medications reviewed and reconciled, including OTC and herbal medications. Updated list given to patient.        Patient Active Problem List   Diagnosis    Herniated lumbar intervertebral disc    Primary osteoarthritis of right knee    Cervical spinal stenosis    Degenerative lumbar spinal stenosis    Bipolar affective disorder, currently depressed, mild (HCC)    Cigarette nicotine dependence without complication    Tachycardia    Essential hypertension    Anxiety    Dependent edema    Edema of both lower extremities due to peripheral venous insufficiency    Type 2 diabetes mellitus without complication, without long-term current use of insulin (HCC)       Past Medical History:   Diagnosis Date    Bipolar disorder with depression ([facility] [address]     Cervical spinal stenosis     Cigarette nicotine dependence without complication 3675    Degenerative lumbar spinal stenosis 2021    Essential hypertension     Herniated lumbar intervertebral disc 2021    Primary osteoarthritis of right knee 2021    Tachycardia        Past Surgical History:   Procedure Laterality Date    KNEE SURGERY         Allergies   Allergen Reactions    Tylenol [Acetaminophen]     Celebrex [Celecoxib] Rash       Social History     Socioeconomic History    Marital status: Single     Spouse name: Not on file    Number of children: Not on file    Years of education: Not on file    Highest education level: Not on file   Occupational History    Not on file   Tobacco Use    Smoking status: Every Day     Packs/day: 1.00     Types: Cigarettes     Start date:      Last attempt to quit: 2021     Years since quittin.8    Smokeless tobacco: Never   Substance and Sexual Activity    Alcohol use: Never    Drug use: Never    Sexual activity: Not on file   Other Topics Concern    Not on file   Social History Narrative    Not on file     Social Determinants of Health     Financial Resource Strain: Low Risk     Difficulty of Paying Living Expenses: Not hard at all   Food Insecurity: No Food Insecurity    Worried About Running Out of Food in the Last Year: Never true    Ran Out of Food in the Last Year: Never true   Transportation Needs: Not on file   Physical Activity: Unknown    Days of Exercise per Week: Not on file    Minutes of Exercise per Session: 0 min   Stress: Not on file   Social Connections: Not on file   Intimate Partner Violence: Not on file   Housing Stability: Not on file       Family History   Problem Relation Age of Onset    Colon Cancer Neg Hx     Esophageal Cancer Neg Hx     Liver Cancer Neg Hx     Rectal Cancer Neg Hx     Stomach Cancer Neg Hx          I have reviewed the patient's past medical history, past surgical history, allergies, medications, social and family history and I have made updates where appropriate.       Review of Systems  Positive responses are highlighted in bold    Constitutional:  Fever, Chills, Night Sweats, Fatigue, Unexpected changes in weight  Eyes:  Eye discharge, Eye pain, Eye redness, Visual disturbances   HENT:  Ear pain, Tinnitus, Nosebleeds, Trouble swallowing, Hearing loss, Sore throat  Cardiovascular:  Chest Pain, Palpitations, Orthopnea, Paroxysmal Nocturnal Dyspnea  Respiratory:  Cough, Wheezing, Shortness of breath, Chest tightness, Apnea  Gastrointestinal:  Nausea, Vomiting, Diarrhea, Constipation, Heartburn, Blood in stool  Genitourinary:  Difficulty or painful urination, Flank pain, Change in frequency, Urgency  Skin:  Color change, Rash, Itching, Wound  Psychiatric:  Hallucinations, Anxiety, Depression, Suicidal ideation  Hematological:  Enlarged glands, Easy bleeding, Easily bruising  Musculoskeletal:  Joint pain, Back pain, Gait problems, Joint swelling, Myalgias  Neurological:  Dizziness, Headaches, Presyncope, Numbness, Seizures, Tremors  Allergy:  Environmental allergies, Food allergies  Endocrine:  Heat Intolerance, Cold Intolerance, Polydipsia, Polyphagia, Polyuria    Lab Results   Component Value Date     01/07/2023    K 4.4 01/07/2023     01/07/2023    CO2 24 01/07/2023    BUN 11 01/07/2023    CREATININE 50 01/10/2023    GLUCOSE 161 (H) 01/07/2023    CALCIUM 8.8 01/07/2023    PROT 6.4 01/07/2023    LABALBU 4.0 01/07/2023    BILITOT 0.4 01/07/2023    ALKPHOS 117 01/07/2023    AST 38 01/07/2023    ALT 83 (H) 01/07/2023    LABGLOM >60 01/07/2023     Lab Results   Component Value Date    WBC 14.3 (H) 01/07/2023    HGB 16.7 01/07/2023    HCT 50.7 01/07/2023    MCV 90.4 01/07/2023     01/07/2023     Lab Results   Component Value Date    TSH 3.120 02/12/2021     Left shoulder xray 6/7/22  1. Degenerative change involving the acromioclavicular joint. 2. Otherwise negative left shoulder radiographs. Right shoulder xray 6/7/22  Mild glenohumeral and acromioclavicular joint space narrowing and marginal spurring is observed. No fracture or dislocation is identified. No focal bony lesion is present. The visualized soft tissues are unremarkable. Hip xrays 6/7/22  1. Degenerative change involving the right and left hip and sacroiliac joints. 2. Probable geode in the right acetabulum.    3. No fracture or other bony abnormality noted. .         Left knee xray 6/7/22  No fracture or acute bony abnormality noted in either knee. Moderate to severe chronic arthritic changes noted in the right knee and mild to moderate chronic arthritic changes noted in the left knee. Right knee xray 6/7/22  No fracture or acute bony abnormality noted in either knee. Moderate to severe chronic arthritic changes noted in the right knee and mild to moderate chronic arthritic changes noted in the left knee. Lumbar spine xrays 6/7/22      The lumbar vertebral bodies are normally aligned. There are no compression fractures. There is disc space narrowing especially at L5-S1 and L1-2. Lucyann Push The facet joints are within normal limits. No suspicious osseous lesions are present. The sacrum and    sacroiliac joints appear normal.  No paraspinal abnormality is noted. Thoracic xray 6/7/22  1. Degenerative changes in the mid to lower thoracic spine with disc space narrowing and spurring. 2. No fracture or other bony abnormality noted. .     Cervical spine xray 6/7/22  1. Straightening of the normal cervical lordosis and degenerative change especially at C5-6 and C6-7.   2. C7 is poorly visualized on the lateral view. 3. Otherwise negative cervical spine series. .           PHYSICAL EXAM:  Vitals:    01/10/23 1001   BP: 132/88   Pulse: 93   Resp: 14   Temp: 98.9 °F (37.2 °C)   TempSrc: Oral   SpO2: 97%   Weight: 288 lb 6.4 oz (130.8 kg)   Height: 5' 11\" (1.803 m)       Body mass index is 40.22 kg/m².          VS Reviewed  General Appearance: A&O x 3, No acute distress,well developed and well- nourished  Head: normocephalic and atraumatic  Eyes: pupils equal, round, and reactive to light, extraocular eye movements intact, conjunctivae and eye lids without erythema  Neck: supple and non-tender without mass, no thyromegaly or thyroid nodules, no cervical lymphadenopathy  Pulmonary/Chest: clear to auscultation bilaterally- no wheezes, rales or rhonchi, normal air movement, no respiratory distress or retractions  Cardiovascular: S1 and S2 auscultated w/ RRR. No murmurs, rubs, clicks, or gallops, distal pulses intact. Abdomen: soft, non-tender, non-distended, bowl sounds physiologic,  no rebound or guarding, no masses or hernias noted. Liver and spleen without enlargement. Extremities: warm and dry, DP/PT 2+ bilaterally, trace edema  Musculoskeletal: No joint swelling or gross deformity   Neuro:  Alert, 5/5 strength globally and symmetrically  Psych: Affect appropriate. Mood normal. Thought process is normal without evidence of depression or psychosis. Good insight and appropriate interaction. Cognition and memory appear to be intact. Skin: warm and dry, no rash  Lymph:  No cervical, auricular or supraclavicular lymph nodes palpated    ASSESSMENT & PLAN  Chrystine Goldmann was seen today for diabetes. Diagnoses and all orders for this visit:    Screening for colon cancer  -     Fecal DNA Colorectal cancer screening (Colog[facility]); Future    Bipolar affective disorder, currently depressed, mild ([address]    Type 2 diabetes mellitus without complication, without long-term current use of insulin ([facility])  -     POCT glycosylated hemoglobin (Hb A1C)  -     POCT microalbumin  -     Blood Glucose Monitoring Suppl (ONE TOUCH ULTRA 2) w/Device KIT; 1 kit by Does not apply route daily  -     ONE TOUCH ULTRASOFT LANCETS MISC; 1 each by Does not apply route daily  -     blood glucose test strips (ASCENSIA AUTODISC VI;ONE TOUCH ULTRA TEST VI) strip; 1 each by In Vitro route daily As needed. -     Tirzepatide (MOUNJARO) 5 MG/0.5ML SOPN SC injection;  Inject 0.5 mLs into the skin once a week for 4 doses    Obesity, Class III, BMI 40-49.9 (morbid obesity) ([facility])    - A1C improved, down to 9.2  - work on diet/exercise and weight loss  - increase Mounjaro dose to 5 mg which should help with both weight and DM  - con't Metformin 1000 mg bid  - agreeable to landen  - following with Miladis for bipolar disorder    DISPOSITION    Return in about 3 months (around 4/10/2023) for diabetes. Eloina Younger released without restrictions. PATIENT COUNSELING    Counseling was provided today regarding the following topics: Healthy eating habits, Regular exercise, substance abuse and healthy sleep habits. Eloina Younger received counseling on the following healthy behaviors: medication adherence and tobacco cessation    Patient given educational materials on: See Attached    I have instructed Eloina Younger to complete a self tracking handout on none and instructed them to bring it with them to his next appointment. Barriers to learning and self management: none    Discussed use, benefit, and side effects of prescribed medications. Barriers to medication compliance addressed. All patient questions answered. Pt voiced understanding.        Electronically signed by KATHLEEN Patterson CNP on 1/10/2023 at 10:30 AM

## 2023-01-13 ENCOUNTER — TELEPHONE (OUTPATIENT)
Dept: FAMILY MEDICINE CLINIC | Age: 53
End: 2023-01-13

## 2023-01-13 DIAGNOSIS — E11.9 TYPE 2 DIABETES MELLITUS WITHOUT COMPLICATION, WITHOUT LONG-TERM CURRENT USE OF INSULIN (HCC): Primary | ICD-10-CM

## 2023-01-13 NOTE — TELEPHONE ENCOUNTER
Patient called in stating he was told to call office if he had any trouble filling his Mounjaro 5mg ( d/t shortage)     He states [facility] doesn't have the 5mg in stock but has the 7.5mg in stock. Was wondering if you could sent in an RX for the 7.5mg.      Uses [facility] on [address] [facility]

## 2023-02-02 ENCOUNTER — TELEPHONE (OUTPATIENT)
Dept: FAMILY MEDICINE CLINIC | Age: 53
End: 2023-02-02

## 2023-02-02 NOTE — TELEPHONE ENCOUNTER
----- Message from KATHLEEN Tejeda CNP sent at 2/2/2023  3:26 PM EST -----  Let Cha Uribe know his cologuard was negative.  Rec'd repeating 3 years

## 2023-02-28 ENCOUNTER — TELEPHONE (OUTPATIENT)
Dept: FAMILY MEDICINE CLINIC | Age: 53
End: 2023-02-28

## 2023-02-28 DIAGNOSIS — E11.9 TYPE 2 DIABETES MELLITUS WITHOUT COMPLICATION, WITHOUT LONG-TERM CURRENT USE OF INSULIN (HCC): Primary | ICD-10-CM

## 2023-02-28 NOTE — TELEPHONE ENCOUNTER
Next dose is 10mg x 4 wks, then 12.5mg x 4 wks, then 15mg     Diagnosis Orders   1. Type 2 diabetes mellitus without complication, without long-term current use of insulin (HCC)  Tirzepatide (MOUNJARO) 10 MG/0.5ML SOPN SC injection

## 2023-03-22 DIAGNOSIS — E11.9 TYPE 2 DIABETES MELLITUS WITHOUT COMPLICATION, WITHOUT LONG-TERM CURRENT USE OF INSULIN (HCC): ICD-10-CM

## 2023-03-22 NOTE — TELEPHONE ENCOUNTER
Recent Visits  Date Type Provider Dept   01/10/23 Office Visit KATHLEEN Zarco CNP [facility]   11/22/22 Office Visit KATHLEEN Zarco CNP [facility]   06/08/22 Office Visit KATHLEEN Zarco CNP [facility]   05/24/22 Office Visit KATHLEEN Zarco CNP [facility]   Showing recent visits within past 540 days with a meds authorizing provider and meeting all other requirements  Future Appointments  Date Type Provider Dept   04/11/23 Appointment KATHLEEN Zarco CNP [facility]   Showing future appointments within next 150 days with a meds authorizing provider and meeting all other requirements    Future Appointments   Date Time Provider [place]   4/11/2023 10:00 AM Angel Zarco

## 2023-03-23 RX ORDER — TIRZEPATIDE 10 MG/.5ML
INJECTION, SOLUTION SUBCUTANEOUS
Qty: 2 ML | Refills: 0 | OUTPATIENT
Start: 2023-03-23

## 2023-04-27 DIAGNOSIS — E11.9 TYPE 2 DIABETES MELLITUS WITHOUT COMPLICATION, WITHOUT LONG-TERM CURRENT USE OF INSULIN (HCC): ICD-10-CM

## 2023-04-27 RX ORDER — METFORMIN HYDROCHLORIDE 500 MG/1
TABLET, EXTENDED RELEASE ORAL
Qty: 360 TABLET | Refills: 1 | Status: SHIPPED | OUTPATIENT
Start: 2023-04-27

## 2023-04-27 NOTE — TELEPHONE ENCOUNTER
Recent Visits  Date Type Provider Dept   01/10/23 Office Visit KAHTLEEN Villavicencio CNP [facility]   11/22/22 Office Visit KATHLEEN Villavicencio CNP [facility]   06/08/22 Office Visit KATHLEEN Villavicencio CNP [facility]   05/24/22 Office Visit KATHLEEN Villavicencio CNP [facility]   Showing recent visits within past 540 days with a meds authorizing provider and meeting all other requirements  Future Appointments  No visits were found meeting these conditions. Showing future appointments within next 150 days with a meds authorizing provider and meeting all other requirements      No future appointments.

## 2023-05-03 DIAGNOSIS — E11.9 TYPE 2 DIABETES MELLITUS WITHOUT COMPLICATION, WITHOUT LONG-TERM CURRENT USE OF INSULIN (HCC): ICD-10-CM

## 2023-05-04 RX ORDER — TIRZEPATIDE 12.5 MG/.5ML
INJECTION, SOLUTION SUBCUTANEOUS
Qty: 2 ML | Refills: 0 | OUTPATIENT
Start: 2023-05-04

## 2023-05-04 NOTE — TELEPHONE ENCOUNTER
Recent Visits  Date Type Provider Dept   01/10/23 Office Visit KATHLEEN Maza CNP [facility]   11/22/22 Office Visit KATHLEEN Maza CNP [facility]   06/08/22 Office Visit KATHLEEN Maza CNP [facility]   05/24/22 Office Visit KATHLEEN Maza CNP [facility]   Showing recent visits within past 540 days with a meds authorizing provider and meeting all other requirements  Future Appointments  No visits were found meeting these conditions. Showing future appointments within next 150 days with a meds authorizing provider and meeting all other requirements      No future appointments.

## 2023-09-05 RX ORDER — TIRZEPATIDE 15 MG/.5ML
INJECTION, SOLUTION SUBCUTANEOUS
Qty: 2 ML | Refills: 2 | OUTPATIENT
Start: 2023-09-05

## 2023-09-05 NOTE — TELEPHONE ENCOUNTER
Recent Visits  Date Type Provider Dept   01/10/23 Office Visit KATHLEEN Myers CNP [facility]   11/22/22 Office Visit KATHLEEN Myers CNP [facility]   06/08/22 Office Visit KATHLEEN Myers CNP [facility]   05/24/22 Office Visit KATHLEEN Myers CNP [facility]   Showing recent visits within past 540 days with a meds authorizing provider and meeting all other requirements  Future Appointments  No visits were found meeting these conditions.   Showing future appointments within next 150 days with a meds authorizing provider and meeting all other requirements

## 2023-09-06 ENCOUNTER — OFFICE VISIT (OUTPATIENT)
Dept: FAMILY MEDICINE CLINIC | Age: 53
End: 2023-09-06
Payer: MEDICARE

## 2023-09-06 VITALS
TEMPERATURE: 98.2 F | RESPIRATION RATE: 12 BRPM | OXYGEN SATURATION: 98 % | DIASTOLIC BLOOD PRESSURE: 82 MMHG | BODY MASS INDEX: 32.7 KG/M2 | WEIGHT: 233.6 LBS | SYSTOLIC BLOOD PRESSURE: 124 MMHG | HEART RATE: 100 BPM | HEIGHT: 71 IN

## 2023-09-06 DIAGNOSIS — E11.9 TYPE 2 DIABETES MELLITUS WITHOUT COMPLICATION, WITHOUT LONG-TERM CURRENT USE OF INSULIN (HCC): Primary | ICD-10-CM

## 2023-09-06 LAB — HBA1C MFR BLD: 5.4 % (ref 4.3–5.7)

## 2023-09-06 PROCEDURE — 4004F PT TOBACCO SCREEN RCVD TLK: CPT | Performed by: NURSE PRACTITIONER

## 2023-09-06 PROCEDURE — 3079F DIAST BP 80-89 MM HG: CPT | Performed by: NURSE PRACTITIONER

## 2023-09-06 PROCEDURE — 2022F DILAT RTA XM EVC RTNOPTHY: CPT | Performed by: NURSE PRACTITIONER

## 2023-09-06 PROCEDURE — G8417 CALC BMI ABV UP PARAM F/U: HCPCS | Performed by: NURSE PRACTITIONER

## 2023-09-06 PROCEDURE — 3044F HG A1C LEVEL LT 7.0%: CPT | Performed by: NURSE PRACTITIONER

## 2023-09-06 PROCEDURE — G8427 DOCREV CUR MEDS BY ELIG CLIN: HCPCS | Performed by: NURSE PRACTITIONER

## 2023-09-06 PROCEDURE — 99214 OFFICE O/P EST MOD 30 MIN: CPT | Performed by: NURSE PRACTITIONER

## 2023-09-06 PROCEDURE — 3074F SYST BP LT 130 MM HG: CPT | Performed by: NURSE PRACTITIONER

## 2023-09-06 PROCEDURE — 3017F COLORECTAL CA SCREEN DOC REV: CPT | Performed by: NURSE PRACTITIONER

## 2023-09-06 SDOH — ECONOMIC STABILITY: HOUSING INSECURITY
IN THE LAST 12 MONTHS, WAS THERE A TIME WHEN YOU DID NOT HAVE A STEADY PLACE TO SLEEP OR SLEPT IN A SHELTER (INCLUDING NOW)?: NO

## 2023-09-06 SDOH — ECONOMIC STABILITY: FOOD INSECURITY: WITHIN THE PAST 12 MONTHS, YOU WORRIED THAT YOUR FOOD WOULD RUN OUT BEFORE YOU GOT MONEY TO BUY MORE.: NEVER TRUE

## 2023-09-06 SDOH — ECONOMIC STABILITY: INCOME INSECURITY: HOW HARD IS IT FOR YOU TO PAY FOR THE VERY BASICS LIKE FOOD, HOUSING, MEDICAL CARE, AND HEATING?: NOT HARD AT ALL

## 2023-09-06 SDOH — ECONOMIC STABILITY: FOOD INSECURITY: WITHIN THE PAST 12 MONTHS, THE FOOD YOU BOUGHT JUST DIDN'T LAST AND YOU DIDN'T HAVE MONEY TO GET MORE.: NEVER TRUE

## 2023-09-06 NOTE — PROGRESS NOTES
spleen without enlargement. Extremities: warm and dry, DP/PT 2+ bilaterally  Visual inspection:  Deformity/amputation: absent  Skin lesions/pre-ulcerative calluses: absent  Edema: right- negative, left- negative  Sensory exam:  Monofilament sensation: abnormal - decreased  (minimum of 5 random plantar locations tested, avoiding callused areas - > 1 area with absence of sensation is + for neuropathy)  Plus at least one of the following:  Pulses: normal,   Pinprick: Impaired  Proprioception: Impaired  Musculoskeletal: No joint swelling or gross deformity   Neuro:  Alert, 5/5 strength globally and symmetrically  Psych: Affect appropriate. Mood normal. Thought process is normal without evidence of depression or psychosis. Good insight and appropriate interaction. Cognition and memory appear to be intact. Skin: warm and dry, no rash  Lymph:  No cervical, auricular or supraclavicular lymph nodes palpated    ASSESSMENT & PLAN  Soniya Gomez was seen today for diabetes. Diagnoses and all orders for this visit:    Type 2 diabetes mellitus without complication, without long-term current use of insulin ([facility])  -      DIABETES FOOT EXAM  -     POCT glycosylated hemoglobin (Hb A1C)  -     Tirzepatide (MOUNJARO) 15 MG/0.5ML SOPN SC injection; Inject 0.5 mLs into the skin once a week      - A1C 5.4 down from 9.2 which is excellent  - has also lost 55 pounds since January  - con't working on diet/exercise and weight loss  - con't Mounjaro 15 mg  - con't Metformin 1000 mg bid    DISPOSITION    Return in about 3 months (around 12/6/2023) for AWV. Soniya Gomez released without restrictions. PATIENT COUNSELING    Counseling was provided today regarding the following topics: Healthy eating habits, Regular exercise, substance abuse and healthy sleep habits.     Soniya Gomez received counseling on the following healthy behaviors: medication adherence and tobacco cessation    Patient given educational materials on: See Attached    I have

## 2023-09-15 DIAGNOSIS — I10 ESSENTIAL HYPERTENSION: ICD-10-CM

## 2023-09-15 DIAGNOSIS — R00.0 TACHYCARDIA: ICD-10-CM

## 2023-09-15 RX ORDER — METOPROLOL SUCCINATE 50 MG/1
TABLET, EXTENDED RELEASE ORAL
Qty: 90 TABLET | Refills: 1 | Status: SHIPPED | OUTPATIENT
Start: 2023-09-15

## 2023-09-15 NOTE — TELEPHONE ENCOUNTER
Recent Visits  Date Type Provider Dept   09/06/23 Office Visit KATHLEEN David CNP [facility]   01/10/23 Office Visit KATHLEEN David CNP [facility]   11/22/22 Office Visit Reta KATHLEEN Soni CNP [facility]   06/08/22 Office Visit Reta KATHLEEN Soni CNP [facility]   05/24/22 Office Visit KATLHEEN David CNP [facility]   Showing recent visits within past 540 days with a meds authorizing provider and meeting all other requirements  Future Appointments  Date Type Provider Dept   12/06/23 Appointment Reta KATHLEEN Soni CNP [facility]   Showing future appointments within next 150 days with a meds authorizing provider and meeting all other requirements     Future Appointments   Date Time Provider [address]   12/6/2023 10:40 AM KATHLEEN David

## 2023-10-04 ENCOUNTER — APPOINTMENT (OUTPATIENT)
Dept: CT IMAGING | Age: 53
End: 2023-10-04
Payer: MEDICARE

## 2023-10-04 ENCOUNTER — HOSPITAL ENCOUNTER (EMERGENCY)
Age: 53
Discharge: HOME | End: 2023-10-04
Attending: EMERGENCY MEDICINE
Payer: MEDICARE

## 2023-10-04 ENCOUNTER — APPOINTMENT (OUTPATIENT)
Dept: GENERAL RADIOLOGY | Age: 53
End: 2023-10-04
Payer: MEDICARE

## 2023-10-04 VITALS
DIASTOLIC BLOOD PRESSURE: 72 MMHG | HEIGHT: 71 IN | OXYGEN SATURATION: 98 % | TEMPERATURE: 97.9 F | SYSTOLIC BLOOD PRESSURE: 97 MMHG | WEIGHT: 230 LBS | RESPIRATION RATE: 16 BRPM | BODY MASS INDEX: 32.2 KG/M2 | HEART RATE: 84 BPM

## 2023-10-04 DIAGNOSIS — M51.26 HERNIATED LUMBAR INTERVERTEBRAL DISC: ICD-10-CM

## 2023-10-04 DIAGNOSIS — R55 SYNCOPE AND COLLAPSE: Primary | ICD-10-CM

## 2023-10-04 DIAGNOSIS — E04.1 LEFT THYROID NODULE: ICD-10-CM

## 2023-10-04 DIAGNOSIS — M48.061 DEGENERATIVE LUMBAR SPINAL STENOSIS: ICD-10-CM

## 2023-10-04 LAB
ALBUMIN SERPL BCG-MCNC: 3.3 G/DL (ref 3.5–5.1)
ALP SERPL-CCNC: 82 U/L (ref 38–126)
ALT SERPL W/O P-5'-P-CCNC: 20 U/L (ref 11–66)
AMMONIA PLAS-MCNC: 42 UMOL/L (ref 11–60)
AMPHETAMINES UR QL SCN: NEGATIVE
ANION GAP SERPL CALC-SCNC: 12 MEQ/L (ref 8–16)
APTT PPP: 29.7 SECONDS (ref 22–38)
ARTERIAL PATENCY WRIST A: POSITIVE
AST SERPL-CCNC: 18 U/L (ref 5–40)
BARBITURATES UR QL SCN: NEGATIVE
BASE EXCESS BLDA CALC-SCNC: -3.4 MMOL/L (ref -2.5–2.5)
BASOPHILS ABSOLUTE: 0.1 THOU/MM3 (ref 0–0.1)
BASOPHILS NFR BLD AUTO: 0.6 %
BDY SITE: ABNORMAL
BENZODIAZ UR QL SCN: NEGATIVE
BILIRUB CONJ SERPL-MCNC: < 0.2 MG/DL (ref 0–0.3)
BILIRUB SERPL-MCNC: 0.9 MG/DL (ref 0.3–1.2)
BUN SERPL-MCNC: 5 MG/DL (ref 7–22)
BZE UR QL SCN: NEGATIVE
CA-I BLD ISE-SCNC: 1.18 MMOL/L (ref 1.12–1.32)
CALCIUM SERPL-MCNC: 8.7 MG/DL (ref 8.5–10.5)
CANNABINOIDS UR QL SCN: NEGATIVE
CHLORIDE BLD-SCNC: 110 MEQ/L (ref 98–109)
CHLORIDE SERPL-SCNC: 106 MEQ/L (ref 98–111)
CK SERPL-CCNC: 58 U/L (ref 55–170)
CO2 SERPL-SCNC: 20 MEQ/L (ref 23–33)
COLLECTED BY:: ABNORMAL
CREAT SERPL-MCNC: 1.2 MG/DL (ref 0.4–1.2)
DEPRECATED RDW RBC AUTO: 41 FL (ref 35–45)
DEVICE: ABNORMAL
EOSINOPHIL NFR BLD AUTO: 0.2 %
EOSINOPHILS ABSOLUTE: 0 THOU/MM3 (ref 0–0.4)
ERYTHROCYTE [DISTWIDTH] IN BLOOD BY AUTOMATED COUNT: 13.4 % (ref 11.5–14.5)
ETHANOL SERPL-MCNC: < 0.01 %
FENTANYL: NEGATIVE
GFR SERPL CREATININE-BSD FRML MDRD: > 60 ML/MIN/1.73M2
GLUCOSE BLD-MCNC: 120 MG/DL (ref 70–108)
GLUCOSE SERPL-MCNC: 137 MG/DL (ref 70–108)
HCO3 BLDA-SCNC: 21 MMOL/L (ref 23–28)
HCT VFR BLD AUTO: 54.3 % (ref 42–52)
HGB BLD-MCNC: 18.6 GM/DL (ref 14–18)
IMM GRANULOCYTES # BLD AUTO: 0.15 THOU/MM3 (ref 0–0.07)
IMM GRANULOCYTES NFR BLD AUTO: 0.7 %
INR PPP: 1.02 (ref 0.85–1.13)
LACTATE BLD-SCNC: 1.3 MMOL/L (ref 0.5–1.9)
LACTATE SERPL-SCNC: 1.8 MMOL/L (ref 0.5–2)
LYMPHOCYTES ABSOLUTE: 4.5 THOU/MM3 (ref 1–4.8)
LYMPHOCYTES NFR BLD AUTO: 21.2 %
MAGNESIUM SERPL-MCNC: 2 MG/DL (ref 1.6–2.4)
MCH RBC QN AUTO: 29.2 PG (ref 26–33)
MCHC RBC AUTO-ENTMCNC: 34.3 GM/DL (ref 32.2–35.5)
MCV RBC AUTO: 85.4 FL (ref 80–94)
MONOCYTES ABSOLUTE: 1.4 THOU/MM3 (ref 0.4–1.3)
MONOCYTES NFR BLD AUTO: 6.4 %
NEUTROPHILS NFR BLD AUTO: 70.9 %
NRBC BLD AUTO-RTO: 0 /100 WBC
NT-PROBNP SERPL IA-MCNC: 92.3 PG/ML (ref 0–124)
OPIATES UR QL SCN: POSITIVE
OSMOLALITY SERPL CALC.SUM OF ELEC: 275.1 MOSMOL/KG (ref 275–300)
OXYCODONE: NEGATIVE
PCO2 BLDA: 34 MMHG (ref 35–45)
PCP UR QL SCN: NEGATIVE
PH BLDA: 7.39 [PH] (ref 7.35–7.45)
PLATELET # BLD AUTO: 347 THOU/MM3 (ref 130–400)
PMV BLD AUTO: 10.8 FL (ref 9.4–12.4)
PO2 BLDA: 83 MMHG (ref 71–104)
POC CREATININE WHOLE BLOOD: 1 MG/DL (ref 0.5–1.2)
POTASSIUM BLD-SCNC: 3.8 MEQ/L (ref 3.5–4.9)
POTASSIUM SERPL-SCNC: 4.1 MEQ/L (ref 3.5–5.2)
PROCALCITONIN SERPL IA-MCNC: 0.11 NG/ML (ref 0.01–0.09)
PROLACTIN SERPL-MCNC: 16.3 NG/ML
PROT SERPL-MCNC: 5.6 G/DL (ref 6.1–8)
RBC # BLD AUTO: 6.36 MILL/MM3 (ref 4.7–6.1)
SAO2 % BLDA: 96 %
SEGMENTED NEUTROPHILS ABSOLUTE COUNT: 15 THOU/MM3 (ref 1.8–7.7)
SODIUM BLD-SCNC: 141 MEQ/L (ref 138–146)
SODIUM SERPL-SCNC: 138 MEQ/L (ref 135–145)
TROPONIN, HIGH SENSITIVITY: 17 NG/L (ref 0–12)
TROPONIN, HIGH SENSITIVITY: 18 NG/L (ref 0–12)
TSH SERPL DL<=0.005 MIU/L-ACNC: 3.15 UIU/ML (ref 0.4–4.2)
WBC # BLD AUTO: 21.2 THOU/MM3 (ref 4.8–10.8)

## 2023-10-04 PROCEDURE — 72125 CT NECK SPINE W/O DYE: CPT

## 2023-10-04 PROCEDURE — 80307 DRUG TEST PRSMV CHEM ANLYZR: CPT

## 2023-10-04 PROCEDURE — 82077 ASSAY SPEC XCP UR&BREATH IA: CPT

## 2023-10-04 PROCEDURE — 82435 ASSAY OF BLOOD CHLORIDE: CPT

## 2023-10-04 PROCEDURE — 71045 X-RAY EXAM CHEST 1 VIEW: CPT

## 2023-10-04 PROCEDURE — 83735 ASSAY OF MAGNESIUM: CPT

## 2023-10-04 PROCEDURE — 87040 BLOOD CULTURE FOR BACTERIA: CPT

## 2023-10-04 PROCEDURE — 96372 THER/PROPH/DIAG INJ SC/IM: CPT

## 2023-10-04 PROCEDURE — 96361 HYDRATE IV INFUSION ADD-ON: CPT

## 2023-10-04 PROCEDURE — 85730 THROMBOPLASTIN TIME PARTIAL: CPT

## 2023-10-04 PROCEDURE — 36415 COLL VENOUS BLD VENIPUNCTURE: CPT

## 2023-10-04 PROCEDURE — 80076 HEPATIC FUNCTION PANEL: CPT

## 2023-10-04 PROCEDURE — 82330 ASSAY OF CALCIUM: CPT

## 2023-10-04 PROCEDURE — 6360000002 HC RX W HCPCS: Performed by: EMERGENCY MEDICINE

## 2023-10-04 PROCEDURE — 82803 BLOOD GASES ANY COMBINATION: CPT

## 2023-10-04 PROCEDURE — 82565 ASSAY OF CREATININE: CPT

## 2023-10-04 PROCEDURE — 99285 EMERGENCY DEPT VISIT HI MDM: CPT

## 2023-10-04 PROCEDURE — 82140 ASSAY OF AMMONIA: CPT

## 2023-10-04 PROCEDURE — 82550 ASSAY OF CK (CPK): CPT

## 2023-10-04 PROCEDURE — 2580000003 HC RX 258: Performed by: EMERGENCY MEDICINE

## 2023-10-04 PROCEDURE — 70450 CT HEAD/BRAIN W/O DYE: CPT

## 2023-10-04 PROCEDURE — 84146 ASSAY OF PROLACTIN: CPT

## 2023-10-04 PROCEDURE — 84484 ASSAY OF TROPONIN QUANT: CPT

## 2023-10-04 PROCEDURE — 84443 ASSAY THYROID STIM HORMONE: CPT

## 2023-10-04 PROCEDURE — 96360 HYDRATION IV INFUSION INIT: CPT

## 2023-10-04 PROCEDURE — 80048 BASIC METABOLIC PNL TOTAL CA: CPT

## 2023-10-04 PROCEDURE — 83605 ASSAY OF LACTIC ACID: CPT

## 2023-10-04 PROCEDURE — 84132 ASSAY OF SERUM POTASSIUM: CPT

## 2023-10-04 PROCEDURE — 85025 COMPLETE CBC W/AUTO DIFF WBC: CPT

## 2023-10-04 PROCEDURE — 85610 PROTHROMBIN TIME: CPT

## 2023-10-04 PROCEDURE — 83880 ASSAY OF NATRIURETIC PEPTIDE: CPT

## 2023-10-04 PROCEDURE — 82947 ASSAY GLUCOSE BLOOD QUANT: CPT

## 2023-10-04 PROCEDURE — 93005 ELECTROCARDIOGRAM TRACING: CPT | Performed by: EMERGENCY MEDICINE

## 2023-10-04 PROCEDURE — 84295 ASSAY OF SERUM SODIUM: CPT

## 2023-10-04 PROCEDURE — 84145 PROCALCITONIN (PCT): CPT

## 2023-10-04 PROCEDURE — 93010 ELECTROCARDIOGRAM REPORT: CPT | Performed by: INTERNAL MEDICINE

## 2023-10-04 RX ORDER — 0.9 % SODIUM CHLORIDE 0.9 %
259 INTRAVENOUS SOLUTION INTRAVENOUS ONCE
Status: COMPLETED | OUTPATIENT
Start: 2023-10-04 | End: 2023-10-04

## 2023-10-04 RX ORDER — BACLOFEN 20 MG/1
20 TABLET ORAL 3 TIMES DAILY
Qty: 90 TABLET | Refills: 5 | Status: SHIPPED | OUTPATIENT
Start: 2023-10-04

## 2023-10-04 RX ORDER — 0.9 % SODIUM CHLORIDE 0.9 %
1000 INTRAVENOUS SOLUTION INTRAVENOUS ONCE
Status: COMPLETED | OUTPATIENT
Start: 2023-10-04 | End: 2023-10-04

## 2023-10-04 RX ORDER — 0.9 % SODIUM CHLORIDE 0.9 %
30 INTRAVENOUS SOLUTION INTRAVENOUS ONCE
Status: DISCONTINUED | OUTPATIENT
Start: 2023-10-04 | End: 2023-10-04

## 2023-10-04 RX ORDER — ORPHENADRINE CITRATE 30 MG/ML
60 INJECTION INTRAMUSCULAR; INTRAVENOUS ONCE
Status: COMPLETED | OUTPATIENT
Start: 2023-10-04 | End: 2023-10-04

## 2023-10-04 RX ADMIN — SODIUM CHLORIDE 259 ML: 9 INJECTION, SOLUTION INTRAVENOUS at 19:15

## 2023-10-04 RX ADMIN — SODIUM CHLORIDE 1000 ML: 9 INJECTION, SOLUTION INTRAVENOUS at 17:59

## 2023-10-04 RX ADMIN — SODIUM CHLORIDE 1000 ML: 9 INJECTION, SOLUTION INTRAVENOUS at 17:32

## 2023-10-04 RX ADMIN — ORPHENADRINE CITRATE 60 MG: 60 INJECTION INTRAMUSCULAR; INTRAVENOUS at 20:06

## 2023-10-04 NOTE — ED NOTES
Pt call light on, pt requesting pain medication or a muscle relaxer to help with his back pain.      Dwayne Ledbetter RN  10/04/23 1935

## 2023-10-04 NOTE — ED NOTES
Pt provided pillows to place behind his back and legs for comfort. States he cannot sit well on the cart d/t pain, but d/t pt current BP sitting him in a chair is not advisable at this time either.  Respiratory at bedside for STANISLAW Sofia RN  10/04/23 [id number]

## 2023-10-04 NOTE — ED NOTES
Pt continues restful on cart w/family at bedside. States he needs to readjust himself on cart frequently d/t chronic back and leg pain.  Family at bedside     Emilia Alexander, [address] [place] [address]  10/04/23 [id number]

## 2023-10-04 NOTE — ED NOTES
Patient calling to report he has been having palpatations daily for last week. Reports they are intermittent, notices them most in am prior to taking metoprolol, but also notices when at rest. Reports doesn't notice them at all if he's active, like biking or walking or doing rehab exercises in his home status post rotator repair.    Denies any SOB different from baseline, denies chest pain. Just concerned about palpatations    Is taking medications as prescribed    Cardiologist was Dr Sadler, patient has follow up scheduled with Dr Martinez in February 2021.    Will route to on call cardilogist in Dr Martinez absence   Pt to rm [facility] per EMS for reported syncopal episode PTA. Family at bedside report pt walked into the house from being outside stating that he felt hot just prior to passing out. Reported pt fell into a door, unknown if he hit his head. Thry splashed him w/water to get him to wake up. On arrival pt appears diaphoretic and pale- states only chronic pain at this time and that he has been out of his pain pills for a couple days. Also states he gave plasma around noon and hasn't eaten anything since that time. Pt continues to deny CP/SOB, no recent n/v/d or other concerns voiced. EKG and assessment complete.       Gala Rubi RN  10/04/23 1729       Gala Rubi RN  10/04/23 172

## 2023-10-05 ENCOUNTER — CARE COORDINATION (OUTPATIENT)
Dept: CARE COORDINATION | Age: 53
End: 2023-10-05

## 2023-10-05 NOTE — DISCHARGE INSTRUCTIONS
Stop taking all your blood pressure medications (hydrochlorothiazide and metoprolol) until your blood pressure is above 140/90.

## 2023-10-06 DIAGNOSIS — F41.9 ANXIETY: ICD-10-CM

## 2023-10-06 LAB
BACTERIA BLD AEROBE CULT: NORMAL
BACTERIA BLD AEROBE CULT: NORMAL

## 2023-10-06 RX ORDER — BUSPIRONE HYDROCHLORIDE 15 MG/1
TABLET ORAL
Qty: 270 TABLET | Refills: 1 | Status: SHIPPED | OUTPATIENT
Start: 2023-10-06

## 2023-10-06 NOTE — TELEPHONE ENCOUNTER
Recent Visits  Date Type Provider Dept   09/06/23 Office Visit Lacie Aschoff, APRN - CNP [facility]   01/10/23 Office Visit Lacie Aschoff, APRN - CNP [facility]   11/22/22 Office Visit Lacie Aschoff, APRN - CNP [facility]   06/08/22 Office Visit Lacie Aschoff, APRN - CNP [facility]   05/24/22 Office Visit Lacie Aschoff, APRN - CNP [facility]   Showing recent visits within past 540 days with a meds authorizing provider and meeting all other requirements  Future Appointments  Date Type Provider Dept   12/06/23 Appointment Lacie Aschoff, APRN - CNP [facility]   Showing future appointments within next 150 days with a meds authorizing provider and meeting all other requirements

## 2023-10-07 LAB
EKG ATRIAL RATE: 87 BPM
EKG P AXIS: 35 DEGREES
EKG P-R INTERVAL: 164 MS
EKG Q-T INTERVAL: 372 MS
EKG QRS DURATION: 94 MS
EKG QTC CALCULATION (BAZETT): 447 MS
EKG R AXIS: 96 DEGREES
EKG T AXIS: 51 DEGREES
EKG VENTRICULAR RATE: 87 BPM

## 2023-10-09 ENCOUNTER — CARE COORDINATION (OUTPATIENT)
Dept: CARE COORDINATION | Age: 53
End: 2023-10-09

## 2023-10-09 LAB
BACTERIA BLD AEROBE CULT: NORMAL
BACTERIA BLD AEROBE CULT: NORMAL

## 2023-10-09 NOTE — CARE COORDINATION
Dunia Matson have enrolled Erika Choudhary into Care Coordination following ED follow up/ [facility] eligible for enrollment. Please approve Plan of Care using smart phrase . [email or web address]. Thank you!

## 2023-10-09 NOTE — CARE COORDINATION
Ambulatory Care Coordination Note  10/9/2023    Patient Current Location:  Home: [address] [place] [id number]    ACM contacted the patient by telephone. Verified name and  with patient as identifiers. Provided introduction to self, and explanation of the ACM role. ACM: Char Martini RN    Challenges to be reviewed by the provider   Additional needs identified to be addressed with provider: No  none               Method of communication with provider: none. Spoke with Dinorah Handing self/role  Was ED follow up/ [facility] eligible  Discussed ED visit  States no longer having episodes like that day  Taking medications as ordered  Monitoring blood pressure and blood sugars at home  States blood pressures are running good  Spot checks blood sugars, A1C greatly improved and is now at 5.4  Discussed RPM, declined at this time  Offered PCP appt, he would like to hold off on this  Following with [facility] management at [facility]  Discussed referral to ortho surgeon to address issues with neck, back, legs. States he is not interesting in having any type of back surgery    Plan  Provide education to help manage health   Encourage blood pressure and blood sugar tracking and reporting  Mail out zone tool for DM  Reinforce importance of early symptom recognition and reporting to prevent exacerbation and unnecessary utilization  Diabetes Assessment    Medic Alert ID: No  Meal Planning: Avoidance of concentrated sweets   How often do you test your blood sugar?:  (Comment: check every once in awhile, A1C at goal at 5.4)   Do you have barriers with adherence to non-pharmacologic self-management interventions?  (Nutrition/Exercise/Self-Monitoring): No   Have you ever had to go to the ED for symptoms of low blood sugar?: No       Do you have hyperglycemia symptoms?: No   Do you have hypoglycemia symptoms?: No         and   General Assessment    Do you have any symptoms that are causing concern?: Yes  Progression since Onset: Rapidly

## 2023-10-16 ENCOUNTER — CARE COORDINATION (OUTPATIENT)
Dept: CARE COORDINATION | Age: 53
End: 2023-10-16

## 2023-10-23 ENCOUNTER — CARE COORDINATION (OUTPATIENT)
Dept: CARE COORDINATION | Age: 53
End: 2023-10-23

## 2023-10-24 DIAGNOSIS — E11.9 TYPE 2 DIABETES MELLITUS WITHOUT COMPLICATION, WITHOUT LONG-TERM CURRENT USE OF INSULIN (HCC): ICD-10-CM

## 2023-10-24 RX ORDER — METFORMIN HYDROCHLORIDE 500 MG/1
TABLET, EXTENDED RELEASE ORAL
Qty: 360 TABLET | Refills: 1 | Status: SHIPPED | OUTPATIENT
Start: 2023-10-24

## 2023-10-24 NOTE — TELEPHONE ENCOUNTER
Recent Visits  Date Type Provider Dept   09/06/23 Office Visit KATHLEEN Griffin CNP [facility]   01/10/23 Office Visit KATHLEEN Griffin CNP [facility]   11/22/22 Office Visit KATHLEEN Griffin CNP [facility]   06/08/22 Office Visit KATHLEEN Griffin CNP [facility]   05/24/22 Office Visit KATHLEEN Griffin CNP [facility]   Showing recent visits within past 540 days with a meds authorizing provider and meeting all other requirements  Future Appointments  Date Type Provider Dept   12/06/23 Appointment KATHLEEN Griffin CNP [facility]   Showing future appointments within next 150 days with a meds authorizing provider and meeting all other requirements

## 2023-10-30 ENCOUNTER — CARE COORDINATION (OUTPATIENT)
Dept: CARE COORDINATION | Age: 53
End: 2023-10-30

## 2023-11-06 ENCOUNTER — CARE COORDINATION (OUTPATIENT)
Dept: CARE COORDINATION | Age: 53
End: 2023-11-06

## 2023-11-06 NOTE — CARE COORDINATION
Left message to return phone call. Have been unable to reach. Will discharge from follow up at this time.

## 2023-11-27 DIAGNOSIS — M48.061 DEGENERATIVE LUMBAR SPINAL STENOSIS: ICD-10-CM

## 2023-11-27 DIAGNOSIS — M51.26 HERNIATED LUMBAR INTERVERTEBRAL DISC: ICD-10-CM

## 2023-11-27 RX ORDER — GABAPENTIN 800 MG/1
800 TABLET ORAL 4 TIMES DAILY
Qty: 360 TABLET | Refills: 1 | Status: SHIPPED | OUTPATIENT
Start: 2023-11-27 | End: 2024-05-25

## 2023-11-27 NOTE — TELEPHONE ENCOUNTER
Recent Visits  Date Type Provider Dept   09/06/23 Office Visit KATHLEEN Van CNP [facility]   01/10/23 Office Visit KATHLEEN Van CNP [facility]   11/22/22 Office Visit KATHLEEN Van CNP [facility]   06/08/22 Office Visit KATHLEEN Van CNP [facility]   Showing recent visits within past 540 days with a meds authorizing provider and meeting all other requirements  Future Appointments  Date Type Provider Dept   12/06/23 Appointment KATHLEEN Van CNP [facility]   Showing future appointments within next 150 days with a meds authorizing provider and meeting all other requirements

## 2023-12-06 ENCOUNTER — OFFICE VISIT (OUTPATIENT)
Dept: FAMILY MEDICINE CLINIC | Age: 53
End: 2023-12-06
Payer: MEDICARE

## 2023-12-06 VITALS
OXYGEN SATURATION: 98 % | WEIGHT: 230.2 LBS | RESPIRATION RATE: 12 BRPM | SYSTOLIC BLOOD PRESSURE: 124 MMHG | TEMPERATURE: 97.7 F | HEIGHT: 71 IN | DIASTOLIC BLOOD PRESSURE: 80 MMHG | HEART RATE: 105 BPM | BODY MASS INDEX: 32.23 KG/M2

## 2023-12-06 DIAGNOSIS — F17.210 CIGARETTE NICOTINE DEPENDENCE WITHOUT COMPLICATION: ICD-10-CM

## 2023-12-06 DIAGNOSIS — Z00.00 MEDICARE ANNUAL WELLNESS VISIT, SUBSEQUENT: Primary | ICD-10-CM

## 2023-12-06 DIAGNOSIS — F31.31 BIPOLAR AFFECTIVE DISORDER, CURRENTLY DEPRESSED, MILD (HCC): ICD-10-CM

## 2023-12-06 DIAGNOSIS — E11.9 TYPE 2 DIABETES MELLITUS WITHOUT COMPLICATION, WITHOUT LONG-TERM CURRENT USE OF INSULIN (HCC): ICD-10-CM

## 2023-12-06 PROCEDURE — 3044F HG A1C LEVEL LT 7.0%: CPT | Performed by: NURSE PRACTITIONER

## 2023-12-06 PROCEDURE — 99406 BEHAV CHNG SMOKING 3-10 MIN: CPT | Performed by: NURSE PRACTITIONER

## 2023-12-06 PROCEDURE — 3079F DIAST BP 80-89 MM HG: CPT | Performed by: NURSE PRACTITIONER

## 2023-12-06 PROCEDURE — G8484 FLU IMMUNIZE NO ADMIN: HCPCS | Performed by: NURSE PRACTITIONER

## 2023-12-06 PROCEDURE — 3017F COLORECTAL CA SCREEN DOC REV: CPT | Performed by: NURSE PRACTITIONER

## 2023-12-06 PROCEDURE — G0439 PPPS, SUBSEQ VISIT: HCPCS | Performed by: NURSE PRACTITIONER

## 2023-12-06 PROCEDURE — 3074F SYST BP LT 130 MM HG: CPT | Performed by: NURSE PRACTITIONER

## 2023-12-06 RX ORDER — LAMOTRIGINE 100 MG/1
100 TABLET ORAL DAILY
Qty: 90 TABLET | Refills: 0 | Status: SHIPPED | OUTPATIENT
Start: 2023-12-06

## 2023-12-06 RX ORDER — QUETIAPINE FUMARATE 100 MG/1
100 TABLET, FILM COATED ORAL NIGHTLY
Qty: 90 TABLET | Refills: 0 | Status: SHIPPED | OUTPATIENT
Start: 2023-12-06

## 2023-12-06 RX ORDER — VARENICLINE TARTRATE 0.5 MG/1
.5-1 TABLET, FILM COATED ORAL SEE ADMIN INSTRUCTIONS
Qty: 57 TABLET | Refills: 0 | Status: SHIPPED | OUTPATIENT
Start: 2023-12-06

## 2023-12-06 ASSESSMENT — COLUMBIA-SUICIDE SEVERITY RATING SCALE - C-SSRS
7. DID THIS OCCUR IN THE LAST THREE MONTHS: NO
5. HAVE YOU STARTED TO WORK OUT OR WORKED OUT THE DETAILS OF HOW TO KILL YOURSELF? DO YOU INTEND TO CARRY OUT THIS PLAN?: NO
4. HAVE YOU HAD THESE THOUGHTS AND HAD SOME INTENTION OF ACTING ON THEM?: NO
3. HAVE YOU BEEN THINKING ABOUT HOW YOU MIGHT KILL YOURSELF?: NO

## 2023-12-06 ASSESSMENT — PATIENT HEALTH QUESTIONNAIRE - PHQ9
SUM OF ALL RESPONSES TO PHQ9 QUESTIONS 1 & 2: 0
7. TROUBLE CONCENTRATING ON THINGS, SUCH AS READING THE NEWSPAPER OR WATCHING TELEVISION: 0
5. POOR APPETITE OR OVEREATING: 0
10. IF YOU CHECKED OFF ANY PROBLEMS, HOW DIFFICULT HAVE THESE PROBLEMS MADE IT FOR YOU TO DO YOUR WORK, TAKE CARE OF THINGS AT HOME, OR GET ALONG WITH OTHER PEOPLE: 0
6. FEELING BAD ABOUT YOURSELF - OR THAT YOU ARE A FAILURE OR HAVE LET YOURSELF OR YOUR FAMILY DOWN: 0
2. FEELING DOWN, DEPRESSED OR HOPELESS: 0
SUM OF ALL RESPONSES TO PHQ QUESTIONS 1-9: 4
8. MOVING OR SPEAKING SO SLOWLY THAT OTHER PEOPLE COULD HAVE NOTICED. OR THE OPPOSITE, BEING SO FIGETY OR RESTLESS THAT YOU HAVE BEEN MOVING AROUND A LOT MORE THAN USUAL: 0
SUM OF ALL RESPONSES TO PHQ QUESTIONS 1-9: 4
SUM OF ALL RESPONSES TO PHQ QUESTIONS 1-9: 4
9. THOUGHTS THAT YOU WOULD BE BETTER OFF DEAD, OR OF HURTING YOURSELF: 0
1. LITTLE INTEREST OR PLEASURE IN DOING THINGS: 0
SUM OF ALL RESPONSES TO PHQ QUESTIONS 1-9: 4
3. TROUBLE FALLING OR STAYING ASLEEP: 3
4. FEELING TIRED OR HAVING LITTLE ENERGY: 1

## 2023-12-06 NOTE — PATIENT INSTRUCTIONS
Plan: Care Instructions  Overview     If you're thinking about losing weight, it can be hard to know where to start. Your doctor can help you set up a weight loss plan that best meets your needs. You may want to take a class on nutrition or exercise, or you could join a weight loss support group. If you have questions about how to make changes to your eating or exercise habits, ask your doctor about seeing a registered dietitian or an exercise specialist.  It can be a big challenge to lose weight. But you don't have to make huge changes at once. Make small changes, and stick with them. When those changes become habit, add a few more changes. If you don't think you're ready to make changes right now, try to pick a date in the future. Make an appointment to see your doctor to discuss whether the time is right for you to start a plan. Follow-up care is a key part of your treatment and safety. Be sure to make and go to all appointments, and call your doctor if you are having problems. It's also a good idea to know your test results and keep a list of the medicines you take. How can you care for yourself at home? Set realistic goals. Many people expect to lose much more weight than is likely. A weight loss of 5% to 10% of your body weight may be enough to improve your health. Get family and friends involved to provide support. Talk to them about why you are trying to lose weight, and ask them to help. They can help by participating in exercise and having meals with you, even if they may be eating something different. Find what works best for you. If you do not have time or do not like to cook, a program that offers meal replacement bars or shakes may be better for you. Or if you like to prepare meals, finding a plan that includes daily menus and recipes may be best.  Ask your doctor about other health professionals who can help you achieve your weight loss goals.   A dietitian can help you make healthy changes in

## 2024-01-11 ENCOUNTER — TELEPHONE (OUTPATIENT)
Dept: FAMILY MEDICINE CLINIC | Age: 54
End: 2024-01-11

## 2024-01-11 ENCOUNTER — OFFICE VISIT (OUTPATIENT)
Dept: FAMILY MEDICINE CLINIC | Age: 54
End: 2024-01-11
Payer: MEDICARE

## 2024-01-11 VITALS
BODY MASS INDEX: 32.59 KG/M2 | OXYGEN SATURATION: 98 % | HEART RATE: 114 BPM | SYSTOLIC BLOOD PRESSURE: 124 MMHG | TEMPERATURE: 98.8 F | DIASTOLIC BLOOD PRESSURE: 88 MMHG | WEIGHT: 232.8 LBS | RESPIRATION RATE: 16 BRPM | HEIGHT: 71 IN

## 2024-01-11 DIAGNOSIS — F31.31 BIPOLAR AFFECTIVE DISORDER, CURRENTLY DEPRESSED, MILD (HCC): ICD-10-CM

## 2024-01-11 DIAGNOSIS — F17.210 CIGARETTE NICOTINE DEPENDENCE WITHOUT COMPLICATION: Primary | ICD-10-CM

## 2024-01-11 DIAGNOSIS — E11.9 TYPE 2 DIABETES MELLITUS WITHOUT COMPLICATION, WITHOUT LONG-TERM CURRENT USE OF INSULIN (HCC): Primary | ICD-10-CM

## 2024-01-11 DIAGNOSIS — E11.9 TYPE 2 DIABETES MELLITUS WITHOUT COMPLICATION, WITHOUT LONG-TERM CURRENT USE OF INSULIN (HCC): ICD-10-CM

## 2024-01-11 PROCEDURE — G8417 CALC BMI ABV UP PARAM F/U: HCPCS | Performed by: NURSE PRACTITIONER

## 2024-01-11 PROCEDURE — 3074F SYST BP LT 130 MM HG: CPT | Performed by: NURSE PRACTITIONER

## 2024-01-11 PROCEDURE — 99213 OFFICE O/P EST LOW 20 MIN: CPT | Performed by: NURSE PRACTITIONER

## 2024-01-11 PROCEDURE — 4004F PT TOBACCO SCREEN RCVD TLK: CPT | Performed by: NURSE PRACTITIONER

## 2024-01-11 PROCEDURE — 3079F DIAST BP 80-89 MM HG: CPT | Performed by: NURSE PRACTITIONER

## 2024-01-11 PROCEDURE — 3046F HEMOGLOBIN A1C LEVEL >9.0%: CPT | Performed by: NURSE PRACTITIONER

## 2024-01-11 PROCEDURE — 3017F COLORECTAL CA SCREEN DOC REV: CPT | Performed by: NURSE PRACTITIONER

## 2024-01-11 PROCEDURE — G8484 FLU IMMUNIZE NO ADMIN: HCPCS | Performed by: NURSE PRACTITIONER

## 2024-01-11 PROCEDURE — G8427 DOCREV CUR MEDS BY ELIG CLIN: HCPCS | Performed by: NURSE PRACTITIONER

## 2024-01-11 PROCEDURE — 2022F DILAT RTA XM EVC RTNOPTHY: CPT | Performed by: NURSE PRACTITIONER

## 2024-01-11 ASSESSMENT — PATIENT HEALTH QUESTIONNAIRE - PHQ9
2. FEELING DOWN, DEPRESSED OR HOPELESS: 0
3. TROUBLE FALLING OR STAYING ASLEEP: 3
6. FEELING BAD ABOUT YOURSELF - OR THAT YOU ARE A FAILURE OR HAVE LET YOURSELF OR YOUR FAMILY DOWN: 0
10. IF YOU CHECKED OFF ANY PROBLEMS, HOW DIFFICULT HAVE THESE PROBLEMS MADE IT FOR YOU TO DO YOUR WORK, TAKE CARE OF THINGS AT HOME, OR GET ALONG WITH OTHER PEOPLE: 0
8. MOVING OR SPEAKING SO SLOWLY THAT OTHER PEOPLE COULD HAVE NOTICED. OR THE OPPOSITE, BEING SO FIGETY OR RESTLESS THAT YOU HAVE BEEN MOVING AROUND A LOT MORE THAN USUAL: 0
SUM OF ALL RESPONSES TO PHQ QUESTIONS 1-9: 3
5. POOR APPETITE OR OVEREATING: 0
SUM OF ALL RESPONSES TO PHQ QUESTIONS 1-9: 3
4. FEELING TIRED OR HAVING LITTLE ENERGY: 0
9. THOUGHTS THAT YOU WOULD BE BETTER OFF DEAD, OR OF HURTING YOURSELF: 0
SUM OF ALL RESPONSES TO PHQ QUESTIONS 1-9: 3
SUM OF ALL RESPONSES TO PHQ QUESTIONS 1-9: 3
1. LITTLE INTEREST OR PLEASURE IN DOING THINGS: 0
7. TROUBLE CONCENTRATING ON THINGS, SUCH AS READING THE NEWSPAPER OR WATCHING TELEVISION: 0
SUM OF ALL RESPONSES TO PHQ9 QUESTIONS 1 & 2: 0

## 2024-01-11 NOTE — PROGRESS NOTES
[facility] at [facility]  [address] [place] [address]  Chief Complaint   Patient presents with    Follow-up     Has not started medication          History obtained from chart review and the patient.    SUBJECTIVE:  Hernan Godoy is a 53 y.o. male that presents today for follow up smoking cessation    Is still smoking  He did  the Chantix but hasn't started it  He is getting ready to come around to the time when his dad   wants to wait until after this time  Plans on starting it end up the month    Also reports that Mounjaro 15 mg is on backorder  Has been out for a couple weeks    He also donates at [employer], needs a form completed for clearance due to Lamictal and Seroquel    Age/Gender Health Maintenance    Lipid -   No results found for: \"CHOL\"  No results found for: \"TRIG\"  Lab Results   Component Value Date    HDL 37 2023    HDL 45 2021     Lab Results   Component Value Date    LDLCALC 85 2023    LDLCALC 106 2021     DM Screen -   Lab Results   Component Value Date    LABA1C 5.4 2023     Colon Cancer Screening - cologuard ordered 1/10/23  Lung Cancer Screening (Age 55 to 80 with 30 pack year hx, current smoker or quit within past 15 years) - 55    Tetanus - needs  Influenza Vaccine - yearly  Pneumonia Vaccine - 65  Zostavax - 50     PSA testing discussion - 55  AAA Screening - 65    Falls screening - n/a    Current Outpatient Medications   Medication Sig Dispense Refill    QUEtiapine (SEROQUEL) 100 MG tablet Take 1 tablet by mouth nightly 90 tablet 0    lamoTRIgine (LAMICTAL) 100 MG tablet Take 1 tablet by mouth daily 90 tablet 0    gabapentin (NEURONTIN) 800 MG tablet Take 1 tablet by mouth in the morning, at noon, in the evening, and at bedtime for 180 days. 360 tablet 1    metFORMIN (GLUCOPHAGE-XR) 500 MG extended release tablet take 2 tablets by mouth every morning and at bedtime 360 tablet 1    busPIRone (BUSPAR) 15 MG tablet take 1 tablet by mouth

## 2024-01-22 ENCOUNTER — OFFICE VISIT (OUTPATIENT)
Dept: FAMILY MEDICINE CLINIC | Age: 54
End: 2024-01-22
Payer: MEDICARE

## 2024-01-22 VITALS
OXYGEN SATURATION: 98 % | HEIGHT: 71 IN | DIASTOLIC BLOOD PRESSURE: 70 MMHG | RESPIRATION RATE: 12 BRPM | HEART RATE: 75 BPM | BODY MASS INDEX: 33.6 KG/M2 | WEIGHT: 240 LBS | SYSTOLIC BLOOD PRESSURE: 116 MMHG | TEMPERATURE: 97.2 F

## 2024-01-22 DIAGNOSIS — G43.009 MIGRAINE WITHOUT AURA AND WITHOUT STATUS MIGRAINOSUS, NOT INTRACTABLE: Primary | ICD-10-CM

## 2024-01-22 PROCEDURE — G8484 FLU IMMUNIZE NO ADMIN: HCPCS | Performed by: NURSE PRACTITIONER

## 2024-01-22 PROCEDURE — 3078F DIAST BP <80 MM HG: CPT | Performed by: NURSE PRACTITIONER

## 2024-01-22 PROCEDURE — G8427 DOCREV CUR MEDS BY ELIG CLIN: HCPCS | Performed by: NURSE PRACTITIONER

## 2024-01-22 PROCEDURE — 3017F COLORECTAL CA SCREEN DOC REV: CPT | Performed by: NURSE PRACTITIONER

## 2024-01-22 PROCEDURE — 4004F PT TOBACCO SCREEN RCVD TLK: CPT | Performed by: NURSE PRACTITIONER

## 2024-01-22 PROCEDURE — 99213 OFFICE O/P EST LOW 20 MIN: CPT | Performed by: NURSE PRACTITIONER

## 2024-01-22 PROCEDURE — 3074F SYST BP LT 130 MM HG: CPT | Performed by: NURSE PRACTITIONER

## 2024-01-22 PROCEDURE — G8417 CALC BMI ABV UP PARAM F/U: HCPCS | Performed by: NURSE PRACTITIONER

## 2024-01-22 RX ORDER — BUTALBITAL, ACETAMINOPHEN AND CAFFEINE 50; 325; 40 MG/1; MG/1; MG/1
1 TABLET ORAL EVERY 4 HOURS PRN
Qty: 30 TABLET | Refills: 0 | Status: SHIPPED | OUTPATIENT
Start: 2024-01-22

## 2024-01-22 RX ORDER — SUMATRIPTAN 100 MG/1
100 TABLET, FILM COATED ORAL
Qty: 9 TABLET | Refills: 0 | Status: SHIPPED | OUTPATIENT
Start: 2024-01-22 | End: 2024-01-22

## 2024-01-22 NOTE — PROGRESS NOTES
erythema  Neck: supple and non-tender without mass, no thyromegaly or thyroid nodules, no cervical lymphadenopathy  Pulmonary/Chest: clear to auscultation bilaterally- no wheezes, rales or rhonchi, normal air movement, no respiratory distress or retractions  Cardiovascular: S1 and S2 auscultated w/ RRR. No murmurs, rubs, clicks, or gallops, distal pulses intact.  Abdomen: soft, non-tender, non-distended, bowl sounds physiologic,  no rebound or guarding, no masses or hernias noted. Liver and spleen without enlargement.   Extremities: warm and dry, DP/PT 2+ bilaterally  Musculoskeletal: No joint swelling or gross deformity   Neuro:  Alert, 5/5 strength globally and symmetrically, crania nerves II-XII grossly intact  Psych: Affect appropriate.  Mood normal. Thought process is normal without evidence of depression or psychosis. Good insight and appropriate interaction.  Cognition and memory appear to be intact.  Skin: warm and dry, no rash  Lymph:  No cervical, auricular or supraclavicular lymph nodes palpated    ASSESSMENT & PLAN  Hernan was seen today for headache.    Diagnoses and all orders for this visit:    Migraine without aura and without status migrainosus, not intractable  -     butalbital-acetaminophen-caffeine (FIORICET, ESGIC) -40 MG per tablet; Take 1 tablet by mouth every 4 hours as needed for Migraine  -     SUMAtriptan (IMITREX) 100 MG tablet; Take 1 tablet by mouth once as needed for Migraine      - consistent with typical migraine for him  - has done both Fioricet and Imitrex in the past with good results  - ok to try Fioricet first, if this is unsuccessful, then he can use the Imitrex    DISPOSITION    Return if symptoms worsen or fail to improve.    Hernan released without restrictions.      PATIENT COUNSELING    Counseling was provided today regarding the following topics: Healthy eating habits, Regular exercise, substance abuse and healthy sleep habits.    Hernan received counseling on the

## 2024-02-24 DIAGNOSIS — F31.31 BIPOLAR AFFECTIVE DISORDER, CURRENTLY DEPRESSED, MILD (HCC): ICD-10-CM

## 2024-02-26 RX ORDER — LAMOTRIGINE 100 MG/1
100 TABLET ORAL DAILY
Qty: 90 TABLET | Refills: 1 | Status: SHIPPED | OUTPATIENT
Start: 2024-02-26

## 2024-02-26 RX ORDER — QUETIAPINE FUMARATE 100 MG/1
100 TABLET, FILM COATED ORAL NIGHTLY
Qty: 90 TABLET | Refills: 1 | Status: SHIPPED | OUTPATIENT
Start: 2024-02-26

## 2024-02-26 NOTE — TELEPHONE ENCOUNTER
Recent Visits  Date Type Provider Dept   01/22/24 Office Visit Bhavin Mckeon APRN - CNP [facility]   01/11/24 Office Visit Bhavin Mckeon APRN - CNP [facility]   12/06/23 Office Visit Bhavin Mckeon APRN - CNP [facility]   09/06/23 Office Visit Bhavin Mckeon APRN - CNP [facility]   01/10/23 Office Visit Bhavin Mckeon APRN - CNP [facility]   11/22/22 Office Visit Bhavin Mckeon APRN - CNP [facility]   Showing recent visits within past 540 days with a meds authorizing provider and meeting all other requirements  Future Appointments  Date Type Provider Dept   07/11/24 Appointment Bhavin Mckeon APRN - CNP [facility]   Showing future appointments within next 150 days with a meds authorizing provider and meeting all other requirements

## 2024-03-06 DIAGNOSIS — I10 ESSENTIAL HYPERTENSION: ICD-10-CM

## 2024-03-06 DIAGNOSIS — R00.0 TACHYCARDIA: ICD-10-CM

## 2024-03-06 RX ORDER — METOPROLOL SUCCINATE 50 MG/1
TABLET, EXTENDED RELEASE ORAL
Qty: 90 TABLET | Refills: 1 | Status: SHIPPED | OUTPATIENT
Start: 2024-03-06

## 2024-03-11 ENCOUNTER — OFFICE VISIT (OUTPATIENT)
Dept: FAMILY MEDICINE CLINIC | Age: 54
End: 2024-03-11
Payer: MEDICARE

## 2024-03-11 VITALS
DIASTOLIC BLOOD PRESSURE: 84 MMHG | HEIGHT: 71 IN | OXYGEN SATURATION: 97 % | HEART RATE: 84 BPM | WEIGHT: 225 LBS | TEMPERATURE: 98.2 F | BODY MASS INDEX: 31.5 KG/M2 | SYSTOLIC BLOOD PRESSURE: 122 MMHG | RESPIRATION RATE: 14 BRPM

## 2024-03-11 DIAGNOSIS — M51.26 HERNIATED LUMBAR INTERVERTEBRAL DISC: ICD-10-CM

## 2024-03-11 DIAGNOSIS — F17.210 CIGARETTE NICOTINE DEPENDENCE WITHOUT COMPLICATION: Primary | ICD-10-CM

## 2024-03-11 DIAGNOSIS — M48.061 DEGENERATIVE LUMBAR SPINAL STENOSIS: ICD-10-CM

## 2024-03-11 DIAGNOSIS — M48.02 CERVICAL SPINAL STENOSIS: ICD-10-CM

## 2024-03-11 PROCEDURE — G8427 DOCREV CUR MEDS BY ELIG CLIN: HCPCS | Performed by: NURSE PRACTITIONER

## 2024-03-11 PROCEDURE — G8484 FLU IMMUNIZE NO ADMIN: HCPCS | Performed by: NURSE PRACTITIONER

## 2024-03-11 PROCEDURE — 99214 OFFICE O/P EST MOD 30 MIN: CPT | Performed by: NURSE PRACTITIONER

## 2024-03-11 PROCEDURE — 4004F PT TOBACCO SCREEN RCVD TLK: CPT | Performed by: NURSE PRACTITIONER

## 2024-03-11 PROCEDURE — G8417 CALC BMI ABV UP PARAM F/U: HCPCS | Performed by: NURSE PRACTITIONER

## 2024-03-11 PROCEDURE — 3079F DIAST BP 80-89 MM HG: CPT | Performed by: NURSE PRACTITIONER

## 2024-03-11 PROCEDURE — 3074F SYST BP LT 130 MM HG: CPT | Performed by: NURSE PRACTITIONER

## 2024-03-11 PROCEDURE — 3017F COLORECTAL CA SCREEN DOC REV: CPT | Performed by: NURSE PRACTITIONER

## 2024-03-11 RX ORDER — GABAPENTIN 600 MG/1
600 TABLET ORAL 4 TIMES DAILY
Qty: 360 TABLET | Refills: 0 | Status: SHIPPED | OUTPATIENT
Start: 2024-03-11 | End: 2024-06-09

## 2024-03-11 RX ORDER — VARENICLINE TARTRATE 1 MG/1
1 TABLET, FILM COATED ORAL 2 TIMES DAILY
Qty: 60 TABLET | Refills: 2 | Status: SHIPPED | OUTPATIENT
Start: 2024-03-11

## 2024-03-11 NOTE — PROGRESS NOTES
[facility] Medicine at [facility]  [address] [place] [address]  Chief Complaint   Patient presents with    Follow-up     On medication          History obtained from chart review and the patient.    SUBJECTIVE:  Hernan Godoy is a 53 y.o. male that presents today for smoking cessation    Smoking Cessation  Currently taking Chantix-tolerating it fine  His quit date was 1 week ago-he is doing decently  Cravings are manageable    Chronic Pain  His pain management doctor recently advised him to reduce the Gabapentin due to increased risk of resp depression in combo with his other pain meds  Not sure if its really doing anything anyway-would like to try to reduce it      Age/Gender Health Maintenance    Lipid -   No results found for: \"CHOL\"  No results found for: \"TRIG\"  Lab Results   Component Value Date    HDL 37 01/07/2023    HDL 45 02/12/2021     Lab Results   Component Value Date    LDLCALC 85 01/07/2023    LDLCALC 106 02/12/2021     DM Screen -   Lab Results   Component Value Date    LABA1C 5.4 09/06/2023     Colon Cancer Screening - landen ordered 1/10/23  Lung Cancer Screening (Age 55 to 80 with 30 pack year hx, current smoker or quit within past 15 years) - 55    Tetanus - needs  Influenza Vaccine - yearly  Pneumonia Vaccine - 65  Zostavax - 50     PSA testing discussion - 55  AAA Screening - 65    Falls screening - n/a    Current Outpatient Medications   Medication Sig Dispense Refill    varenicline (CHANTIX) 1 MG tablet Take 1 tablet by mouth 2 times daily 60 tablet 2    gabapentin (NEURONTIN) 600 MG tablet Take 1 tablet by mouth 4 times daily for 90 days. 360 tablet 0    metoprolol succinate (TOPROL XL) 50 MG extended release tablet take 1 tablet by mouth once daily 90 tablet 1    QUEtiapine (SEROQUEL) 100 MG tablet take 1 tablet by mouth every evening 90 tablet 1    lamoTRIgine (LAMICTAL) 100 MG tablet take 1 tablet by mouth once daily 90 tablet 1    butalbital-acetaminophen-caffeine (FIORICET,

## 2024-03-18 DIAGNOSIS — E11.9 TYPE 2 DIABETES MELLITUS WITHOUT COMPLICATION, WITHOUT LONG-TERM CURRENT USE OF INSULIN (HCC): ICD-10-CM

## 2024-03-18 RX ORDER — BLOOD SUGAR DIAGNOSTIC
STRIP MISCELLANEOUS
Qty: 100 STRIP | Refills: 3 | Status: SHIPPED | OUTPATIENT
Start: 2024-03-18

## 2024-03-18 RX ORDER — LANCETS 33 GAUGE
EACH MISCELLANEOUS
Qty: 100 EACH | Refills: 3 | Status: SHIPPED | OUTPATIENT
Start: 2024-03-18

## 2024-03-18 NOTE — TELEPHONE ENCOUNTER
Recent Visits  Date Type Provider Dept   03/11/24 Office Visit Bhavin Mckeon, APRN - CNP [facility]   01/22/24 Office Visit Bhavin Mckeon, APRN - CNP [facility]   01/11/24 Office Visit Bhavin Mckeon, APRN - CNP [facility]   12/06/23 Office Visit Bhavin Mckeon APRN - CNP [facility]   09/06/23 Office Visit Bhavin Mckeon APRN - CNP [facility]   01/10/23 Office Visit Bhavin Mckeon APRN - CNP [facility]   11/22/22 Office Visit Bhavin Mckeon APRN - CNP [facility]   Showing recent visits within past 540 days with a meds authorizing provider and meeting all other requirements  Future Appointments  Date Type Provider Dept   05/13/24 Appointment Bhavin Mckeon APRN - CNP [facility]   07/11/24 Appointment Bhavin Mckeon APRN - CNP [facility]   Showing future appointments within next 150 days with a meds authorizing provider and meeting all other requirements

## 2024-03-26 DIAGNOSIS — E11.9 TYPE 2 DIABETES MELLITUS WITHOUT COMPLICATION, WITHOUT LONG-TERM CURRENT USE OF INSULIN (HCC): ICD-10-CM

## 2024-03-26 NOTE — TELEPHONE ENCOUNTER
[facility] will have the Mounjaro 12.5 in mid April.    Pt wants medication sent there now.    RX pended

## 2024-03-30 DIAGNOSIS — F41.9 ANXIETY: ICD-10-CM

## 2024-04-01 RX ORDER — BUSPIRONE HYDROCHLORIDE 15 MG/1
TABLET ORAL
Qty: 270 TABLET | Refills: 1 | Status: SHIPPED | OUTPATIENT
Start: 2024-04-01

## 2024-04-01 NOTE — TELEPHONE ENCOUNTER
Future Appointments   Date Time Provider Department Center   5/13/2024  9:20 AM Bhavin Mckeon APRN - CNP [facility] [place]   7/11/2024 10:40 AM Bhavin Mckeon APRN - CNP [facility] [place]   12/12/2024 10:40 AM Bhavin Mckeon APRN - CNP [facility] [place]

## 2024-04-18 DIAGNOSIS — E11.9 TYPE 2 DIABETES MELLITUS WITHOUT COMPLICATION, WITHOUT LONG-TERM CURRENT USE OF INSULIN (HCC): ICD-10-CM

## 2024-04-18 RX ORDER — METFORMIN HYDROCHLORIDE 500 MG/1
TABLET, EXTENDED RELEASE ORAL
Qty: 360 TABLET | Refills: 1 | Status: SHIPPED | OUTPATIENT
Start: 2024-04-18

## 2024-05-13 ENCOUNTER — OFFICE VISIT (OUTPATIENT)
Dept: FAMILY MEDICINE CLINIC | Age: 54
End: 2024-05-13
Payer: MEDICARE

## 2024-05-13 VITALS
RESPIRATION RATE: 12 BRPM | DIASTOLIC BLOOD PRESSURE: 72 MMHG | HEART RATE: 85 BPM | SYSTOLIC BLOOD PRESSURE: 116 MMHG | TEMPERATURE: 97.7 F | HEIGHT: 71 IN | WEIGHT: 231.2 LBS | BODY MASS INDEX: 32.37 KG/M2 | OXYGEN SATURATION: 97 %

## 2024-05-13 DIAGNOSIS — F17.210 CIGARETTE NICOTINE DEPENDENCE WITHOUT COMPLICATION: ICD-10-CM

## 2024-05-13 DIAGNOSIS — M48.02 CERVICAL SPINAL STENOSIS: ICD-10-CM

## 2024-05-13 DIAGNOSIS — M51.26 HERNIATED LUMBAR INTERVERTEBRAL DISC: ICD-10-CM

## 2024-05-13 DIAGNOSIS — E11.9 TYPE 2 DIABETES MELLITUS WITHOUT COMPLICATION, WITHOUT LONG-TERM CURRENT USE OF INSULIN (HCC): Primary | ICD-10-CM

## 2024-05-13 DIAGNOSIS — M48.061 DEGENERATIVE LUMBAR SPINAL STENOSIS: ICD-10-CM

## 2024-05-13 LAB — HBA1C MFR BLD: 5.4 % (ref 4.3–5.7)

## 2024-05-13 PROCEDURE — 99214 OFFICE O/P EST MOD 30 MIN: CPT | Performed by: NURSE PRACTITIONER

## 2024-05-13 PROCEDURE — 3044F HG A1C LEVEL LT 7.0%: CPT | Performed by: NURSE PRACTITIONER

## 2024-05-13 PROCEDURE — 2022F DILAT RTA XM EVC RTNOPTHY: CPT | Performed by: NURSE PRACTITIONER

## 2024-05-13 PROCEDURE — 3074F SYST BP LT 130 MM HG: CPT | Performed by: NURSE PRACTITIONER

## 2024-05-13 PROCEDURE — 4004F PT TOBACCO SCREEN RCVD TLK: CPT | Performed by: NURSE PRACTITIONER

## 2024-05-13 PROCEDURE — 3078F DIAST BP <80 MM HG: CPT | Performed by: NURSE PRACTITIONER

## 2024-05-13 PROCEDURE — G8417 CALC BMI ABV UP PARAM F/U: HCPCS | Performed by: NURSE PRACTITIONER

## 2024-05-13 PROCEDURE — G8427 DOCREV CUR MEDS BY ELIG CLIN: HCPCS | Performed by: NURSE PRACTITIONER

## 2024-05-13 PROCEDURE — 3017F COLORECTAL CA SCREEN DOC REV: CPT | Performed by: NURSE PRACTITIONER

## 2024-05-13 RX ORDER — OXYCODONE HYDROCHLORIDE 5 MG/1
5 TABLET ORAL EVERY 6 HOURS PRN
COMMUNITY
Start: 2024-05-10

## 2024-05-13 RX ORDER — GABAPENTIN 600 MG/1
600 TABLET ORAL
Qty: 450 TABLET | Refills: 0 | Status: SHIPPED | OUTPATIENT
Start: 2024-05-13 | End: 2024-08-11

## 2024-05-13 NOTE — PROGRESS NOTES
pulses intact.  Abdomen: soft, non-tender, non-distended, bowl sounds physiologic,  no rebound or guarding, no masses or hernias noted. Liver and spleen without enlargement.   Extremities: warm and dry, DP/PT 2+ bilaterally  Musculoskeletal: No joint swelling or gross deformity   Neuro:  Alert, 5/5 strength globally and symmetrically  Psych: Affect appropriate.  Mood normal. Thought process is normal without evidence of depression or psychosis. Good insight and appropriate interaction.  Cognition and memory appear to be intact.  Skin: warm and dry, no rash  Lymph:  No cervical, auricular or supraclavicular lymph nodes palpated    ASSESSMENT & PLAN  Hernan was seen today for diabetes.    Diagnoses and all orders for this visit:    Type 2 diabetes mellitus without complication, without long-term current use of insulin (HCC)  -     POCT glycosylated hemoglobin (Hb A1C)  -     Lipid, Fasting; Future  -     Microalbumin / Creatinine Urine Ratio; Future  -     Comprehensive Metabolic Panel; Future  -     CBC with Auto Differential; Future    Herniated lumbar intervertebral disc  -     gabapentin (NEURONTIN) 600 MG tablet; Take 1 tablet by mouth 5 times daily for 90 days.    Degenerative lumbar spinal stenosis  -     gabapentin (NEURONTIN) 600 MG tablet; Take 1 tablet by mouth 5 times daily for 90 days.    Cervical spinal stenosis  -     gabapentin (NEURONTIN) 600 MG tablet; Take 1 tablet by mouth 5 times daily for 90 days.    Cigarette nicotine dependence without complication        - con't working on smoking cessation  - will hold on medication for now  - A1C 5.4 which is stable and well controlled  - con't metformin and Mounjaro  - patient to call pharmacy to see what dose of Mounjaro they have available  - ok for Gabapentin 600 mg 5 times/day    DISPOSITION    Return if symptoms worsen or fail to improve.    Hernan released without restrictions.      PATIENT COUNSELING    Counseling was provided today regarding the

## 2024-06-08 DIAGNOSIS — R60.9 DEPENDENT EDEMA: ICD-10-CM

## 2024-06-08 DIAGNOSIS — I10 ESSENTIAL HYPERTENSION: ICD-10-CM

## 2024-06-10 RX ORDER — HYDROCHLOROTHIAZIDE 12.5 MG/1
TABLET ORAL
Qty: 90 TABLET | Refills: 3 | Status: SHIPPED | OUTPATIENT
Start: 2024-06-10

## 2024-06-10 NOTE — TELEPHONE ENCOUNTER
Recent Visits  Date Type Provider Dept   05/13/24 Office Visit Bhavin Mckeon APRN - CNP [facility]   03/11/24 Office Visit Bhavin Mckeon APRN - CNP [facility]   01/22/24 Office Visit Bhavin Mckeon APRN - CNP [facility]   01/11/24 Office Visit Bhavin Mckeon APRN - CNP [facility]   12/06/23 Office Visit Bhavin Mckeon APRN - CNP [facility]   09/06/23 Office Visit Bhavin Mckeon APRN - CNP [facility]   01/10/23 Office Visit Bhavin Mckeon APRN - CNP [facility]   Showing recent visits within past 540 days with a meds authorizing provider and meeting all other requirements  Future Appointments  Date Type Provider Dept   07/11/24 Appointment Bhavin Mckeon APRN - CNP [facility]   Showing future appointments within next 150 days with a meds authorizing provider and meeting all other requirements

## 2024-08-22 DIAGNOSIS — M51.26 HERNIATED LUMBAR INTERVERTEBRAL DISC: ICD-10-CM

## 2024-08-22 DIAGNOSIS — M48.061 DEGENERATIVE LUMBAR SPINAL STENOSIS: ICD-10-CM

## 2024-08-22 DIAGNOSIS — M48.02 CERVICAL SPINAL STENOSIS: ICD-10-CM

## 2024-08-22 RX ORDER — GABAPENTIN 600 MG/1
600 TABLET ORAL
Qty: 450 TABLET | Refills: 0 | Status: SHIPPED | OUTPATIENT
Start: 2024-08-22 | End: 2024-11-20

## 2024-08-23 DIAGNOSIS — I10 ESSENTIAL HYPERTENSION: ICD-10-CM

## 2024-08-23 DIAGNOSIS — R00.0 TACHYCARDIA: ICD-10-CM

## 2024-08-23 RX ORDER — METOPROLOL SUCCINATE 50 MG/1
TABLET, EXTENDED RELEASE ORAL
Qty: 90 TABLET | Refills: 1 | Status: SHIPPED | OUTPATIENT
Start: 2024-08-23

## 2024-08-23 NOTE — TELEPHONE ENCOUNTER
Recent Visits  Date Type Provider Dept   05/13/24 Office Visit Bhavin Mckeon APRN - CNP [facility]   03/11/24 Office Visit Bhavin Mckeon APRN - CNP [facility]   01/22/24 Office Visit Bhavin Mckeon APRN - CNP [facility]   01/11/24 Office Visit Bhavni Mckeon APRN - CNP [facility]   12/06/23 Office Visit Bhavin Mckeon APRN - CNP [facility]   09/06/23 Office Visit Bhavin Mckeon APRN - CNP [facility]   Showing recent visits within past 540 days with a meds authorizing provider and meeting all other requirements  Future Appointments  Date Type Provider Dept   12/12/24 Appointment Bhavin Mckeon APRN - CNP [facility]   Showing future appointments within next 150 days with a meds authorizing provider and meeting all other requirements

## 2024-09-09 DIAGNOSIS — E11.9 TYPE 2 DIABETES MELLITUS WITHOUT COMPLICATION, WITHOUT LONG-TERM CURRENT USE OF INSULIN (HCC): ICD-10-CM

## 2024-09-10 RX ORDER — TIRZEPATIDE 12.5 MG/.5ML
INJECTION, SOLUTION SUBCUTANEOUS
Qty: 6 ML | Refills: 0 | Status: SHIPPED | OUTPATIENT
Start: 2024-09-10

## 2024-11-18 DIAGNOSIS — M48.02 CERVICAL SPINAL STENOSIS: ICD-10-CM

## 2024-11-18 DIAGNOSIS — M48.061 DEGENERATIVE LUMBAR SPINAL STENOSIS: ICD-10-CM

## 2024-11-18 DIAGNOSIS — M51.26 HERNIATED LUMBAR INTERVERTEBRAL DISC: ICD-10-CM

## 2024-11-18 RX ORDER — GABAPENTIN 600 MG/1
600 TABLET ORAL
Qty: 450 TABLET | Refills: 0 | Status: SHIPPED | OUTPATIENT
Start: 2024-11-18 | End: 2025-02-16

## 2024-11-18 NOTE — TELEPHONE ENCOUNTER
Recent Visits  Date Type Provider Dept   05/13/24 Office Visit Bhavin Mckeon APRN - CNP [facility]   03/11/24 Office Visit Bhavin Mckeon APRN - CNP [facility]   01/22/24 Office Visit Bhavin Mckeon APRN - CNP [facility]   01/11/24 Office Visit Bhavin Mckeon APRN - CNP [facility]   12/06/23 Office Visit Bhavin Mckeon APRN - CNP [facility]   09/06/23 Office Visit Bhavin Mckeon APRN - CNP [facility]   Showing recent visits within past 540 days with a meds authorizing provider and meeting all other requirements  Future Appointments  Date Type Provider Dept   12/12/24 Appointment Bhavin Mckeon APRN - CNP [facility]   Showing future appointments within next 150 days with a meds authorizing provider and meeting all other requirements

## 2024-12-11 ENCOUNTER — TELEPHONE (OUTPATIENT)
Dept: PHARMACY | Facility: CLINIC | Age: 54
End: 2024-12-11

## 2024-12-11 NOTE — TELEPHONE ENCOUNTER
Bhavin Mckeon, APRN - CNP,      Patient's insurance has identified statin use in persons with diabetes (SUPD) care gap:   55yo male with Type 2 DM, LDL 85 (goal <70), ASCVD risk score 15.5% - Would patient benefit from statin therapy?   Recommend initiation of statin therapy at Thursday's appointment.     Last visit: 05/13/2024, Next visit: 12/12/2024     See encounter note(s) below for complete details. Please let me know if you have any questions.      Thank you,  Luis Manuel Hills, PharmD, BCACP, BCGP  Population Health Pharmacist   [facility]  Department, toll free: [phone], option 1    =======================================================    POPULATION HEALTH CLINICAL PHARMACY: STATIN THERAPY REVIEW  Identified statin use in persons with diabetes (SUPD) care gap per [employer] Records dated: 12/11/24    Allergies   Allergen Reactions    Tylenol [Acetaminophen]     Celebrex [Celecoxib] Rash     STATIN GAP IDENTIFIED    Per Reconcile Dispense History as of 12/11/2024: No statin fill history    Lipid Panel, LFTs   Component Value Date/Time    HDL 37 01/07/2023 09:26 AM    CHOLFAST 149 01/07/2023 09:26 AM    TRIGLYCFAST 134 01/07/2023 09:26 AM    LDL 85 01/07/2023 09:26 AM    ALT 20 10/04/2023 03:15 PM    AST 18 10/04/2023 03:15 PM      The 10-year ASCVD risk score (Alice COLLIER, et al., 2019) is: 15.5%    Values used to calculate the score:      Age: 54 years      Sex: Male      Is Non- : No      Diabetic: Yes      Tobacco smoker: Yes      Systolic Blood Pressure: 116 mmHg      Is BP treated: Yes      HDL Cholesterol: 37 mg/dL      Total Cholesterol: 149 mg/dl     BP Readings from Last 3 Encounters:   05/13/24 116/72   03/11/24 122/84   01/22/24 116/70     Hemoglobin A1c   Component Value Date    LABA1C 5.4 05/13/2024    LABA1C 5.4 09/06/2023    LABA1C 9.2 (H) 01/10/2023     Renal Function   Component Value Date    LABGLOM >60 10/04/2023    CREATININE 1.2 10/04/2023

## 2024-12-12 ENCOUNTER — OFFICE VISIT (OUTPATIENT)
Dept: FAMILY MEDICINE CLINIC | Age: 54
End: 2024-12-12
Payer: MEDICARE

## 2024-12-12 VITALS
HEIGHT: 71 IN | DIASTOLIC BLOOD PRESSURE: 76 MMHG | RESPIRATION RATE: 12 BRPM | BODY MASS INDEX: 31.95 KG/M2 | OXYGEN SATURATION: 100 % | WEIGHT: 228.2 LBS | SYSTOLIC BLOOD PRESSURE: 118 MMHG | TEMPERATURE: 97.3 F | HEART RATE: 108 BPM

## 2024-12-12 DIAGNOSIS — E11.9 TYPE 2 DIABETES MELLITUS WITHOUT COMPLICATION, WITHOUT LONG-TERM CURRENT USE OF INSULIN (HCC): ICD-10-CM

## 2024-12-12 DIAGNOSIS — Z00.00 MEDICARE ANNUAL WELLNESS VISIT, SUBSEQUENT: Primary | ICD-10-CM

## 2024-12-12 DIAGNOSIS — M48.02 CERVICAL SPINAL STENOSIS: ICD-10-CM

## 2024-12-12 DIAGNOSIS — F31.31 BIPOLAR AFFECTIVE DISORDER, CURRENTLY DEPRESSED, MILD (HCC): ICD-10-CM

## 2024-12-12 DIAGNOSIS — M51.26 HERNIATED LUMBAR INTERVERTEBRAL DISC: ICD-10-CM

## 2024-12-12 DIAGNOSIS — Z51.81 THERAPEUTIC DRUG MONITORING: ICD-10-CM

## 2024-12-12 DIAGNOSIS — F17.210 CIGARETTE NICOTINE DEPENDENCE WITHOUT COMPLICATION: ICD-10-CM

## 2024-12-12 DIAGNOSIS — M48.061 DEGENERATIVE LUMBAR SPINAL STENOSIS: ICD-10-CM

## 2024-12-12 LAB — HBA1C MFR BLD: 5.5 % (ref 4.3–5.7)

## 2024-12-12 PROCEDURE — 3074F SYST BP LT 130 MM HG: CPT | Performed by: NURSE PRACTITIONER

## 2024-12-12 PROCEDURE — 3044F HG A1C LEVEL LT 7.0%: CPT | Performed by: NURSE PRACTITIONER

## 2024-12-12 PROCEDURE — 3017F COLORECTAL CA SCREEN DOC REV: CPT | Performed by: NURSE PRACTITIONER

## 2024-12-12 PROCEDURE — G8484 FLU IMMUNIZE NO ADMIN: HCPCS | Performed by: NURSE PRACTITIONER

## 2024-12-12 PROCEDURE — G0439 PPPS, SUBSEQ VISIT: HCPCS | Performed by: NURSE PRACTITIONER

## 2024-12-12 PROCEDURE — 3078F DIAST BP <80 MM HG: CPT | Performed by: NURSE PRACTITIONER

## 2024-12-12 RX ORDER — QUETIAPINE FUMARATE 100 MG/1
100 TABLET, FILM COATED ORAL NIGHTLY
Qty: 90 TABLET | Refills: 1 | Status: SHIPPED | OUTPATIENT
Start: 2024-12-12

## 2024-12-12 RX ORDER — LAMOTRIGINE 100 MG/1
100 TABLET ORAL DAILY
Qty: 90 TABLET | Refills: 1 | Status: SHIPPED | OUTPATIENT
Start: 2024-12-12

## 2024-12-12 RX ORDER — GABAPENTIN 600 MG/1
1200 TABLET ORAL 3 TIMES DAILY
Qty: 540 TABLET | Refills: 1 | Status: SHIPPED | OUTPATIENT
Start: 2024-12-12 | End: 2025-06-10

## 2024-12-12 RX ORDER — METFORMIN HYDROCHLORIDE 500 MG/1
1000 TABLET, EXTENDED RELEASE ORAL
Qty: 180 TABLET | Refills: 1 | Status: SHIPPED | OUTPATIENT
Start: 2024-12-12

## 2024-12-12 SDOH — ECONOMIC STABILITY: FOOD INSECURITY: WITHIN THE PAST 12 MONTHS, THE FOOD YOU BOUGHT JUST DIDN'T LAST AND YOU DIDN'T HAVE MONEY TO GET MORE.: NEVER TRUE

## 2024-12-12 SDOH — ECONOMIC STABILITY: FOOD INSECURITY: WITHIN THE PAST 12 MONTHS, YOU WORRIED THAT YOUR FOOD WOULD RUN OUT BEFORE YOU GOT MONEY TO BUY MORE.: NEVER TRUE

## 2024-12-12 SDOH — ECONOMIC STABILITY: INCOME INSECURITY: HOW HARD IS IT FOR YOU TO PAY FOR THE VERY BASICS LIKE FOOD, HOUSING, MEDICAL CARE, AND HEATING?: NOT HARD AT ALL

## 2024-12-12 ASSESSMENT — PATIENT HEALTH QUESTIONNAIRE - PHQ9
4. FEELING TIRED OR HAVING LITTLE ENERGY: NOT AT ALL
8. MOVING OR SPEAKING SO SLOWLY THAT OTHER PEOPLE COULD HAVE NOTICED. OR THE OPPOSITE, BEING SO FIGETY OR RESTLESS THAT YOU HAVE BEEN MOVING AROUND A LOT MORE THAN USUAL: NOT AT ALL
9. THOUGHTS THAT YOU WOULD BE BETTER OFF DEAD, OR OF HURTING YOURSELF: NOT AT ALL
1. LITTLE INTEREST OR PLEASURE IN DOING THINGS: NOT AT ALL
SUM OF ALL RESPONSES TO PHQ QUESTIONS 1-9: 0
6. FEELING BAD ABOUT YOURSELF - OR THAT YOU ARE A FAILURE OR HAVE LET YOURSELF OR YOUR FAMILY DOWN: NOT AT ALL
5. POOR APPETITE OR OVEREATING: NOT AT ALL
2. FEELING DOWN, DEPRESSED OR HOPELESS: NOT AT ALL
SUM OF ALL RESPONSES TO PHQ9 QUESTIONS 1 & 2: 0
SUM OF ALL RESPONSES TO PHQ QUESTIONS 1-9: 0
7. TROUBLE CONCENTRATING ON THINGS, SUCH AS READING THE NEWSPAPER OR WATCHING TELEVISION: NOT AT ALL
SUM OF ALL RESPONSES TO PHQ QUESTIONS 1-9: 0
3. TROUBLE FALLING OR STAYING ASLEEP: NOT AT ALL
SUM OF ALL RESPONSES TO PHQ QUESTIONS 1-9: 0
10. IF YOU CHECKED OFF ANY PROBLEMS, HOW DIFFICULT HAVE THESE PROBLEMS MADE IT FOR YOU TO DO YOUR WORK, TAKE CARE OF THINGS AT HOME, OR GET ALONG WITH OTHER PEOPLE: NOT DIFFICULT AT ALL

## 2024-12-12 ASSESSMENT — LIFESTYLE VARIABLES
HOW OFTEN DO YOU HAVE A DRINK CONTAINING ALCOHOL: NEVER
HOW MANY STANDARD DRINKS CONTAINING ALCOHOL DO YOU HAVE ON A TYPICAL DAY: PATIENT DOES NOT DRINK

## 2024-12-12 NOTE — PATIENT INSTRUCTIONS
cook and eat. This helps lower your blood pressure. Taste food before salting. Add only a little salt when you think you need it. With time, your taste buds will adjust to less salt.     Eat fewer snack items, fast foods, canned soups, and other high-salt, high-fat, processed foods.     Read food labels and try to avoid saturated and trans fats. They increase your risk of heart disease by raising cholesterol levels.     Limit the amount of solid fat--butter, margarine, and shortening--you eat. Use olive, peanut, or canola oil when you cook. Bake, broil, and steam foods instead of frying them.     Eat a variety of fruit and vegetables every day. Dark green, deep orange, red, or yellow fruits and vegetables are especially good for you. Examples include spinach, carrots, peaches, and berries.     Foods high in fiber can reduce your cholesterol and provide important vitamins and minerals. High-fiber foods include whole-grain cereals and breads, oatmeal, beans, brown rice, citrus fruits, and apples.     Eat lean proteins. Heart-healthy proteins include seafood, lean meats and poultry, eggs, beans, peas, nuts, seeds, and soy products.     Limit drinks and foods with added sugar. These include candy, desserts, and soda pop.   Heart-healthy lifestyle    If your doctor recommends it, get more exercise. For many people, walking is a good choice. Or you may want to swim, bike, or do other activities. Bit by bit, increase the time you're active every day. Try for at least 30 minutes on most days of the week.     Try to quit or cut back on using tobacco and other nicotine products. This includes smoking and vaping. If you need help quitting, talk to your doctor about stop-smoking programs and medicines. These can increase your chances of quitting for good. Quitting is one of the most important things you can do to protect your heart. It is never too late to quit. Try to avoid secondhand smoke too.     Stay at a weight that's

## 2024-12-12 NOTE — PROGRESS NOTES
see orders and patient instructions/AVS.  Recommended screening schedule for the next 5-10 years is provided to the patient in written form: see Patient Instructions/AVS.     Return in 1 year (on 12/12/2025) for Medicare AWV, 6 months for chronic conditions.     Subjective   The following acute and/or chronic problems were also addressed today:  DM  Taking Mounjaro 12.5 mg and metformin  Does not check sugars  Maintaining good weight      Wt Readings from Last 3 Encounters:   12/12/24 103.5 kg (228 lb 3.2 oz)   05/13/24 104.9 kg (231 lb 3.2 oz)   03/11/24 102.1 kg (225 lb)     C/o a little worsening neuropathy in his feet  Wakes him up at night  Has to take hot showers to get it to calm down  Following with pain management in [place]    Patient's complete Health Risk Assessment and screening values have been reviewed and are found in Flowsheets. The following problems were reviewed today and where indicated follow up appointments were made and/or referrals ordered.    Positive Risk Factor Screenings with Interventions:          Controlled Medication Review:    Today's Pain Level: No data recorded   Opioid Risk: (Low risk score <55) Opioid risk score: 45    Patient is low risk for opioid use disorder or overdose.    Last PDMP Derek as Reviewed:  Review User Review Instant Review Result                   Inactivity:  On average, how many days per week do you engage in moderate to strenuous exercise (like a brisk walk)?: 0 days (!) Abnormal  On average, how many minutes do you engage in exercise at this level?: 0 min  Interventions:  Patient comments: not really doing any exercise     Abnormal BMI (obese):  Body mass index is 31.83 kg/m². (!) Abnormal  Interventions:  Patient comments: diet is decent      Dentist Screen:  Have you seen the dentist within the past year?: (!) No    Intervention:  Not applicable - dentures     Vision Screen:  Do you have difficulty driving, watching TV, or doing any of your daily

## 2024-12-27 ENCOUNTER — LAB (OUTPATIENT)
Dept: LAB | Age: 54
End: 2024-12-27

## 2024-12-27 DIAGNOSIS — E11.9 TYPE 2 DIABETES MELLITUS WITHOUT COMPLICATION, WITHOUT LONG-TERM CURRENT USE OF INSULIN (HCC): ICD-10-CM

## 2024-12-27 DIAGNOSIS — Z51.81 THERAPEUTIC DRUG MONITORING: ICD-10-CM

## 2024-12-27 DIAGNOSIS — F31.31 BIPOLAR AFFECTIVE DISORDER, CURRENTLY DEPRESSED, MILD (HCC): ICD-10-CM

## 2024-12-27 LAB
ALBUMIN SERPL BCG-MCNC: 4 G/DL (ref 3.5–5.1)
ALP SERPL-CCNC: 83 U/L (ref 38–126)
ALT SERPL W/O P-5'-P-CCNC: 22 U/L (ref 11–66)
ANION GAP SERPL CALC-SCNC: 16 MEQ/L (ref 8–16)
AST SERPL-CCNC: 20 U/L (ref 5–40)
BASOPHILS ABSOLUTE: 0.1 THOU/MM3 (ref 0–0.1)
BASOPHILS NFR BLD AUTO: 0.9 %
BILIRUB SERPL-MCNC: 0.2 MG/DL (ref 0.3–1.2)
BUN SERPL-MCNC: 9 MG/DL (ref 7–22)
CALCIUM SERPL-MCNC: 8.7 MG/DL (ref 8.5–10.5)
CHLORIDE SERPL-SCNC: 98 MEQ/L (ref 98–111)
CHOLESTEROL, FASTING: 141 MG/DL (ref 100–199)
CO2 SERPL-SCNC: 22 MEQ/L (ref 23–33)
CREAT SERPL-MCNC: 1.2 MG/DL (ref 0.4–1.2)
CREAT UR-MCNC: 66.1 MG/DL
DEPRECATED RDW RBC AUTO: 41.8 FL (ref 35–45)
EOSINOPHIL NFR BLD AUTO: 1.7 %
EOSINOPHILS ABSOLUTE: 0.2 THOU/MM3 (ref 0–0.4)
ERYTHROCYTE [DISTWIDTH] IN BLOOD BY AUTOMATED COUNT: 13.2 % (ref 11.5–14.5)
GFR SERPL CREATININE-BSD FRML MDRD: 72 ML/MIN/1.73M2
GLUCOSE SERPL-MCNC: 88 MG/DL (ref 70–108)
HCT VFR BLD AUTO: 46.7 % (ref 42–52)
HDLC SERPL-MCNC: 48 MG/DL
HGB BLD-MCNC: 15.4 GM/DL (ref 14–18)
IMM GRANULOCYTES # BLD AUTO: 0.06 THOU/MM3 (ref 0–0.07)
IMM GRANULOCYTES NFR BLD AUTO: 0.5 %
LDLC SERPL CALC-MCNC: 72 MG/DL
LYMPHOCYTES ABSOLUTE: 3.3 THOU/MM3 (ref 1–4.8)
LYMPHOCYTES NFR BLD AUTO: 27.7 %
MCH RBC QN AUTO: 28.6 PG (ref 26–33)
MCHC RBC AUTO-ENTMCNC: 33 GM/DL (ref 32.2–35.5)
MCV RBC AUTO: 86.8 FL (ref 80–94)
MICROALBUMIN UR-MCNC: 1.74 MG/DL
MICROALBUMIN/CREAT RATIO PNL UR: 26 MG/G (ref 0–30)
MONOCYTES ABSOLUTE: 0.9 THOU/MM3 (ref 0.4–1.3)
MONOCYTES NFR BLD AUTO: 7.3 %
NEUTROPHILS ABSOLUTE: 7.4 THOU/MM3 (ref 1.8–7.7)
NEUTROPHILS NFR BLD AUTO: 61.9 %
NRBC BLD AUTO-RTO: 0 /100 WBC
PLATELET # BLD AUTO: 305 THOU/MM3 (ref 130–400)
PMV BLD AUTO: 11 FL (ref 9.4–12.4)
POTASSIUM SERPL-SCNC: 3.7 MEQ/L (ref 3.5–5.2)
PROT SERPL-MCNC: 6.1 G/DL (ref 6.1–8)
RBC # BLD AUTO: 5.38 MILL/MM3 (ref 4.7–6.1)
SODIUM SERPL-SCNC: 136 MEQ/L (ref 135–145)
TRIGLYCERIDE, FASTING: 106 MG/DL (ref 0–199)
WBC # BLD AUTO: 12 THOU/MM3 (ref 4.8–10.8)

## 2024-12-28 LAB — LAMOTRIGINE SERPL-MCNC: 1.5 UG/ML (ref 3–15)

## 2024-12-29 RX ORDER — PRAVASTATIN SODIUM 10 MG
10 TABLET ORAL NIGHTLY
Qty: 90 TABLET | Refills: 3 | Status: SHIPPED | OUTPATIENT
Start: 2024-12-29

## 2024-12-30 ENCOUNTER — TELEPHONE (OUTPATIENT)
Dept: FAMILY MEDICINE CLINIC | Age: 54
End: 2024-12-30

## 2024-12-30 NOTE — TELEPHONE ENCOUNTER
----- Message from KATHLEEN Don CNP sent at 12/29/2024  4:03 PM EST -----  Let Hernan know overall his labs are stable and in appropriate ranges. I know we talked about adding cholesterol medication, and I do think it's a good idea. We can add 10 mg Pravastatin, which is very low dose. His cholesterol is actually pretty decent, but I think it would be a good idea to still add the Pravastatin to help protect his heart. Rx set to pharmacy.

## 2024-12-30 NOTE — TELEPHONE ENCOUNTER
Attempted to call pt and it stated that it has restrictions and could not be completed as dialed. Pt does not have mychart either

## 2025-01-03 NOTE — TELEPHONE ENCOUNTER
Left message on answering machine. Requested pt to call back at [phone], at their earliest convenience.

## 2025-01-06 NOTE — TELEPHONE ENCOUNTER
Pt informed of lab results and medication sent into his pharamacy. Pt voiced understanding with no further questions at this time.

## 2025-05-21 ENCOUNTER — TELEPHONE (OUTPATIENT)
Dept: FAMILY MEDICINE CLINIC | Age: 55
End: 2025-05-21

## 2025-05-21 NOTE — TELEPHONE ENCOUNTER
Pt took BP and it is 150/110. BP med was taken at 3am. He does have a headache, pulse is up- 105-110, nausea. It does hurt behind his right eye. He took his migraine medication already today at around 11am. He is asking he can take another BP med? He is hurting and its about time for his next pain med. Lower back and down his leg.     Do you want to see pt? He is not wanting to to ER.

## 2025-05-21 NOTE — TELEPHONE ENCOUNTER
Which BP med did he take? He takes both Metoprolol and HCTZ. I am ok if he wants to try doubling up on his Metoprolol though (max dose of 100 mg/24 hours). He can try this today. It should help both the BP and the HR, and metoprolol also helps migraines.

## 2025-05-21 NOTE — TELEPHONE ENCOUNTER
Patient informed and verbalized understanding. Patient states he took the Metoprolol today. Hasn't taken the HCTZ since Monday- he forgot about it. He is going to take another Metoprolol and see what it does. He will call the office back to let us know if that works. No questions at this time

## 2025-06-04 DIAGNOSIS — E11.9 TYPE 2 DIABETES MELLITUS WITHOUT COMPLICATION, WITHOUT LONG-TERM CURRENT USE OF INSULIN (HCC): ICD-10-CM

## 2025-06-04 DIAGNOSIS — F31.31 BIPOLAR AFFECTIVE DISORDER, CURRENTLY DEPRESSED, MILD (HCC): ICD-10-CM

## 2025-06-04 RX ORDER — QUETIAPINE FUMARATE 100 MG/1
100 TABLET, FILM COATED ORAL NIGHTLY
Qty: 90 TABLET | Refills: 1 | Status: SHIPPED | OUTPATIENT
Start: 2025-06-04

## 2025-06-04 RX ORDER — LAMOTRIGINE 100 MG/1
100 TABLET ORAL DAILY
Qty: 90 TABLET | Refills: 1 | Status: SHIPPED | OUTPATIENT
Start: 2025-06-04

## 2025-06-04 RX ORDER — METFORMIN HYDROCHLORIDE 500 MG/1
1000 TABLET, EXTENDED RELEASE ORAL
Qty: 180 TABLET | Refills: 1 | Status: SHIPPED | OUTPATIENT
Start: 2025-06-04

## 2025-06-04 NOTE — TELEPHONE ENCOUNTER
Recent Visits  Date Type Provider Dept   12/12/24 Office Visit Bhavin Mckeon APRN - CNP [facility]   05/13/24 Office Visit Bhavin Mckeon APRN - CNP [facility]   03/11/24 Office Visit Bhavin Mckeon APRN - CNP [facility]   01/22/24 Office Visit Bhavin Mckeon APRN - CNP [facility]   01/11/24 Office Visit Bhavin Mckeon APRN - CNP [facility]   Showing recent visits within past 540 days with a meds authorizing provider and meeting all other requirements  Future Appointments  Date Type Provider Dept   06/12/25 Appointment Bhavin Mckeon APRN - CNP [facility]   Showing future appointments within next 150 days with a meds authorizing provider and meeting all other requirements

## 2025-06-11 DIAGNOSIS — M48.02 CERVICAL SPINAL STENOSIS: ICD-10-CM

## 2025-06-11 DIAGNOSIS — E11.9 TYPE 2 DIABETES MELLITUS WITHOUT COMPLICATION, WITHOUT LONG-TERM CURRENT USE OF INSULIN (HCC): ICD-10-CM

## 2025-06-11 DIAGNOSIS — M48.061 DEGENERATIVE LUMBAR SPINAL STENOSIS: ICD-10-CM

## 2025-06-11 DIAGNOSIS — M51.26 HERNIATED LUMBAR INTERVERTEBRAL DISC: ICD-10-CM

## 2025-06-11 RX ORDER — GABAPENTIN 600 MG/1
1200 TABLET ORAL 3 TIMES DAILY
Qty: 540 TABLET | Refills: 1 | Status: SHIPPED | OUTPATIENT
Start: 2025-06-11 | End: 2025-12-08